# Patient Record
Sex: FEMALE | Race: WHITE | Employment: UNEMPLOYED | URBAN - METROPOLITAN AREA
[De-identification: names, ages, dates, MRNs, and addresses within clinical notes are randomized per-mention and may not be internally consistent; named-entity substitution may affect disease eponyms.]

---

## 2022-04-03 ENCOUNTER — HOSPITAL ENCOUNTER (EMERGENCY)
Age: 57
Discharge: HOME | End: 2022-04-03
Attending: EMERGENCY MEDICINE
Payer: COMMERCIAL

## 2022-04-03 ENCOUNTER — APPOINTMENT (OUTPATIENT)
Dept: GENERAL RADIOLOGY | Age: 57
End: 2022-04-03
Payer: COMMERCIAL

## 2022-04-03 VITALS
TEMPERATURE: 98.7 F | HEART RATE: 69 BPM | RESPIRATION RATE: 17 BRPM | OXYGEN SATURATION: 99 % | SYSTOLIC BLOOD PRESSURE: 178 MMHG | DIASTOLIC BLOOD PRESSURE: 108 MMHG

## 2022-04-03 DIAGNOSIS — M79.605 LEFT LEG PAIN: Primary | ICD-10-CM

## 2022-04-03 PROBLEM — I73.9 PERIPHERAL ARTERIAL DISEASE (HCC): Status: ACTIVE | Noted: 2022-04-03

## 2022-04-03 LAB
ABSOLUTE EOS #: 0.03 K/UL (ref 0–0.44)
ABSOLUTE IMMATURE GRANULOCYTE: 0.03 K/UL (ref 0–0.3)
ABSOLUTE LYMPH #: 0.91 K/UL (ref 1.1–3.7)
ABSOLUTE MONO #: 0.36 K/UL (ref 0.1–1.2)
ANION GAP SERPL CALCULATED.3IONS-SCNC: 11 MMOL/L (ref 9–17)
BASOPHILS # BLD: 1 % (ref 0–2)
BASOPHILS ABSOLUTE: 0.03 K/UL (ref 0–0.2)
BUN BLDV-MCNC: 8 MG/DL (ref 6–20)
CALCIUM SERPL-MCNC: 9.7 MG/DL (ref 8.6–10.4)
CHLORIDE BLD-SCNC: 96 MMOL/L (ref 98–107)
CO2: 26 MMOL/L (ref 20–31)
CREAT SERPL-MCNC: 0.82 MG/DL (ref 0.5–0.9)
EOSINOPHILS RELATIVE PERCENT: 1 % (ref 1–4)
GFR AFRICAN AMERICAN: >60 ML/MIN
GFR NON-AFRICAN AMERICAN: >60 ML/MIN
GFR SERPL CREATININE-BSD FRML MDRD: ABNORMAL ML/MIN/{1.73_M2}
GLUCOSE BLD-MCNC: 209 MG/DL (ref 70–99)
HCG QUALITATIVE: NEGATIVE
HCT VFR BLD CALC: 38.8 % (ref 36.3–47.1)
HEMOGLOBIN: 13.5 G/DL (ref 11.9–15.1)
IMMATURE GRANULOCYTES: 1 %
INR BLD: 1.3
LACTIC ACID, WHOLE BLOOD: 1.1 MMOL/L (ref 0.7–2.1)
LYMPHOCYTES # BLD: 14 % (ref 24–43)
MCH RBC QN AUTO: 32.5 PG (ref 25.2–33.5)
MCHC RBC AUTO-ENTMCNC: 34.8 G/DL (ref 28.4–34.8)
MCV RBC AUTO: 93.3 FL (ref 82.6–102.9)
MONOCYTES # BLD: 6 % (ref 3–12)
MYOGLOBIN: 22 NG/ML (ref 25–58)
NRBC AUTOMATED: 0 PER 100 WBC
PARTIAL THROMBOPLASTIN TIME: 46.7 SEC (ref 20.5–30.5)
PDW BLD-RTO: 13 % (ref 11.8–14.4)
PLATELET # BLD: 201 K/UL (ref 138–453)
PMV BLD AUTO: 10.2 FL (ref 8.1–13.5)
POTASSIUM SERPL-SCNC: 3.7 MMOL/L (ref 3.7–5.3)
PROTHROMBIN TIME: 13.8 SEC (ref 9.1–12.3)
RBC # BLD: 4.16 M/UL (ref 3.95–5.11)
SEG NEUTROPHILS: 77 % (ref 36–65)
SEGMENTED NEUTROPHILS ABSOLUTE COUNT: 5.2 K/UL (ref 1.5–8.1)
SODIUM BLD-SCNC: 133 MMOL/L (ref 135–144)
TOTAL CK: 29 U/L (ref 26–192)
WBC # BLD: 6.6 K/UL (ref 3.5–11.3)

## 2022-04-03 PROCEDURE — 85610 PROTHROMBIN TIME: CPT

## 2022-04-03 PROCEDURE — 82550 ASSAY OF CK (CPK): CPT

## 2022-04-03 PROCEDURE — 6360000002 HC RX W HCPCS

## 2022-04-03 PROCEDURE — 83605 ASSAY OF LACTIC ACID: CPT

## 2022-04-03 PROCEDURE — 83874 ASSAY OF MYOGLOBIN: CPT

## 2022-04-03 PROCEDURE — 93970 EXTREMITY STUDY: CPT

## 2022-04-03 PROCEDURE — 84703 CHORIONIC GONADOTROPIN ASSAY: CPT

## 2022-04-03 PROCEDURE — 85730 THROMBOPLASTIN TIME PARTIAL: CPT

## 2022-04-03 PROCEDURE — 93925 LOWER EXTREMITY STUDY: CPT

## 2022-04-03 PROCEDURE — 80048 BASIC METABOLIC PNL TOTAL CA: CPT

## 2022-04-03 PROCEDURE — 96374 THER/PROPH/DIAG INJ IV PUSH: CPT

## 2022-04-03 PROCEDURE — 73630 X-RAY EXAM OF FOOT: CPT

## 2022-04-03 PROCEDURE — 85025 COMPLETE CBC W/AUTO DIFF WBC: CPT

## 2022-04-03 PROCEDURE — 99285 EMERGENCY DEPT VISIT HI MDM: CPT

## 2022-04-03 PROCEDURE — 6370000000 HC RX 637 (ALT 250 FOR IP)

## 2022-04-03 RX ORDER — ALBUTEROL SULFATE 0.63 MG/3ML
1 SOLUTION RESPIRATORY (INHALATION) EVERY 4 HOURS PRN
COMMUNITY

## 2022-04-03 RX ORDER — IBUPROFEN 400 MG/1
400 TABLET ORAL EVERY 6 HOURS PRN
Qty: 120 TABLET | Refills: 0 | Status: ON HOLD | OUTPATIENT
Start: 2022-04-03 | End: 2022-04-17 | Stop reason: HOSPADM

## 2022-04-03 RX ORDER — AMLODIPINE BESYLATE 10 MG/1
10 TABLET ORAL DAILY
Status: ON HOLD | COMMUNITY
End: 2022-04-17 | Stop reason: HOSPADM

## 2022-04-03 RX ORDER — FENOFIBRATE 145 MG/1
145 TABLET, COATED ORAL DAILY
COMMUNITY

## 2022-04-03 RX ORDER — ACETAMINOPHEN 160 MG
2000 TABLET,DISINTEGRATING ORAL DAILY
COMMUNITY

## 2022-04-03 RX ORDER — KETOROLAC TROMETHAMINE 30 MG/ML
30 INJECTION, SOLUTION INTRAMUSCULAR; INTRAVENOUS ONCE
Status: COMPLETED | OUTPATIENT
Start: 2022-04-03 | End: 2022-04-03

## 2022-04-03 RX ORDER — BUPROPION HYDROCHLORIDE 150 MG/1
150 TABLET ORAL EVERY MORNING
COMMUNITY

## 2022-04-03 RX ORDER — OXYCODONE HYDROCHLORIDE 5 MG/1
5 TABLET ORAL ONCE
Status: COMPLETED | OUTPATIENT
Start: 2022-04-03 | End: 2022-04-03

## 2022-04-03 RX ORDER — ACETAMINOPHEN 325 MG/1
325 TABLET ORAL EVERY 6 HOURS PRN
Qty: 28 TABLET | Refills: 0 | Status: SHIPPED | OUTPATIENT
Start: 2022-04-03 | End: 2022-04-10

## 2022-04-03 RX ADMIN — OXYCODONE 5 MG: 5 TABLET ORAL at 20:22

## 2022-04-03 RX ADMIN — KETOROLAC TROMETHAMINE 30 MG: 30 INJECTION, SOLUTION INTRAMUSCULAR at 17:51

## 2022-04-03 ASSESSMENT — PAIN SCALES - GENERAL
PAINLEVEL_OUTOF10: 7
PAINLEVEL_OUTOF10: 7

## 2022-04-03 ASSESSMENT — ENCOUNTER SYMPTOMS
RHINORRHEA: 0
SHORTNESS OF BREATH: 0
BACK PAIN: 0
PHOTOPHOBIA: 0
NAUSEA: 0
WHEEZING: 0
VOMITING: 0
ABDOMINAL PAIN: 0

## 2022-04-03 NOTE — CONSULTS
[address]      Patient's Name/ Date of Birth/ Gender: Otilio Razo / 1965 (49 y.o.) / female     Referring Physician: Gabo Del Rio MD    Consulting Physician: Dr. Maame Foote    History of present Illness: Pt is a 49 y.o. female who is s/p femoral artery angioplasty with stent placement at [facility] towards the beginning of March. A few days ago she went back to [facility] with leg pain and the stent was found to be occluded. Vascular surgery there administered tPA to clear the stent, and she was discharged on Xarelto yesterday. Today she began experiencing left leg pain on the lateral side of her knee, and the pain has migrated up towards mid-thigh. The pain is constant, stabbing in nature but she denies numbness or tingling. She also has mild pain in her foot and her left big toe has some areas of necrosis that has been there for over a month. Pt has taken her Xarelto since discharge from the hospital.     Pt has type 1 diabetes with A1c of 6. She has HTN as well that is controlled with medications. The angioplasty last month was her first vascular procedure, and she had her appendix out as a child and a  as an adult. ED ordered duplex venous and arterial ultrasounds of bilateral legs. Images are being sent over from [facility].     Past Medical History:  has no past medical history on file. Past Surgical History: History reviewed. No pertinent surgical history. Social History:  reports that she has quit smoking. She does not have any smokeless tobacco history on file. She reports current alcohol use. She reports that she does not use drugs. Family History: family history is not on file. Review of Systems:   General: Denies fever, chills, night sweats, weight loss, malaise, fatigue  HEENT: Denies sore throat, sinus problems, allergic rhinosinusitis  Card: Denies chest pain, palpitations, orthopnea/PND.   Pulm: Denies cough, shortness of breath  GI: denies history of constipation, diarrhea, hematochezia or melena  : Denies polyuria, dysuria, hematuria  Endo: History of type 1 diabetes  Heme: Femoral stent clotting a few days ago  Neuro: Denies h/o CVA, TIA  Skin: Denies rashes, ulcers  Musculoskeletal: Left lateral leg pain, chronic left toe pain with black spots    Allergies: Patient has no allergy information on record. Current Meds:No current facility-administered medications for this encounter. No current outpatient medications on file. Vital Signs:  Vitals:    04/03/22 1925   BP:    Pulse: 69   Resp: 17   Temp:    SpO2: 99%       Physical Exam:  Vital signs and Nurse's note reviewed  Gen:  A&Ox3, NAD  HEENT: PERRLA, EOMI, no scleral icterus, oral mucosa moist  Neck: Supple  CVS: Regular rate and rhythm  Resp: normal effort with symmetric rise and fall of chest wall  Abd: soft, non-tender, non-distended, bowel sounds present. Ext: Palpable Left DP, Dopplerable signals on left PT and popliteal, palpable left femoral pulse. No color or temp difference between bilateral feet. Diabetes sensor on left thigh. CNS: Moves all extremities, no gross focal motor deficits  Skin: Left great toe with two small areas of necrosis and surrounding erythema     Labs:   Lab Results   Component Value Date    WBC 6.6 04/03/2022    HGB 13.5 04/03/2022    HCT 38.8 04/03/2022    MCV 93.3 04/03/2022     04/03/2022     Lab Results   Component Value Date     04/03/2022    K 3.7 04/03/2022    CL 96 04/03/2022    CO2 26 04/03/2022    BUN 8 04/03/2022    CREATININE 0.82 04/03/2022    GLUCOSE 209 04/03/2022    CALCIUM 9.7 04/03/2022     Lab Results   Component Value Date    INR 1.3 04/03/2022       Imaging:  XR FOOT LEFT (MIN 3 VIEWS)   Final Result   No fracture or dislocation.          VL DUP LOWER EXTREMITY VENOUS BILATERAL    (Results Pending)   VL DUP LOWER EXTREMITY ARTERIES BILATERAL    (Results Pending) Impression:  Patient Active Problem List   Diagnosis    Peripheral arterial disease ([address]     Recommendation:  · Pt has palpable DP pulse and dopplerable PT signal in the left leg without evidence of DVT and present flow in the femoral artery stent on duplex ultrasound. Based on this and her physical exam, there is no acute vascular surgery warranted at this time. · Recommend she continue Xarelto daily and follow-up with Dr. Ekta Mays in his clinic this coming week for possible angiogram.  · Recommend she follow-up with podiatry for chronic toe necrosis. · Plan discussed with Dr. Ekta Mays who is in agreement.     Jeremiah Alvarez,  PGY-1  4/3/22 8:02 PM

## 2022-04-03 NOTE — ED NOTES
Pt to ED via self with c/o left leg pain. Pt states recent arterial stent occlusion and procedure done at [facility]. Pt was discharged on Xarelto. Pt has noted chronic wound on left great toe. Toe appears red with black area. Pt states hx of DM type 1. Denies injury/trauma.  Pt is a/o, ambulatory, RR even and non labored      Shruthi Camacho RN  04/03/22 5677

## 2022-04-03 NOTE — ED PROVIDER NOTES
[address] [facility]  ED  Emergency Department Encounter  EmergencyMedicine Resident     Pt Devona Sober Judd Jeans  MRN: [id number]  Mjgfpapo 1965  Date of evaluation: 4/3/22  PCP:  No primary care provider on file. This patient was evaluated in the Emergency Department for symptoms described in the history of present illness. The patient was evaluated in the context of the global COVID-19 pandemic, which necessitated consideration that the patient might be at risk for infection with the SARS-CoV-2 virus that causes COVID-19. Institutional protocols and algorithms that pertain to the evaluation of patients at risk for COVID-19 are in a state of rapid change based on information released by regulatory bodies including the CDC and federal and state organizations. These policies and algorithms were followed during the patient's care in the ED. CHIEF COMPLAINT       Chief Complaint   Patient presents with    Leg Pain       HISTORY OF PRESENT ILLNESS  (Location/Symptom, Timing/Onset, Context/Setting, Quality, Duration, Modifying Factors, Severity.)      Desmond Verma is a 49 y.o. female who presents with complaints of left posterior knee pain that extends to the posterior and lateral left thigh. Patient recently had left femoral artery stent placed 3/10/2022 and had increasing pain and had procedure due to blocked stent 3/31/2022 at [facility] in [place] by Dev Fairchild where a catheter was placed and patient had TPA instilled overnight. Patient was discharged from [facility] yesterday on Xarelto. Denies any chest pain, shortness of breath, fever, chills, abdominal pain. No history of previous blood clots in the past, cancers, IV drug use. Patient notes history of smoking for years but mostly stopped a few years ago and last smoked prior to coming to ED today. Patient notes history of type 1 diabetes.   Patient also notes she had left great toe ingrown toenail removal 2 weeks ago with initial improvement in left great toe redness and pain but notes it is now returned over the last week and a half. On review of records from [place], patient was noted to present to [facility] prior to admission on 3/30/2022 with complaints of left great toe pain, erythema and on exam toe was noted to be bluish and CTA showing blocked stent. At that [facility] procedure where catheter was placed from right groin to left femoral artery stent and thrombolysis was passed through catheter with resolution of blockage. PAST MEDICAL / SURGICAL / SOCIAL / FAMILY HISTORY      has no past medical history on file. Hypertension, hyperlipidemia, type 1 diabetes     has no past surgical history on file. Social History     Socioeconomic History    Marital status:      Spouse name: Not on file    Number of children: Not on file    Years of education: Not on file    Highest education level: Not on file   Occupational History    Not on file   Tobacco Use    Smoking status: Former Smoker    Smokeless tobacco: Not on file   Substance and Sexual Activity    Alcohol use: Yes     Comment: social    Drug use: Never    Sexual activity: Not on file   Other Topics Concern    Not on file   Social History Narrative    Not on file     Social Determinants of Health     Financial Resource Strain:     Difficulty of Paying Living Expenses: Not on file   Food Insecurity:     Worried About Running Out of Food in the Last Year: Not on file    Ella of Food in the Last Year: Not on file   Transportation Needs:     Lack of Transportation (Medical): Not on file    Lack of Transportation (Non-Medical):  Not on file   Physical Activity:     Days of Exercise per Week: Not on file    Minutes of Exercise per Session: Not on file   Stress:     Feeling of Stress : Not on file   Social Connections:     Frequency of Communication with Friends and Family: Not on file    Frequency of Social Gatherings with Friends and Family: Not on file    Attends Sikhism Services: Not on file    Active Member of Clubs or Organizations: Not on file    Attends Club or Organization Meetings: Not on file    Marital Status: Not on file   Intimate Partner Violence:     Fear of Current or Ex-Partner: Not on file    Emotionally Abused: Not on file    Physically Abused: Not on file    Sexually Abused: Not on file   Housing Stability:     Unable to Pay for Housing in the Last Year: Not on file    Number of [place] in the Last Year: Not on file    Unstable Housing in the Last Year: Not on file       History reviewed. No pertinent family history. Allergies:  Patient has no allergy information on record. Home Medications:  Prior to Admission medications    Medication Sig Start Date End Date Taking?  Authorizing Provider   albuterol (ACCUNEB) 0.63 MG/3ML nebulizer solution Take 1 ampule by nebulization every 4 hours as needed for Wheezing   Yes Historical Provider, MD   amLODIPine (NORVASC) 10 MG tablet Take 10 mg by mouth daily   Yes Historical Provider, MD   metoprolol tartrate (LOPRESSOR) 25 MG tablet Take 12.5 mg by mouth 2 times daily   Yes Historical Provider, MD   Cholecalciferol (VITAMIN D3) 50 MCG (2000 UT) CAPS Take 2,000 Units by mouth daily   Yes Historical Provider, MD   Insulin Pump Accessories MISC by Does not apply route   Yes Historical Provider, MD   buPROPion (WELLBUTRIN XL) 150 MG extended release tablet Take 150 mg by mouth every morning   Yes Historical Provider, MD   fenofibrate (TRICOR) 145 MG tablet Take 145 mg by mouth daily   Yes Historical Provider, MD   acetaminophen (TYLENOL) 325 MG tablet Take 1 tablet by mouth every 6 hours as needed for Pain 4/3/22 4/10/22 Yes Conner Dangelo MD   ibuprofen (IBU) 400 MG tablet Take 1 tablet by mouth every 6 hours as needed for Pain 4/3/22  Yes Conner Dangelo MD       REVIEW OF SYSTEMS    (2-9 systems for level 4, 10 or more for level 5)      Review of Systems   Constitutional: Negative for chills and fever. HENT: Negative for congestion and rhinorrhea. Eyes: Negative for photophobia and visual disturbance. Respiratory: Negative for shortness of breath and wheezing. Cardiovascular: Negative for chest pain and palpitations. Gastrointestinal: Negative for abdominal pain, nausea and vomiting. Genitourinary: Negative for dysuria and frequency. Musculoskeletal: Negative for back pain and neck pain. Left lower extremity pain   Skin: Positive for wound. Negative for rash. Neurological: Negative for numbness and headaches. PHYSICAL EXAM   (up to 7 for level 4, 8 or more for level 5)      INITIAL VITALS:   BP (!) 178/108   Pulse 69   Temp 98.7 °F (37.1 °C) (Oral)   Resp 17   SpO2 99%     Physical Exam  Vitals and nursing note reviewed. Constitutional:       General: She is not in acute distress. HENT:      Head: Normocephalic and atraumatic. Right Ear: External ear normal.      Left Ear: External ear normal.      Nose: Nose normal.      Mouth/Throat:      Mouth: Mucous membranes are moist.      Pharynx: Oropharynx is clear. Eyes:      Conjunctiva/sclera: Conjunctivae normal.   Cardiovascular:      Rate and Rhythm: Normal rate and regular rhythm. Pulses:           Dorsalis pedis pulses are 1+ on the right side and 1+ on the left side. Pulmonary:      Effort: No respiratory distress. Breath sounds: No wheezing. Abdominal:      Palpations: Abdomen is soft. Tenderness: There is no abdominal tenderness. There is no guarding or rebound. Musculoskeletal:         General: Normal range of motion. Cervical back: Normal range of motion. Comments: No unilateral swelling or calf tenderness   Skin:     General: Skin is warm and dry. Capillary Refill: Capillary refill takes less than 2 seconds.       Comments: Chronic left great toe wound, erythematous, tender to palpation, no drainage   Neurological:      General: No focal deficit present. Mental Status: She is alert and oriented to person, place, and time.          DIFFERENTIAL  DIAGNOSIS     PLAN (LABS / IMAGING / EKG):  Orders Placed This Encounter   Procedures    VL DUP LOWER EXTREMITY VENOUS BILATERAL    VL DUP LOWER EXTREMITY ARTERIES BILATERAL    XR FOOT LEFT (MIN 3 VIEWS)    CBC with Auto Differential    Basic Metabolic Panel w/ Reflex to MG    Protime-INR    APTT    HCG Qualitative, Serum    Lactic Acid    CK    Myoglobin, Serum    Inpatient consult to Vascular Surgery       MEDICATIONS ORDERED:  Orders Placed This Encounter   Medications    ketorolac (TORADOL) injection 30 mg    oxyCODONE (ROXICODONE) immediate release tablet 5 mg    acetaminophen (TYLENOL) 325 MG tablet     Sig: Take 1 tablet by mouth every 6 hours as needed for Pain     Dispense:  28 tablet     Refill:  0    ibuprofen (IBU) 400 MG tablet     Sig: Take 1 tablet by mouth every 6 hours as needed for Pain     Dispense:  120 tablet     Refill:  0       DDX: DVT, postop pain, femoral artery stent malfunction, cellulitis    DIAGNOSTIC RESULTS / EMERGENCY DEPARTMENT COURSE / MDM   LAB RESULTS:  Results for orders placed or performed during the hospital encounter of 04/03/22   CBC with Auto Differential   Result Value Ref Range    WBC 6.6 3.5 - 11.3 k/uL    RBC 4.16 3.95 - 5.11 m/uL    Hemoglobin 13.5 11.9 - 15.1 g/dL    Hematocrit 38.8 36.3 - 47.1 %    MCV 93.3 82.6 - 102.9 fL    MCH 32.5 25.2 - 33.5 pg    MCHC 34.8 28.4 - 34.8 g/dL    RDW 13.0 11.8 - 14.4 %    Platelets 939 289 - 742 k/uL    MPV 10.2 8.1 - 13.5 fL    NRBC Automated 0.0 0.0 per 100 WBC    Seg Neutrophils 77 (H) 36 - 65 %    Lymphocytes 14 (L) 24 - 43 %    Monocytes 6 3 - 12 %    Eosinophils % 1 1 - 4 %    Basophils 1 0 - 2 %    Immature Granulocytes 1 (H) 0 %    Segs Absolute 5.20 1.50 - 8.10 k/uL    Absolute Lymph # 0.91 (L) 1.10 - 3.70 k/uL    Absolute Mono # 0.36 0.10 - 1.20 k/uL    Absolute Eos # 0.03 0.00 - 0.44 k/uL    Basophils Absolute 0.03 0.00 - 0.20 k/uL    Absolute Immature Granulocyte 0.03 0.00 - 0.30 k/uL   Basic Metabolic Panel w/ Reflex to MG   Result Value Ref Range    Glucose 209 (H) 70 - 99 mg/dL    BUN 8 6 - 20 mg/dL    CREATININE 0.82 0.50 - 0.90 mg/dL    Calcium 9.7 8.6 - 10.4 mg/dL    Sodium 133 (L) 135 - 144 mmol/L    Potassium 3.7 3.7 - 5.3 mmol/L    Chloride 96 (L) 98 - 107 mmol/L    CO2 26 20 - 31 mmol/L    Anion Gap 11 9 - 17 mmol/L    GFR Non-African American >60 >60 mL/min    GFR African American >60 >60 mL/min    GFR Comment         Protime-INR   Result Value Ref Range    Protime 13.8 (H) 9.1 - 12.3 sec    INR 1.3    APTT   Result Value Ref Range    PTT 46.7 (H) 20.5 - 30.5 sec   HCG Qualitative, Serum   Result Value Ref Range    hCG Qual NEGATIVE NEGATIVE   Lactic Acid   Result Value Ref Range    Lactic Acid, Whole Blood 1.1 0.7 - 2.1 mmol/L   CK   Result Value Ref Range    Total CK 29 26 - 192 U/L   Myoglobin, Serum   Result Value Ref Range    Myoglobin 22 (L) 25 - 58 ng/mL       IMPRESSION: Left leg pain    RADIOLOGY:  VL DUP LOWER EXTREMITY ARTERIES BILATERAL   Final Result      VL DUP LOWER EXTREMITY VENOUS BILATERAL   Final Result      XR FOOT LEFT (MIN 3 VIEWS)   Final Result   No fracture or dislocation. EMERGENCY DEPARTMENT COURSE:  57-year-old female, history of type 1 diabetes, peripheral vascular disease, hypertension, high cholesterol, presented to ED with complaints of right posterior knee and lateral thigh tenderness over the past day status post being discharged from [facility] in [place]. Patient noted to have left femoral artery stent placed 3/10/2022 for ischemic peripheral vascular disease and presented to [facility] 3/30/2022 for left first toe pain described as blue and noted to have blocked stent requiring catheter placement and TPA.   Patient was just discharged yesterday on Xarelto and stent was noted to be clear after procedure. Patient also noted yesterday an episode of foot swelling extending to mid calf of left leg associated with erythema that is now improved. No chest pain or shortness of breath. Denied any previous history of DVTs but notes history of smoking for years. Did note she had a ingrown toenail of left great toe removed a couple weeks ago with initial resolution of skin changes but notes that her back after the last week and a half. On exam, afebrile, nontachycardic, lungs clear, bilateral DP pulses palpable and patient able to wiggle toes on both feet. Cap refill less than 2 seconds, chronic left great toe wound, erythematous, tender, mildly swollen without drainage, no swelling of left leg greater than right, no calf tenderness. Plan to get bilateral lower extremity ultrasounds of arteries and veins, consult vascular, Toradol for pain, basic labs. Patient without fever, vomiting and white blood cell count within normal limits and skin changes have been present for weeks, so cellulitis less likely. Ultrasounds of lower extremity veins and arteries nonacute, stent patent. Vascular consulted who recommended additional labs including lactate, CK, myoglobin that were nonacute and discharge with follow-up outpatient, ice packs for pain as well as Tylenol and Motrin. Patient agreeable to plan and discharged home with prescription for Tylenol and Motrin with return precautions including any chest pain, shortness of breath, numbness, weakness, tingling, fever, vomiting.     ED Course as of 04/04/22 0141   Sun Apr 03, 2022   1734 Care everywhere, patient had femoral artery stent placement 3/10/2022 with blockage noted 3/30/2022 and now complaining of pain behind left knee extending proximally into posterior left thigh [AR]   1746 PTT(!): 46.7 [AR]   1820 Prothrombin Time(!): 13.8 [AR]   1820 GLUCOSE, FASTING,GF(!): 209 [AR]   1820 Sodium(!): 133 [AR]   1820 Chloride(!): 96 [AR]   1820 WBC: 6.6 [AR]   1837 Per notes from [facility] patient had stent placed on 3/10/2022 in left femoral artery for occlusive peripheral artery disease and then presented to [facility] ED on 3/30/2022 with complaints of pain in left first digit of left foot and had procedure on 3/31/2022 by vascular where CTA was performed and underwent placement of EKOS catheter and had overnight thrombolysis. Patient then had repeat angiogram that demonstrated patency of previously placed stent. [AR]   1839 Vascular bedside [AR]   1949 Verbal read back from ultrasound tech showing no evidence of DVT in either extremity or issue with flow through the stent. Vascular resident notified. Awaiting recs [AR]   2006 Updated patient on plan to get additional lab work ordered by vascular. Patient notes having pain after Toradol so plan to give 5 of Michaela. Patient provided sandwich as well. [AR]   2026 Lactic Acid, Whole Blood: 1.1  Updated on results. Vascular noted patient okay to be discharged with Tylenol and Motrin and recommended outpatient follow-up. Answered all patient pertinent questions and provided return precautions including any chest pain, shortness of breath, return of swelling and either extremity, nausea, vomiting, fevers, increasing pain. Answered all patient pertinent questions and agreeable to plan of care. [AR]   2119 Total CK: 29 [AR]   2119 Myoglobin(!): 22 [AR]      ED Course User Index  [AR] Greg Monteiro MD       PROCEDURES:  None    CONSULTS:  IP CONSULT TO VASCULAR SURGERY    CRITICAL CARE:  None    FINAL IMPRESSION      1.  Left leg pain          DISPOSITION / PLAN     DISPOSITION Decision To Discharge 04/03/2022 09:00:10 PM      PATIENT REFERRED TO:  Arlyn Hernandez MD  [address] [address] [facility] [address] [place] [address]  [phone]    Call in 1 week        DISCHARGE MEDICATIONS:  Discharge Medication List as of 4/3/2022  9:07 PM      START taking these medications    Details   acetaminophen (TYLENOL) 325 MG tablet Take 1 tablet by mouth every 6 hours as needed for Pain, Disp-28 tablet, R-0Print      ibuprofen (IBU) 400 MG tablet Take 1 tablet by mouth every 6 hours as needed for Pain, Disp-120 tablet, R-0Print             Chyna Austin MD  Emergency Medicine Resident    (Please note that portions of thisnote were completed with a voice recognition program.  Efforts were made to edit the dictations but occasionally words are mis-transcribed.)      Em Bowie MD  Resident  04/04/22 5035

## 2022-04-03 NOTE — ED PROVIDER NOTES
[address] [facility]     Emergency Department     Faculty Attestation    I performed a history and physical examination of the patient and discussed management with the resident. I reviewed the residents note and agree with the documented findings and plan of care. Any areas of disagreement are noted on the chart. I was personally present for the key portions of any procedures. I have documented in the chart those procedures where I was not present during the key portions. I have reviewed the emergency nurses triage note. I agree with the chief complaint, past medical history, past surgical history, allergies, medications, social and family history as documented unless otherwise noted below. For Physician Assistant/ Nurse Practitioner cases/documentation I have personally evaluated this patient and have completed at least one if not all key elements of the E/M (history, physical exam, and MDM). Additional findings are as noted. This patient was evaluated in the Emergency Department for symptoms described in the history of present illness. He/she was evaluated in the context of the global COVID-19 pandemic, which necessitated consideration that the patient might be at risk for infection with the SARS-CoV-2 virus that causes COVID-19. Institutional protocols and algorithms that pertain to the evaluation of patients at risk for COVID-19 are in a state of rapid change based on information released by regulatory bodies including the CDC and federal and state organizations. These policies and algorithms were followed during the patient's care in the ED. Patient here with left leg pain. Recent procedures done at [facility] and [address], had a left femoral artery stent that occluded additional procedure several days ago was just discharged yesterday. Also states she had a blood clot in her leg. She is on Xarelto. Denies any chest pain shortness of breath no fevers.   On exam equal pulses distally poor but equal both feet pink warm. Chronic appearing wound on the left great toe. Will order vascular ultrasounds, labs, consult vascular, reevaluate      Results from [facility] through care everywhere. Venous US 3/30/22  FINDINGS:   Common femoral vein: Patent. Deep femoral vein: Patent   Femoral vein: Patent. Popliteal vein: Patent. Tibioperoneal veins: Patent. Greater saphenous vein: Patent. IMPRESSION: No left lower extremity deep venous thrombosis. Arterial US 3/30/22  IMPRESSION:   1. Distal left femoral artery stent is occluded. Prominent collateral vessels   supplying diminished flow to the distal lower extremity. Critical Care     none    Letty Sadler MD, Neymar Goodrich  Attending Emergency  Physician             Letty Sadler MD  04/03/22 1808    7:46 PM EDT  Verbal face-to-face report from vascular tech, no DVT, good arterial flow left lower extremity including through femoral artery stent.        Letty Sadler MD  04/03/22 1946

## 2022-04-04 NOTE — ED NOTES
The following labs labeled with pt sticker and tubed to lab:     [] Blue     [] Lavender   [] on ice  [] Green/yellow  [x] Green/black [] on ice  [] Yellow  [] Red  [] Pink      [] COVID-19 swab    [] Rapid  [] PCR  [] Flu swab  [] Peds Viral Panel     [] Urine Sample  [] Pelvic Cultures  [] Blood Cultures            Clarence Barlow RN  04/03/22 2028

## 2022-04-11 ENCOUNTER — APPOINTMENT (OUTPATIENT)
Dept: CT IMAGING | Age: 57
DRG: 181 | End: 2022-04-11
Attending: INTERNAL MEDICINE
Payer: COMMERCIAL

## 2022-04-11 ENCOUNTER — APPOINTMENT (OUTPATIENT)
Dept: GENERAL RADIOLOGY | Age: 57
DRG: 181 | End: 2022-04-11
Attending: INTERNAL MEDICINE
Payer: COMMERCIAL

## 2022-04-11 ENCOUNTER — HOSPITAL ENCOUNTER (INPATIENT)
Age: 57
LOS: 7 days | Discharge: HOME | DRG: 181 | End: 2022-04-18
Attending: INTERNAL MEDICINE | Admitting: INTERNAL MEDICINE
Payer: COMMERCIAL

## 2022-04-11 DIAGNOSIS — I73.9 PERIPHERAL ARTERIAL DISEASE (HCC): Primary | ICD-10-CM

## 2022-04-11 DIAGNOSIS — I96 GANGRENE (HCC): ICD-10-CM

## 2022-04-11 PROBLEM — I10 PRIMARY HYPERTENSION: Status: ACTIVE | Noted: 2022-04-11

## 2022-04-11 PROBLEM — E10.9 TYPE 1 DIABETES MELLITUS (HCC): Status: ACTIVE | Noted: 2022-04-11

## 2022-04-11 LAB
ABSOLUTE EOS #: 0.11 K/UL (ref 0–0.44)
ABSOLUTE IMMATURE GRANULOCYTE: <0.03 K/UL (ref 0–0.3)
ABSOLUTE LYMPH #: 2.4 K/UL (ref 1.1–3.7)
ABSOLUTE MONO #: 0.3 K/UL (ref 0.1–1.2)
ANION GAP SERPL CALCULATED.3IONS-SCNC: 13 MMOL/L (ref 9–17)
BASOPHILS # BLD: 1 % (ref 0–2)
BASOPHILS ABSOLUTE: 0.06 K/UL (ref 0–0.2)
BUN BLDV-MCNC: 6 MG/DL (ref 6–20)
CALCIUM SERPL-MCNC: 9.4 MG/DL (ref 8.6–10.4)
CHLORIDE BLD-SCNC: 101 MMOL/L (ref 98–107)
CO2: 24 MMOL/L (ref 20–31)
CREAT SERPL-MCNC: 0.77 MG/DL (ref 0.5–0.9)
EOSINOPHILS RELATIVE PERCENT: 2 % (ref 1–4)
GFR AFRICAN AMERICAN: >60 ML/MIN
GFR NON-AFRICAN AMERICAN: >60 ML/MIN
GFR SERPL CREATININE-BSD FRML MDRD: ABNORMAL ML/MIN/{1.73_M2}
GLUCOSE BLD-MCNC: 133 MG/DL (ref 70–99)
HCT VFR BLD CALC: 38.9 % (ref 36.3–47.1)
HEMOGLOBIN: 13.4 G/DL (ref 11.9–15.1)
IMMATURE GRANULOCYTES: 0 %
INR BLD: 1.2
LYMPHOCYTES # BLD: 36 % (ref 24–43)
MCH RBC QN AUTO: 32.6 PG (ref 25.2–33.5)
MCHC RBC AUTO-ENTMCNC: 34.4 G/DL (ref 28.4–34.8)
MCV RBC AUTO: 94.6 FL (ref 82.6–102.9)
MONOCYTES # BLD: 5 % (ref 3–12)
NRBC AUTOMATED: 0 PER 100 WBC
PARTIAL THROMBOPLASTIN TIME: 99.7 SEC (ref 20.5–30.5)
PDW BLD-RTO: 13.2 % (ref 11.8–14.4)
PLATELET # BLD: 399 K/UL (ref 138–453)
PMV BLD AUTO: 9.7 FL (ref 8.1–13.5)
POTASSIUM SERPL-SCNC: 3.9 MMOL/L (ref 3.7–5.3)
PROTHROMBIN TIME: 12.9 SEC (ref 9.1–12.3)
RBC # BLD: 4.11 M/UL (ref 3.95–5.11)
SEG NEUTROPHILS: 57 % (ref 36–65)
SEGMENTED NEUTROPHILS ABSOLUTE COUNT: 3.83 K/UL (ref 1.5–8.1)
SODIUM BLD-SCNC: 138 MMOL/L (ref 135–144)
WBC # BLD: 6.7 K/UL (ref 3.5–11.3)

## 2022-04-11 PROCEDURE — 36415 COLL VENOUS BLD VENIPUNCTURE: CPT

## 2022-04-11 PROCEDURE — 85730 THROMBOPLASTIN TIME PARTIAL: CPT

## 2022-04-11 PROCEDURE — 6360000002 HC RX W HCPCS: Performed by: INTERNAL MEDICINE

## 2022-04-11 PROCEDURE — 1200000000 HC SEMI PRIVATE

## 2022-04-11 PROCEDURE — 75635 CT ANGIO ABDOMINAL ARTERIES: CPT

## 2022-04-11 PROCEDURE — 85610 PROTHROMBIN TIME: CPT

## 2022-04-11 PROCEDURE — 6360000004 HC RX CONTRAST MEDICATION: Performed by: STUDENT IN AN ORGANIZED HEALTH CARE EDUCATION/TRAINING PROGRAM

## 2022-04-11 PROCEDURE — 2580000003 HC RX 258: Performed by: STUDENT IN AN ORGANIZED HEALTH CARE EDUCATION/TRAINING PROGRAM

## 2022-04-11 PROCEDURE — 6360000002 HC RX W HCPCS: Performed by: STUDENT IN AN ORGANIZED HEALTH CARE EDUCATION/TRAINING PROGRAM

## 2022-04-11 PROCEDURE — 85025 COMPLETE CBC W/AUTO DIFF WBC: CPT

## 2022-04-11 PROCEDURE — 99222 1ST HOSP IP/OBS MODERATE 55: CPT | Performed by: NURSE PRACTITIONER

## 2022-04-11 PROCEDURE — 2580000003 HC RX 258: Performed by: INTERNAL MEDICINE

## 2022-04-11 PROCEDURE — 80048 BASIC METABOLIC PNL TOTAL CA: CPT

## 2022-04-11 PROCEDURE — 6370000000 HC RX 637 (ALT 250 FOR IP): Performed by: INTERNAL MEDICINE

## 2022-04-11 PROCEDURE — 71045 X-RAY EXAM CHEST 1 VIEW: CPT

## 2022-04-11 RX ORDER — ALBUTEROL SULFATE 2.5 MG/3ML
2.5 SOLUTION RESPIRATORY (INHALATION) 4 TIMES DAILY PRN
Status: DISCONTINUED | OUTPATIENT
Start: 2022-04-11 | End: 2022-04-18 | Stop reason: HOSPADM

## 2022-04-11 RX ORDER — AMLODIPINE BESYLATE 10 MG/1
10 TABLET ORAL DAILY
Status: DISCONTINUED | OUTPATIENT
Start: 2022-04-11 | End: 2022-04-12

## 2022-04-11 RX ORDER — HEPARIN SODIUM 1000 [USP'U]/ML
80 INJECTION, SOLUTION INTRAVENOUS; SUBCUTANEOUS PRN
Status: DISCONTINUED | OUTPATIENT
Start: 2022-04-11 | End: 2022-04-14

## 2022-04-11 RX ORDER — SODIUM CHLORIDE 0.9 % (FLUSH) 0.9 %
5-40 SYRINGE (ML) INJECTION PRN
Status: DISCONTINUED | OUTPATIENT
Start: 2022-04-11 | End: 2022-04-18 | Stop reason: HOSPADM

## 2022-04-11 RX ORDER — HEPARIN SODIUM 1000 [USP'U]/ML
80 INJECTION, SOLUTION INTRAVENOUS; SUBCUTANEOUS ONCE
Status: COMPLETED | OUTPATIENT
Start: 2022-04-11 | End: 2022-04-11

## 2022-04-11 RX ORDER — BUPROPION HYDROCHLORIDE 150 MG/1
150 TABLET ORAL EVERY MORNING
Status: DISCONTINUED | OUTPATIENT
Start: 2022-04-12 | End: 2022-04-18 | Stop reason: HOSPADM

## 2022-04-11 RX ORDER — ACETAMINOPHEN 325 MG/1
650 TABLET ORAL EVERY 6 HOURS PRN
Status: DISCONTINUED | OUTPATIENT
Start: 2022-04-11 | End: 2022-04-18 | Stop reason: HOSPADM

## 2022-04-11 RX ORDER — SODIUM CHLORIDE 9 MG/ML
INJECTION, SOLUTION INTRAVENOUS PRN
Status: DISCONTINUED | OUTPATIENT
Start: 2022-04-11 | End: 2022-04-18 | Stop reason: HOSPADM

## 2022-04-11 RX ORDER — ONDANSETRON 4 MG/1
4 TABLET, ORALLY DISINTEGRATING ORAL EVERY 8 HOURS PRN
Status: DISCONTINUED | OUTPATIENT
Start: 2022-04-11 | End: 2022-04-18 | Stop reason: HOSPADM

## 2022-04-11 RX ORDER — MORPHINE SULFATE 4 MG/ML
4 INJECTION, SOLUTION INTRAMUSCULAR; INTRAVENOUS
Status: DISCONTINUED | OUTPATIENT
Start: 2022-04-11 | End: 2022-04-12

## 2022-04-11 RX ORDER — POLYETHYLENE GLYCOL 3350 17 G/17G
17 POWDER, FOR SOLUTION ORAL DAILY PRN
Status: DISCONTINUED | OUTPATIENT
Start: 2022-04-11 | End: 2022-04-18 | Stop reason: HOSPADM

## 2022-04-11 RX ORDER — HEPARIN SODIUM 1000 [USP'U]/ML
40 INJECTION, SOLUTION INTRAVENOUS; SUBCUTANEOUS PRN
Status: DISCONTINUED | OUTPATIENT
Start: 2022-04-11 | End: 2022-04-14

## 2022-04-11 RX ORDER — SODIUM CHLORIDE 0.9 % (FLUSH) 0.9 %
5-40 SYRINGE (ML) INJECTION EVERY 12 HOURS SCHEDULED
Status: DISCONTINUED | OUTPATIENT
Start: 2022-04-11 | End: 2022-04-18 | Stop reason: HOSPADM

## 2022-04-11 RX ORDER — ONDANSETRON 2 MG/ML
4 INJECTION INTRAMUSCULAR; INTRAVENOUS EVERY 6 HOURS PRN
Status: DISCONTINUED | OUTPATIENT
Start: 2022-04-11 | End: 2022-04-18 | Stop reason: HOSPADM

## 2022-04-11 RX ORDER — ACETAMINOPHEN 650 MG/1
650 SUPPOSITORY RECTAL EVERY 6 HOURS PRN
Status: DISCONTINUED | OUTPATIENT
Start: 2022-04-11 | End: 2022-04-18 | Stop reason: HOSPADM

## 2022-04-11 RX ADMIN — ONDANSETRON 4 MG: 2 INJECTION INTRAMUSCULAR; INTRAVENOUS at 23:43

## 2022-04-11 RX ADMIN — HEPARIN SODIUM 3920 UNITS: 1000 INJECTION INTRAVENOUS; SUBCUTANEOUS at 17:25

## 2022-04-11 RX ADMIN — MORPHINE SULFATE 4 MG: 4 INJECTION INTRAVENOUS at 23:43

## 2022-04-11 RX ADMIN — HEPARIN SODIUM 18 UNITS/KG/HR: 5000 INJECTION, SOLUTION INTRAVENOUS; SUBCUTANEOUS at 17:36

## 2022-04-11 RX ADMIN — METOPROLOL TARTRATE 12.5 MG: 25 TABLET ORAL at 20:53

## 2022-04-11 RX ADMIN — MORPHINE SULFATE 4 MG: 4 INJECTION INTRAVENOUS at 17:25

## 2022-04-11 RX ADMIN — IOPAMIDOL 125 ML: 755 INJECTION, SOLUTION INTRAVENOUS at 22:48

## 2022-04-11 RX ADMIN — MORPHINE SULFATE 4 MG: 4 INJECTION INTRAVENOUS at 20:37

## 2022-04-11 RX ADMIN — ONDANSETRON 4 MG: 2 INJECTION INTRAMUSCULAR; INTRAVENOUS at 17:24

## 2022-04-11 RX ADMIN — SODIUM CHLORIDE, PRESERVATIVE FREE 10 ML: 5 INJECTION INTRAVENOUS at 20:40

## 2022-04-11 ASSESSMENT — ENCOUNTER SYMPTOMS
EYE REDNESS: 0
SINUS PAIN: 0
SINUS PRESSURE: 0
COUGH: 0
CONSTIPATION: 0
EYE PAIN: 0
ABDOMINAL PAIN: 0
DIARRHEA: 0
BACK PAIN: 0
CHOKING: 0

## 2022-04-11 ASSESSMENT — PAIN SCALES - GENERAL
PAINLEVEL_OUTOF10: 10
PAINLEVEL_OUTOF10: 9
PAINLEVEL_OUTOF10: 10
PAINLEVEL_OUTOF10: 4

## 2022-04-11 NOTE — H&P
re-admitted. She continued to have pain after discharge and had her sister drive her to [facility] ED where she had a vascular consult and established care with vascular MD. She went to office appointment today where she states they were unable to find pulses to her left foot. She was transferred for vascular intervention. She states that she has been unable to walk on the foot and pain has been unbearable requiring narcotic pain relief. In addition she has DM1 and HTN. She states she is compliant with home medications and has an insulin pump for DM management. Past Medical History:     Past Medical History:   Diagnosis Date    HTN (hypertension)     Type 1 diabetes mellitus ([facility] [address]         Past Surgical History:     Past Surgical History:   Procedure Laterality Date    APPENDECTOMY       SECTION      COCCYX REMOVAL          Medications Prior to Admission:     Prior to Admission medications    Medication Sig Start Date End Date Taking?  Authorizing Provider   albuterol (ACCUNEB) 0.63 MG/3ML nebulizer solution Take 1 ampule by nebulization every 4 hours as needed for Wheezing    Historical Provider, MD   amLODIPine (NORVASC) 10 MG tablet Take 10 mg by mouth daily    Historical Provider, MD   metoprolol tartrate (LOPRESSOR) 25 MG tablet Take 12.5 mg by mouth 2 times daily    Historical Provider, MD   Cholecalciferol (VITAMIN D3) 50 MCG (2000) CAPS Take 2,000 Units by mouth daily    Historical Provider, MD   Insulin Pump Accessories MISC by Does not apply route    Historical Provider, MD   buPROPion (WELLBUTRIN XL) 150 MG extended release tablet Take 150 mg by mouth every morning    Historical Provider, MD   fenofibrate (TRICOR) 145 MG tablet Take 145 mg by mouth daily    Historical Provider, MD   acetaminophen (TYLENOL) 325 MG tablet Take 1 tablet by mouth every 6 hours as needed for Pain 4/3/22 4/10/22  Tyrel Jauregui MD   ibuprofen (IBU) 400 MG tablet Take 1 tablet by mouth every 6 hours as needed for Pain 4/3/22   Alecia Simpson MD        Allergies:     Patient has no allergy information on record. Social History:     Tobacco:    reports that she quit smoking about 3 months ago. She quit after 25.00 years of use. She has never used smokeless tobacco.  Alcohol:      reports current alcohol use. Drug Use:  reports no history of drug use. Family History:     Family History   Problem Relation Age of Onset    Other Mother     Other Father     Diabetes Sister        Review of Systems:     Positive and Negative as described in HPI. Review of Systems   Constitutional: Negative for chills and fatigue. HENT: Negative for sinus pressure and sinus pain. Eyes: Negative for pain and redness. Respiratory: Negative for cough and choking. Cardiovascular: Negative for chest pain, palpitations and leg swelling. Gastrointestinal: Negative for abdominal pain, constipation and diarrhea. Endocrine: Negative for polydipsia, polyphagia and polyuria. Genitourinary: Negative for dysuria, hematuria and urgency. Musculoskeletal: Positive for gait problem. Negative for arthralgias and back pain. Skin: Positive for wound (Left great toe). Allergic/Immunologic: Negative for environmental allergies, food allergies and immunocompromised state. Neurological: Negative for seizures, weakness and headaches. Hematological: Negative for adenopathy. Does not bruise/bleed easily. Psychiatric/Behavioral: Negative for agitation. The patient is not nervous/anxious. Physical Exam:   BP (!) 135/93   Pulse 76   Temp 98.1 °F (36.7 °C) (Oral)   Resp 16   Wt 108 lb 0.4 oz (49 kg)   SpO2 99%   Temp (24hrs), Av.1 °F (36.7 °C), Min:98.1 °F (36.7 °C), Max:98.1 °F (36.7 °C)    No results for input(s): POCGLU in the last 72 hours. No intake or output data in the 24 hours ending 22 1611    Physical Exam  Constitutional:       General: She is not in acute distress. Appearance: Normal appearance.  She is not ill-appearing. HENT:      Nose: Nose normal. No congestion. Mouth/Throat:      Mouth: Mucous membranes are moist.      Pharynx: Oropharynx is clear. Eyes:      Pupils: Pupils are equal, round, and reactive to light. Cardiovascular:      Rate and Rhythm: Normal rate and regular rhythm. Pulses:           Dorsalis pedis pulses are detected w/ Doppler on the left side. Posterior tibial pulses are detected w/ Doppler on the left side. Heart sounds: Normal heart sounds. Pulmonary:      Effort: Pulmonary effort is normal.      Breath sounds: Normal breath sounds. Abdominal:      General: Bowel sounds are normal. There is no distension. Palpations: Abdomen is soft. Tenderness: There is no abdominal tenderness. Musculoskeletal:         General: Normal range of motion. Cervical back: Normal range of motion. Feet:    Feet:      Left foot:      Skin integrity: Erythema present. Skin:     General: Skin is warm and dry. Capillary Refill: Capillary refill takes less than 2 seconds. Neurological:      General: No focal deficit present. Mental Status: She is alert and oriented to person, place, and time. Psychiatric:         Mood and Affect: Mood normal.         Behavior: Behavior normal.         Investigations:      Laboratory Testing:  No results found for this or any previous visit (from the past 24 hour(s)). Imaging/Diagnostics:  No results found. Assessment :      Hospital Problems           Last Modified POA    Peripheral arterial disease ([address] 4/11/2022 Yes    Gangrene ([address] 4/11/2022 Yes    Primary hypertension 4/11/2022 Yes    Type 1 diabetes mellitus ([address] 4/11/2022 Yes          Plan:     Patient status inpatient in the Med/Surge    Inpatient consult to vascular surgery for recommendations and follow through. Manage DM: Patient may continue to use own insulin pump. Carb controlled diet with carb counting for dosing. BS ac/hs.    Manage HTN. Continue home meds. Pain control  Trend kidney function. Replete electrolytes as needed. DVT prophylaxis: Lovenox subcu daily. GI prophylaxis      Consultations:   IP CONSULT TO VASCULAR SURGERY     Patient is admitted as inpatient status because of co-morbidities listed above, severity of signs and symptoms as outlined, requirement for current medical therapies and most importantly because of direct risk to patient if care not provided in a hospital setting. Expected length of stay > 48 hours.     MERCED Thomas NP  4/11/2022  4:11 PM    Copy sent to Dr. Rylie Roa

## 2022-04-11 NOTE — CARE COORDINATION
Case Management Initial Discharge Plan  Coreen Mercer             Met with:patient to discuss discharge plans. Information verified: address, contacts, phone number, , insurance Yes  Insurance Provider: Zoila Almaraz    Emergency Contact/Next of Kin name & number: cortez [phone], sister Joe Culver [phone]  Who are involved in patient's support system? Daughter Lizzy, sisters    PCP: Maura Resendiz  Date of last visit: 2022      Discharge Planning    Living Arrangements:  [address] 2 [address]  3 stairs to climb to get into front door, 16 stairs to climb to reach second floor  Location of bedroom/bathroom in home upstairs    Patient able to perform ADL's:Independent    Current Services (outpatient & in home) none  DME equipment: none  DME provider: n/a    Is patient receiving oral anticoagulation therapy? No        Does patient have any issues/concerns obtaining medications? No      Is there a preferred Pharmacy after hours or on weekends? Yes    If yes, which pharmacy? [facility]    Potential Assistance Needed:  N/A    Patient agreeable to home care: No  Alpha of choice provided:  n/a    Prior SNF/Rehab Placement and Facility: N/A  Agreeable to SNF/Rehab: No  Alpha of choice provided: n/a     Evaluation: n/a    Expected Discharge date:  22    Patient expects to be discharged to:   home    If home: is the family and/or caregiver wiling & able to provide support at home? Yes   Who will be providing this support? Cortez Fink*    Follow Up Appointment: Best Day/ Time:      Transportation provider: sister Gladys Mccauley arrangements needed for discharge: No    Readmission Risk              Risk of Unplanned Readmission:  5             Does patient have a readmission risk score greater than 14?: No  If yes, follow-up appointment must be made within 7 days of discharge.      Goals of Care: no left leg pain      Educated patient on transitional options, provided freedom of choice and are agreeable with plan      Discharge Plan: home with daughter Lizzy          Electronically signed by Trixie Bahena RN on 4/11/22 at 4:52 PM EDT

## 2022-04-11 NOTE — CONSULTS
[address]      Patient's Name/ Date of Birth/ Gender: Celestina Loya / 1965 (49 y.o.) / female     Referring Physician: Floresita Sherman DO    Consulting Physician: Dr. Jean-Pierre Brewer    History of present Illness: Pt is a 49 y.o. female, with medical history of type 1 diabetes mellitus and hypertension who presents as a direct admission from vascular surgery clinic for concern for critical ischemia of the left foot. She has gangrene to the left great toe. Pain has been ongoing for several months, in March she was seen at [facility] and had a left femoral stent placed shortly following discharge from the procedure she had increased pain and was found that her stent had clotted off this was treated with TPA was started on oral anticoagulation at that time. She presented to the vascular clinic today and had difficult to palpate pulses with continued pain to her left great toe. Decision was made to have her admitted as inpatient for further work-up and possible surgical intervention. Past Medical History:  has a past medical history of HTN (hypertension) and Type 1 diabetes mellitus ([address] Past Surgical History:   Past Surgical History:   Procedure Laterality Date    APPENDECTOMY       SECTION      COCCYX REMOVAL         Social History:  reports that she quit smoking about 3 months ago. She quit after 25.00 years of use. She has never used smokeless tobacco. She reports current alcohol use. She reports that she does not use drugs. Family History: family history includes Diabetes in her sister; Other in her father and mother. Allergies: Patient has no allergy information on record.     Current Meds:  Current Facility-Administered Medications:     albuterol (PROVENTIL) nebulizer solution 2.5 mg, 2.5 mg, Nebulization, 4x Daily PRN, Floresita Sherman DO    amLODIPine (NORVASC) tablet 10 mg, 10 mg, Oral, Daily, Floresita Sherman DO    [START ON 2022] rx sent   buPROPion (WELLBUTRIN XL) extended release tablet 150 mg, 150 mg, Oral, QAM, Milvia Us DO    metoprolol tartrate (LOPRESSOR) tablet 12.5 mg, 12.5 mg, Oral, BID, Milvia Us DO    sodium chloride flush 0.9 % injection 5-40 mL, 5-40 mL, IntraVENous, 2 times per day, Milvia Us DO    sodium chloride flush 0.9 % injection 5-40 mL, 5-40 mL, IntraVENous, PRN, Milvia Us DO    0.9 % sodium chloride infusion, , IntraVENous, PRN, Milvia Us DO    enoxaparin (LOVENOX) injection 40 mg, 40 mg, SubCUTAneous, Daily, Milvia Us DO    ondansetron (ZOFRAN-ODT) disintegrating tablet 4 mg, 4 mg, Oral, Q8H PRN **OR** ondansetron (ZOFRAN) injection 4 mg, 4 mg, IntraVENous, Q6H PRN, Milvia Us DO    acetaminophen (TYLENOL) tablet 650 mg, 650 mg, Oral, Q6H PRN **OR** acetaminophen (TYLENOL) suppository 650 mg, 650 mg, Rectal, Q6H PRN, Milvia Us DO    polyethylene glycol (GLYCOLAX) packet 17 g, 17 g, Oral, Daily PRN, Milvia Us DO    REVIEW OF SYSTEMS:      Review of Systems - General ROS: negative for - chills, fatigue, fever or night sweats  Psychological ROS: negative for - anxiety, behavioral disorder or depression  Ophthalmic ROS: negative for - blurry vision, dry eyes or eye pain  ENT ROS: negative for - epistaxis, headaches or nasal congestion  Hematological and Lymphatic ROS: negative for - bleeding problems, blood clots or bruising  Endocrine ROS: negative for - malaise/lethargy, mood swings or palpitations  Respiratory ROS: negative for - cough, hemoptysis or shortness of breath  Cardiovascular ROS: no chest pain or dyspnea on exertion  Gastrointestinal ROS: no abdominal pain, constipation, hematochezia  Genito-Urinary ROS: no dysuria, trouble voiding, or hematuria  Musculoskeletal ROS: Left toe pain and calf cramping  Neurological ROS: TIA or stroke like symptom  Dermatological ROS: negative for dry skin and eczema    PHYSICAL EXAM:    VITALS:  BP (!) 135/93   Pulse 76   Temp 98.1 °F (36.7 °C) (Oral)   Resp 16   Wt 108 lb 0.4 oz (49 kg)   SpO2 99%   INTAKE/OUTPUT:   No intake or output data in the 24 hours ending 04/11/22 1641    CONSTITUTIONAL:  Alert and oriented, no acute distress  HEAD: normocephalic, atraumatic  EYES: Pupils equal and reactive to light, Extraocular muscles intact, sclera non icteric  ENT: Mucus membranes moist, No otorrhea, no rhinorrhea  NECK:  supple, symmetrical, trachea midline   LUNGS:  Good air movement bilaterally, unlabored respirations  CARDIOVASCULAR: Regular rate and rhythm  ABDOMEN: soft, non tender, non distended, no rebound or guarding  : Deferred  Rectal:  Deferredd  MUSCULOSKELETAL:  Equal strength bilaterally, normal muscle tone, severe pain with palpation of left toe  SKIN: Dark discoloration to the tip of her left toe  Ext: Palpable signals on right DP/PT and left DP, Dopplerable signals on left PT  NEUROLOGIC:  Cranial nerves 2-12 grossly intact, no focal deficits  PSYCH: affect appropriate    Labs:   Lab Results   Component Value Date    WBC 6.6 04/03/2022    HGB 13.5 04/03/2022    HCT 38.8 04/03/2022    MCV 93.3 04/03/2022     04/03/2022     Lab Results   Component Value Date     04/03/2022    K 3.7 04/03/2022    CL 96 04/03/2022    CO2 26 04/03/2022    BUN 8 04/03/2022    CREATININE 0.82 04/03/2022    GLUCOSE 209 04/03/2022    CALCIUM 9.7 04/03/2022     Lab Results   Component Value Date    INR 1.3 04/03/2022       Imaging:  XR CHEST (SINGLE VIEW FRONTAL)    Result Date: 4/11/2022  No evidence of acute cardiopulmonary disease. Impression:  Patient Active Problem List   Diagnosis    Peripheral arterial disease ([address]    Gangrene ([address]    Primary hypertension    Type 1 diabetes mellitus ([address]       1. Patient is a 53-year-old female with peripheral artery disease and gangrene of left great toe    Recommendation:    1. Patient tentatively on the schedule for possible bypass tomorrow afternoon  2.  Spoke with anesthesia since patient is a type I diabetic she must be n.p.o. for 8 hours prior to her procedure, prefer her to eat until 8 hours prior  3. Will start heparin gtt  4. Will follow up labs  5. If kidney function is normal will order CTA aorta with bilateral runoff tonight  6.  Continue neurovascular checks    Discussed with Dr. Claudio Farrell,   4/11/2022

## 2022-04-12 ENCOUNTER — ANESTHESIA (OUTPATIENT)
Dept: OPERATING ROOM | Age: 57
DRG: 181 | End: 2022-04-12
Payer: COMMERCIAL

## 2022-04-12 ENCOUNTER — ANESTHESIA EVENT (OUTPATIENT)
Dept: OPERATING ROOM | Age: 57
DRG: 181 | End: 2022-04-12
Payer: COMMERCIAL

## 2022-04-12 ENCOUNTER — APPOINTMENT (OUTPATIENT)
Dept: CARDIAC CATH/INVASIVE PROCEDURES | Age: 57
DRG: 181 | End: 2022-04-12
Attending: INTERNAL MEDICINE
Payer: COMMERCIAL

## 2022-04-12 LAB
ABSOLUTE EOS #: 0.22 K/UL (ref 0–0.44)
ABSOLUTE IMMATURE GRANULOCYTE: 0.06 K/UL (ref 0–0.3)
ABSOLUTE LYMPH #: 3.25 K/UL (ref 1.1–3.7)
ABSOLUTE MONO #: 0.75 K/UL (ref 0.1–1.2)
ACT TEG: 160 SEC (ref 86–118)
ALBUMIN SERPL-MCNC: 3.3 G/DL (ref 3.5–5.2)
ANGLE, RAPID TEG: 76.3 DEG (ref 64–80)
ANION GAP SERPL CALCULATED.3IONS-SCNC: 9 MMOL/L (ref 9–17)
BASOPHILS # BLD: 1 % (ref 0–2)
BASOPHILS ABSOLUTE: 0.06 K/UL (ref 0–0.2)
BUN BLDV-MCNC: 10 MG/DL (ref 6–20)
CALCIUM SERPL-MCNC: 8.8 MG/DL (ref 8.6–10.4)
CHLORIDE BLD-SCNC: 102 MMOL/L (ref 98–107)
CO2: 21 MMOL/L (ref 20–31)
CREAT SERPL-MCNC: 0.9 MG/DL (ref 0.5–0.9)
EOSINOPHILS RELATIVE PERCENT: 2 % (ref 1–4)
EPL-TEG: 0 % (ref 0–15)
ESTIMATED AVERAGE GLUCOSE: 166 MG/DL
GFR AFRICAN AMERICAN: >60 ML/MIN
GFR NON-AFRICAN AMERICAN: >60 ML/MIN
GFR SERPL CREATININE-BSD FRML MDRD: ABNORMAL ML/MIN/{1.73_M2}
GLUCOSE BLD-MCNC: 147 MG/DL (ref 70–99)
GLUCOSE BLD-MCNC: 153 MG/DL (ref 65–105)
GLUCOSE BLD-MCNC: 162 MG/DL (ref 65–105)
GLUCOSE BLD-MCNC: 167 MG/DL (ref 65–105)
GLUCOSE BLD-MCNC: 180 MG/DL (ref 65–105)
GLUCOSE BLD-MCNC: 77 MG/DL (ref 65–105)
HBA1C MFR BLD: 7.4 % (ref 4–6)
HCT VFR BLD CALC: 37.5 % (ref 36.3–47.1)
HCT VFR BLD CALC: 37.7 % (ref 36.3–47.1)
HEMOGLOBIN: 12.1 G/DL (ref 11.9–15.1)
HEMOGLOBIN: 13 G/DL (ref 11.9–15.1)
HEPARIN THERAPY: ABNORMAL
IMMATURE GRANULOCYTES: 1 %
KINETICS RAPID TEG: 0.9 MIN (ref 1–2)
LY30 (LYSIS) TEG: 0 % (ref 0–8)
LYMPHOCYTES # BLD: 28 % (ref 24–43)
MA(MAX CLOT) RAPID TEG: 71.6 MM (ref 52–71)
MAGNESIUM: 1.9 MG/DL (ref 1.6–2.6)
MCH RBC QN AUTO: 32.2 PG (ref 25.2–33.5)
MCH RBC QN AUTO: 32.6 PG (ref 25.2–33.5)
MCHC RBC AUTO-ENTMCNC: 32.3 G/DL (ref 28.4–34.8)
MCHC RBC AUTO-ENTMCNC: 34.5 G/DL (ref 28.4–34.8)
MCV RBC AUTO: 94.5 FL (ref 82.6–102.9)
MCV RBC AUTO: 99.7 FL (ref 82.6–102.9)
MONOCYTES # BLD: 6 % (ref 3–12)
NRBC AUTOMATED: 0 PER 100 WBC
NRBC AUTOMATED: 0 PER 100 WBC
PARTIAL THROMBOPLASTIN TIME: 49.8 SEC (ref 20.5–30.5)
PARTIAL THROMBOPLASTIN TIME: 53.4 SEC (ref 20.5–30.5)
PARTIAL THROMBOPLASTIN TIME: >120 SEC (ref 20.5–30.5)
PARTIAL THROMBOPLASTIN TIME: >120 SEC (ref 20.5–30.5)
PDW BLD-RTO: 13.6 % (ref 11.8–14.4)
PDW BLD-RTO: 13.8 % (ref 11.8–14.4)
PHOSPHORUS: 4.6 MG/DL (ref 2.6–4.5)
PLATELET # BLD: 318 K/UL (ref 138–453)
PLATELET # BLD: 355 K/UL (ref 138–453)
PMV BLD AUTO: 9.6 FL (ref 8.1–13.5)
PMV BLD AUTO: 9.8 FL (ref 8.1–13.5)
POTASSIUM SERPL-SCNC: 4.1 MMOL/L (ref 3.7–5.3)
RBC # BLD: 3.76 M/UL (ref 3.95–5.11)
RBC # BLD: 3.99 M/UL (ref 3.95–5.11)
REACTION TIME RAPID TEG: 1.2 MIN (ref 0–1)
SEG NEUTROPHILS: 63 % (ref 36–65)
SEGMENTED NEUTROPHILS ABSOLUTE COUNT: 7.4 K/UL (ref 1.5–8.1)
SODIUM BLD-SCNC: 132 MMOL/L (ref 135–144)
TEG COMMENT: ABNORMAL
WBC # BLD: 11.7 K/UL (ref 3.5–11.3)
WBC # BLD: 6.2 K/UL (ref 3.5–11.3)

## 2022-04-12 PROCEDURE — 37184 PRIM ART M-THRMBC 1ST VSL: CPT

## 2022-04-12 PROCEDURE — C1725 CATH, TRANSLUMIN NON-LASER: HCPCS

## 2022-04-12 PROCEDURE — 83735 ASSAY OF MAGNESIUM: CPT

## 2022-04-12 PROCEDURE — 6360000004 HC RX CONTRAST MEDICATION

## 2022-04-12 PROCEDURE — 6360000002 HC RX W HCPCS

## 2022-04-12 PROCEDURE — 83036 HEMOGLOBIN GLYCOSYLATED A1C: CPT

## 2022-04-12 PROCEDURE — 37226 HC FEM POP TERR PLASTY AND STENT: CPT

## 2022-04-12 PROCEDURE — 36415 COLL VENOUS BLD VENIPUNCTURE: CPT

## 2022-04-12 PROCEDURE — 85730 THROMBOPLASTIN TIME PARTIAL: CPT

## 2022-04-12 PROCEDURE — 2709999900 HC NON-CHARGEABLE SUPPLY

## 2022-04-12 PROCEDURE — 85390 FIBRINOLYSINS SCREEN I&R: CPT

## 2022-04-12 PROCEDURE — 10701622 HC MISC CATH THROMBECTOMY

## 2022-04-12 PROCEDURE — 6360000002 HC RX W HCPCS: Performed by: STUDENT IN AN ORGANIZED HEALTH CARE EDUCATION/TRAINING PROGRAM

## 2022-04-12 PROCEDURE — C1757 CATH, THROMBECTOMY/EMBOLECT: HCPCS

## 2022-04-12 PROCEDURE — 10701622 HC MISC CATH TRANSLUM ANGIO

## 2022-04-12 PROCEDURE — 10701622 HC MISC STENT COATED/CVRD W DEL SYS

## 2022-04-12 PROCEDURE — C1874 STENT, COATED/COV W/DEL SYS: HCPCS

## 2022-04-12 PROCEDURE — 6370000000 HC RX 637 (ALT 250 FOR IP): Performed by: ANESTHESIOLOGY

## 2022-04-12 PROCEDURE — 3E05317 INTRODUCTION OF OTHER THROMBOLYTIC INTO PERIPHERAL ARTERY, PERCUTANEOUS APPROACH: ICD-10-PCS | Performed by: SURGERY

## 2022-04-12 PROCEDURE — 2500000003 HC RX 250 WO HCPCS

## 2022-04-12 PROCEDURE — 04CL3ZZ EXTIRPATION OF MATTER FROM LEFT FEMORAL ARTERY, PERCUTANEOUS APPROACH: ICD-10-PCS | Performed by: SURGERY

## 2022-04-12 PROCEDURE — X27L385 DILATION OF PROXIMAL LEFT POPLITEAL ARTERY WITH SUSTAINED RELEASE DRUG-ELUTING INTRALUMINAL DEVICE, PERCUTANEOUS APPROACH, NEW TECHNOLOGY GROUP 5: ICD-10-PCS | Performed by: SURGERY

## 2022-04-12 PROCEDURE — 80069 RENAL FUNCTION PANEL: CPT

## 2022-04-12 PROCEDURE — 85025 COMPLETE CBC W/AUTO DIFF WBC: CPT

## 2022-04-12 PROCEDURE — 85027 COMPLETE CBC AUTOMATED: CPT

## 2022-04-12 PROCEDURE — 85210 CLOT FACTOR II PROTHROM SPEC: CPT

## 2022-04-12 PROCEDURE — 7100000010 HC PHASE II RECOVERY - FIRST 15 MIN

## 2022-04-12 PROCEDURE — C1894 INTRO/SHEATH, NON-LASER: HCPCS

## 2022-04-12 PROCEDURE — 6370000000 HC RX 637 (ALT 250 FOR IP): Performed by: INTERNAL MEDICINE

## 2022-04-12 PROCEDURE — 82947 ASSAY GLUCOSE BLOOD QUANT: CPT

## 2022-04-12 PROCEDURE — 6370000000 HC RX 637 (ALT 250 FOR IP): Performed by: STUDENT IN AN ORGANIZED HEALTH CARE EDUCATION/TRAINING PROGRAM

## 2022-04-12 PROCEDURE — C1769 GUIDE WIRE: HCPCS

## 2022-04-12 PROCEDURE — 10701622 HC MISC INTRODUCER/SHEATH

## 2022-04-12 PROCEDURE — 7100000011 HC PHASE II RECOVERY - ADDTL 15 MIN

## 2022-04-12 PROCEDURE — 75710 ARTERY X-RAYS ARM/LEG: CPT

## 2022-04-12 PROCEDURE — 37185 PRIM ART M-THRMBC SBSQ VSL: CPT

## 2022-04-12 PROCEDURE — 99232 SBSQ HOSP IP/OBS MODERATE 35: CPT | Performed by: INTERNAL MEDICINE

## 2022-04-12 PROCEDURE — 2580000003 HC RX 258: Performed by: STUDENT IN AN ORGANIZED HEALTH CARE EDUCATION/TRAINING PROGRAM

## 2022-04-12 PROCEDURE — 6360000002 HC RX W HCPCS: Performed by: INTERNAL MEDICINE

## 2022-04-12 PROCEDURE — 10701622 HC CATHETER GUIDING

## 2022-04-12 PROCEDURE — 10701622 HC MISC GUIDEWIRE

## 2022-04-12 PROCEDURE — 04CN3ZZ EXTIRPATION OF MATTER FROM LEFT POPLITEAL ARTERY, PERCUTANEOUS APPROACH: ICD-10-PCS | Performed by: SURGERY

## 2022-04-12 PROCEDURE — C1887 CATHETER, GUIDING: HCPCS

## 2022-04-12 PROCEDURE — 2060000000 HC ICU INTERMEDIATE R&B

## 2022-04-12 PROCEDURE — 2700000000 HC OXYGEN THERAPY PER DAY

## 2022-04-12 PROCEDURE — C1760 CLOSURE DEV, VASC: HCPCS

## 2022-04-12 PROCEDURE — B41GYZZ FLUOROSCOPY OF LEFT LOWER EXTREMITY ARTERIES USING OTHER CONTRAST: ICD-10-PCS | Performed by: SURGERY

## 2022-04-12 PROCEDURE — X27J385 DILATION OF LEFT FEMORAL ARTERY WITH SUSTAINED RELEASE DRUG-ELUTING INTRALUMINAL DEVICE, PERCUTANEOUS APPROACH, NEW TECHNOLOGY GROUP 5: ICD-10-PCS | Performed by: SURGERY

## 2022-04-12 PROCEDURE — 93005 ELECTROCARDIOGRAM TRACING: CPT | Performed by: STUDENT IN AN ORGANIZED HEALTH CARE EDUCATION/TRAINING PROGRAM

## 2022-04-12 PROCEDURE — 10701622 HC NON-CHARGEABLE SUPPLY

## 2022-04-12 RX ORDER — ACETAMINOPHEN 325 MG/1
650 TABLET ORAL EVERY 4 HOURS PRN
Status: DISCONTINUED | OUTPATIENT
Start: 2022-04-12 | End: 2022-04-13

## 2022-04-12 RX ORDER — GABAPENTIN 300 MG/1
300 CAPSULE ORAL EVERY 8 HOURS SCHEDULED
Status: DISCONTINUED | OUTPATIENT
Start: 2022-04-12 | End: 2022-04-18

## 2022-04-12 RX ORDER — NICOTINE POLACRILEX 4 MG
15 LOZENGE BUCCAL PRN
Status: DISCONTINUED | OUTPATIENT
Start: 2022-04-12 | End: 2022-04-18 | Stop reason: HOSPADM

## 2022-04-12 RX ORDER — NALOXONE HYDROCHLORIDE 0.4 MG/ML
0.4 INJECTION, SOLUTION INTRAMUSCULAR; INTRAVENOUS; SUBCUTANEOUS PRN
Status: DISCONTINUED | OUTPATIENT
Start: 2022-04-12 | End: 2022-04-15

## 2022-04-12 RX ORDER — SODIUM CHLORIDE 9 MG/ML
25 INJECTION, SOLUTION INTRAVENOUS PRN
Status: DISCONTINUED | OUTPATIENT
Start: 2022-04-12 | End: 2022-04-18 | Stop reason: HOSPADM

## 2022-04-12 RX ORDER — ASPIRIN 81 MG/1
81 TABLET, CHEWABLE ORAL DAILY
Status: DISCONTINUED | OUTPATIENT
Start: 2022-04-12 | End: 2022-04-18 | Stop reason: HOSPADM

## 2022-04-12 RX ORDER — CILOSTAZOL 100 MG/1
50 TABLET ORAL 2 TIMES DAILY
Status: DISCONTINUED | OUTPATIENT
Start: 2022-04-12 | End: 2022-04-12

## 2022-04-12 RX ORDER — DEXTROSE MONOHYDRATE 25 G/50ML
12.5 INJECTION, SOLUTION INTRAVENOUS PRN
Status: DISCONTINUED | OUTPATIENT
Start: 2022-04-12 | End: 2022-04-18 | Stop reason: HOSPADM

## 2022-04-12 RX ORDER — GABAPENTIN 300 MG/1
300 CAPSULE ORAL EVERY 8 HOURS SCHEDULED
Status: DISCONTINUED | OUTPATIENT
Start: 2022-04-12 | End: 2022-04-12

## 2022-04-12 RX ORDER — OXYCODONE HYDROCHLORIDE 5 MG/1
5 TABLET ORAL EVERY 4 HOURS PRN
Status: DISCONTINUED | OUTPATIENT
Start: 2022-04-12 | End: 2022-04-18 | Stop reason: HOSPADM

## 2022-04-12 RX ORDER — SODIUM CHLORIDE 0.9 % (FLUSH) 0.9 %
5-40 SYRINGE (ML) INJECTION EVERY 12 HOURS SCHEDULED
Status: DISCONTINUED | OUTPATIENT
Start: 2022-04-12 | End: 2022-04-18 | Stop reason: HOSPADM

## 2022-04-12 RX ORDER — SODIUM CHLORIDE, SODIUM LACTATE, POTASSIUM CHLORIDE, AND CALCIUM CHLORIDE .6; .31; .03; .02 G/100ML; G/100ML; G/100ML; G/100ML
1000 INJECTION, SOLUTION INTRAVENOUS ONCE
Status: COMPLETED | OUTPATIENT
Start: 2022-04-12 | End: 2022-04-12

## 2022-04-12 RX ORDER — SODIUM CHLORIDE 0.9 % (FLUSH) 0.9 %
5-40 SYRINGE (ML) INJECTION PRN
Status: DISCONTINUED | OUTPATIENT
Start: 2022-04-12 | End: 2022-04-18 | Stop reason: HOSPADM

## 2022-04-12 RX ORDER — SCOLOPAMINE TRANSDERMAL SYSTEM 1 MG/1
1 PATCH, EXTENDED RELEASE TRANSDERMAL ONCE
Status: COMPLETED | OUTPATIENT
Start: 2022-04-12 | End: 2022-04-15

## 2022-04-12 RX ORDER — DEXTROSE MONOHYDRATE 50 MG/ML
100 INJECTION, SOLUTION INTRAVENOUS PRN
Status: DISCONTINUED | OUTPATIENT
Start: 2022-04-12 | End: 2022-04-18 | Stop reason: HOSPADM

## 2022-04-12 RX ORDER — NIFEDIPINE 30 MG/1
30 TABLET, EXTENDED RELEASE ORAL DAILY
Status: DISCONTINUED | OUTPATIENT
Start: 2022-04-12 | End: 2022-04-18 | Stop reason: HOSPADM

## 2022-04-12 RX ORDER — CILOSTAZOL 100 MG/1
100 TABLET ORAL 2 TIMES DAILY
Status: DISCONTINUED | OUTPATIENT
Start: 2022-04-12 | End: 2022-04-18 | Stop reason: HOSPADM

## 2022-04-12 RX ADMIN — NITROGLYCERIN 0.5 INCH: 20 OINTMENT TOPICAL at 19:07

## 2022-04-12 RX ADMIN — HEPARIN SODIUM 20 UNITS/KG/HR: 5000 INJECTION, SOLUTION INTRAVENOUS; SUBCUTANEOUS at 00:29

## 2022-04-12 RX ADMIN — OXYCODONE 5 MG: 5 TABLET ORAL at 20:26

## 2022-04-12 RX ADMIN — HEPARIN SODIUM 1960 UNITS: 1000 INJECTION INTRAVENOUS; SUBCUTANEOUS at 00:29

## 2022-04-12 RX ADMIN — METOPROLOL TARTRATE 12.5 MG: 25 TABLET ORAL at 21:06

## 2022-04-12 RX ADMIN — SODIUM CHLORIDE, PRESERVATIVE FREE 10 ML: 5 INJECTION INTRAVENOUS at 20:57

## 2022-04-12 RX ADMIN — Medication 100 MCG: at 22:41

## 2022-04-12 RX ADMIN — ASPIRIN 81 MG: 81 TABLET, CHEWABLE ORAL at 20:51

## 2022-04-12 RX ADMIN — HYDROMORPHONE HYDROCHLORIDE 0.5 MG: 1 INJECTION, SOLUTION INTRAMUSCULAR; INTRAVENOUS; SUBCUTANEOUS at 19:09

## 2022-04-12 RX ADMIN — HYDROMORPHONE HYDROCHLORIDE 2 MG: 1 INJECTION, SOLUTION INTRAMUSCULAR; INTRAVENOUS; SUBCUTANEOUS at 17:16

## 2022-04-12 RX ADMIN — MORPHINE SULFATE 4 MG: 4 INJECTION INTRAVENOUS at 16:22

## 2022-04-12 RX ADMIN — SODIUM CHLORIDE, POTASSIUM CHLORIDE, SODIUM LACTATE AND CALCIUM CHLORIDE 1000 ML: 600; 310; 30; 20 INJECTION, SOLUTION INTRAVENOUS at 20:50

## 2022-04-12 RX ADMIN — MORPHINE SULFATE 4 MG: 4 INJECTION INTRAVENOUS at 08:49

## 2022-04-12 RX ADMIN — MORPHINE SULFATE 4 MG: 4 INJECTION INTRAVENOUS at 05:40

## 2022-04-12 RX ADMIN — HEPARIN SODIUM 1960 UNITS: 1000 INJECTION INTRAVENOUS; SUBCUTANEOUS at 09:31

## 2022-04-12 RX ADMIN — SODIUM CHLORIDE, PRESERVATIVE FREE 10 ML: 5 INJECTION INTRAVENOUS at 20:59

## 2022-04-12 RX ADMIN — ACETAMINOPHEN 650 MG: 325 TABLET ORAL at 17:04

## 2022-04-12 RX ADMIN — NIFEDIPINE 30 MG: 30 TABLET, FILM COATED, EXTENDED RELEASE ORAL at 19:14

## 2022-04-12 RX ADMIN — HYDROMORPHONE HYDROCHLORIDE 0.5 MG: 1 INJECTION, SOLUTION INTRAMUSCULAR; INTRAVENOUS; SUBCUTANEOUS at 20:57

## 2022-04-12 RX ADMIN — MORPHINE SULFATE 4 MG: 4 INJECTION INTRAVENOUS at 02:41

## 2022-04-12 RX ADMIN — BUPROPION HYDROCHLORIDE 150 MG: 150 TABLET, EXTENDED RELEASE ORAL at 08:45

## 2022-04-12 RX ADMIN — MORPHINE SULFATE 4 MG: 4 INJECTION INTRAVENOUS at 12:09

## 2022-04-12 RX ADMIN — GABAPENTIN 300 MG: 300 CAPSULE ORAL at 20:51

## 2022-04-12 RX ADMIN — CILOSTAZOL 100 MG: 100 TABLET ORAL at 19:06

## 2022-04-12 ASSESSMENT — PAIN SCALES - GENERAL
PAINLEVEL_OUTOF10: 10
PAINLEVEL_OUTOF10: 6
PAINLEVEL_OUTOF10: 8
PAINLEVEL_OUTOF10: 9
PAINLEVEL_OUTOF10: 10
PAINLEVEL_OUTOF10: 4
PAINLEVEL_OUTOF10: 10
PAINLEVEL_OUTOF10: 10
PAINLEVEL_OUTOF10: 3
PAINLEVEL_OUTOF10: 10
PAINLEVEL_OUTOF10: 4
PAINLEVEL_OUTOF10: 8
PAINLEVEL_OUTOF10: 10
PAINLEVEL_OUTOF10: 9
PAINLEVEL_OUTOF10: 10

## 2022-04-12 ASSESSMENT — PAIN DESCRIPTION - FREQUENCY: FREQUENCY: CONTINUOUS

## 2022-04-12 ASSESSMENT — PAIN DESCRIPTION - PAIN TYPE: TYPE: CHRONIC PAIN

## 2022-04-12 ASSESSMENT — PAIN DESCRIPTION - LOCATION: LOCATION: FOOT

## 2022-04-12 ASSESSMENT — PAIN DESCRIPTION - ORIENTATION: ORIENTATION: LEFT

## 2022-04-12 ASSESSMENT — PAIN DESCRIPTION - DESCRIPTORS: DESCRIPTORS: ACHING;CONSTANT

## 2022-04-12 NOTE — OP NOTE
Operative Note      Patient: Morales Anand  YOB: 1965  MRN: [id number]    Date of Procedure: 4/12/2022    Pre-Op Diagnosis: ASO WITH ULCERATION, GANGRENE    Post-Op Diagnosis: Same         Surgeon(s):  Tana Foster MD    Assistant:   * No surgical staff found *    Anesthesia: Versed 4mg, Fentanyl 150mcg    Estimated Blood Loss (mL): Minimal    Complications: None    Specimens:   * No specimens in log *    Implants:  * No implants in log *      Drains: * No LDAs found *    Findings:   Left SFA stent was thrombosed with distal reconstitution in the above-knee popliteal artery with three-vessel tibial runoff    Detailed Description of Procedure: This is a 62-year-old female who had a left leg stenting approximately a month ago at an outside hospital.  She continued to have significant pain and developed gangrene in the left great toe. She presented a few days ago in the office with no dopplerable signal in the foot. She was admitted to the hospital and the CT scan showed occlusion of the left SFA stent. She is brought to the Cath Lab placed supine the groins were cleaned and prepped and sterile drapes were applied. Ultrasound was used to identify the right common femoral artery. Using a micropuncture needle and catheter access was gained and a 5 Luxembourger sheath was placed. The bifurcation was successfully crossed and a 7 Luxembour[place] 45 cm sheath was placed. Using a support catheter and a Glidewire the SFA stent was easily crossed and reentry was gained into the true lumen again which showed three-vessel tibial runoff. AngioJet mechanical thrombectomy was performed of the left SFA and popliteal artery. A total of 6 mg of alteplase was pulse irrigated into the thrombus and allowed to dwell for 10 minutes. Suction thrombectomy was then performed.   Balloon angioplasty of the entire SFA showed some residual stenosis in the above-knee popliteal artery distal to the original stent as well as proximal.  A 6 x 60 and a 6 x 120 mm stent were used to extend into the popliteal artery and SFA. Patient continues to have significant discomfort in the left lower extremity and there was vasospasm of the tibial vessels. Multiple infusions of nitroglycerin were injected intra-arterially to relieve the spasm. Angioplasty was performed of the proximal SFA as well which appeared spasmed. A minx closure was used to close the arteriotomy.   The procedure was tolerated well without complication    Electronically signed by Austin Pineda MD on 4/12/2022 at 4:03 PM

## 2022-04-12 NOTE — PLAN OF CARE
Problem: Falls - Risk of:  Goal: Will remain free from falls  Description: Will remain free from falls  4/12/2022 1145 by Ernesto Jimenez RN  Outcome: Ongoing  4/12/2022 1144 by Ernesto Jimenez RN  Outcome: Ongoing  4/12/2022 0710 by Norman Lei RN  Outcome: Ongoing  Goal: Absence of physical injury  Description: Absence of physical injury  4/12/2022 1145 by Ernesto Jimenez RN  Outcome: Ongoing  4/12/2022 1144 by Ernesto Jimenez RN  Outcome: Ongoing  4/12/2022 0710 by Norman Lei RN  Outcome: Ongoing     Problem: Infection:  Goal: Will remain free from infection  Description: Will remain free from infection  Outcome: Ongoing     Problem: Safety:  Goal: Free from accidental physical injury  Description: Free from accidental physical injury  Outcome: Ongoing  Goal: Free from intentional harm  Description: Free from intentional harm  Outcome: Ongoing     Problem: Daily Care:  Goal: Daily care needs are met  Description: Daily care needs are met  Outcome: Ongoing     Problem: Pain:  Goal: Patient's pain/discomfort is manageable  Description: Patient's pain/discomfort is manageable  Outcome: Ongoing     Problem: Skin Integrity:  Goal: Skin integrity will stabilize  Description: Skin integrity will stabilize  Outcome: Ongoing     Problem: Discharge Planning:  Goal: Patients continuum of care needs are met  Description: Patients continuum of care needs are met  Outcome: Ongoing

## 2022-04-12 NOTE — PROGRESS NOTES
Patient's own insulin pump was showing blood sugar at 65. Patient's blood sugar was then checked with hospital glucometer; and it showed 167. Patient had checked her blood sugar with another glucometer owned by the patient ( blood sugar 85). We rechecked the blood sugar and was 77. Rufus Ashby [place] juice was given to patient. Notified NP Dane Ellis. Hypoglycemia protocol orders given.

## 2022-04-12 NOTE — POST SEDATION
Sedation Post Procedure Note    Patient Name: Yeison Cole   YOB: 1965  Room/Bed: [address] [place] [address]  Medical Record Number: [id number]  Date: 4/12/2022   Time: 4:01 PM         Physicians/Assistants: Eugene Simeon MD, MD    Procedure Performed:    1)  US guided access right CFA  2)  LLE angiogram, pharmacomechanical thrombectomy, angioplasty, stenting    Post-Sedation Vital Signs:  Vitals:    04/12/22 1415   BP: 121/67   Pulse: 63   Resp: 16   Temp:    SpO2: 98%      Vital signs were reviewed and were stable after the procedure (see flow sheet for vitals)            Post-Sedation Exam: Lungs: clear and Cardiovascular: normal           Complications: none    Electronically signed by Eugene Simeon MD on 4/12/2022 at 4:01 PM

## 2022-04-12 NOTE — PLAN OF CARE
Nutrition Problem #1: Inadequate oral intake  Intervention: Food and/or Nutrient Delivery: Continue NPO (Monitor for start of oral diet and provide ONS with meals as able.)  Nutritional Goals: Meet at least 50% of estimated nutrition needs.

## 2022-04-12 NOTE — PRE SEDATION
Sedation Pre-Procedure Note    Patient Name: Alice Lu   YOB: 1965  Room/Bed: 0512/0512-01  Medical Record Number: [id number]  Date: 2022   Time: 2:32 PM       Indication:  Left leg ischemic rest pain    Consent: I have discussed with the patient and/or the patient representative the indication, alternatives, and the possible risks and/or complications of the planned procedure and the anesthesia methods. The patient and/or patient representative appear to understand and agree to proceed. Vital Signs:   Vitals:    22 1415   BP: 121/67   Pulse: 63   Resp: 16   Temp:    SpO2: 98%       Past Medical History:   has a past medical history of Former smoker, Gangrene of toe ([facility] HTN (hypertension), PVD (peripheral vascular disease) ([facility] [facility] and Type 1 diabetes mellitus ([facility] Past Surgical History:   has a past surgical history that includes Appendectomy;  section; Coccyx removal; vascular surgery (Left, 2022); vascular surgery (2022); Hysterectomy (); Cardiac catheterization; and vascular surgery (2022). Medications:   Scheduled Meds:    insulin lispro  0-6 Units SubCUTAneous TID WC    insulin lispro  0-3 Units SubCUTAneous Nightly    amLODIPine  10 mg Oral Daily    buPROPion  150 mg Oral QAM    metoprolol tartrate  12.5 mg Oral BID    sodium chloride flush  5-40 mL IntraVENous 2 times per day     Continuous Infusions:    dextrose      sodium chloride      heparin (PORCINE) Infusion 22 Units/kg/hr (22 0854)     PRN Meds: glucose, dextrose, glucagon (rDNA), dextrose, albuterol, sodium chloride flush, sodium chloride, ondansetron **OR** ondansetron, acetaminophen **OR** acetaminophen, polyethylene glycol, heparin (porcine), heparin (porcine), morphine  Home Meds:   Prior to Admission medications    Medication Sig Start Date End Date Taking?  Authorizing Provider   albuterol (ACCUNEB) 0.63 MG/3ML nebulizer solution Take 1 ampule by nebulization every 4 hours as needed for Wheezing    Historical Provider, MD   amLODIPine (NORVASC) 10 MG tablet Take 10 mg by mouth daily    Historical Provider, MD   metoprolol tartrate (LOPRESSOR) 25 MG tablet Take 12.5 mg by mouth 2 times daily    Historical Provider, MD   Cholecalciferol (VITAMIN D3) 50 MCG (2000 UT) CAPS Take 2,000 Units by mouth daily    Historical Provider, MD   Insulin Pump Accessories MISC by Does not apply route    Historical Provider, MD   buPROPion (WELLBUTRIN XL) 150 MG extended release tablet Take 150 mg by mouth every morning    Historical Provider, MD   fenofibrate (TRICOR) 145 MG tablet Take 145 mg by mouth daily    Historical Provider, MD   acetaminophen (TYLENOL) 325 MG tablet Take 1 tablet by mouth every 6 hours as needed for Pain 4/3/22 4/10/22  Cyndi Cedillo MD   ibuprofen (IBU) 400 MG tablet Take 1 tablet by mouth every 6 hours as needed for Pain 4/3/22   Cyndi Cedillo MD     Coumadin Use Last 7 Days:  no  Antiplatelet drug therapy use last 7 days: no  Other anticoagulant use last 7 days: yes - Xarelto, heparin drip  Additional Medication Information:        Pre-Sedation Documentation and Exam:   I have personally completed a history, physical exam & review of systems for this patient (see notes).     Mallampati Airway Assessment:  Mallampati Class II - (soft palate, fauces & uvula are visible)    Prior History of Anesthesia Complications:   none    ASA Classification:  Class 2 - A normal healthy patient with mild systemic disease    Sedation/ Anesthesia Plan:   intravenous sedation    Medications Planned:   midazolam (Versed) intravenously and fentanyl intravenously    Patient is an appropriate candidate for plan of sedation: yes    Electronically signed by Zoe Lang MD on 4/12/2022 at 2:32 PM

## 2022-04-12 NOTE — PROGRESS NOTES
[facility]  Office: [address], DO, Tommy Watkins, DO, Alexsandra Syed, DO, Tonie Karla Blood, DO, Gerry Gutierrez MD, Sintia Sheridan MD, Jacky Pichardo MD, Carmen Gray MD, Mychal Brennan MD, Michelle Fitzpatrick MD, Brenton Das MD, Roger Cuadra, DO, Leodan Zendejas, DO, Geovany Ruelas MD,  Doreen Kumar, DO, Zo Dominguez MD, Gian Durant MD, David Zimmerman MD, Liam Perez, DO, Raeann Zuniga MD, Kate Vasquez MD, Rain Osuna, [facility], [facility] Velasquez, CNP, Noah Barthel, CNP, Clifton Mason, CNP, Loreta Ron, CNP, Caron Pittman, CNP, Malika Gonzalez PA-C, [facility], [facility], Bouchra Mccauley, CNP, Genesis Victor, CNP, Tiffanie Pruitt, CNP, Jersey Dong, CNS, Katalina Santana, [facility], Nick Saucedo, CNP, Mimi Montemayor, CNP, Arin Adams, [facility]         [address]    Progress Note    4/12/2022    12:28 PM    Name:   Karl Allen  MRN:     [id number]     Acct:      [de-identified]   Room:   [facility]/0512-01   Day:  1  Admit Date:  4/11/2022  2:59 PM    PCP:   Khloe Costello  Code Status:  Full Code    Subjective:     C/C: Pain left great toe    Interval History Status: . Continues to have pain left great toe and has a small area of gangrene  Currently n.p.o. for femoropopliteal bypass surgery today  Blood sugars 180, currently on insulin sliding scale  Has insulin pump in place, states has DM 1  Brief History:   Karl Allen is a 49 y.o. Non- / non  female who presents with No chief complaint on file. and is admitted to the hospital for the management of <principal problem not specified>.     Osiel Horn is a 49year old female transferred from vascular surgeon office for direct admission for pain and gangrene to the left great toe. She states that she has been seen over the past several months on several occasions for pain and vascular disease to the left leg.  She was admitted initially at [facility] and had a left femoral stent placed in March. Following discharge she was found to have clotted the stent and was re-admitted. She continued to have pain after discharge and had her sister drive her to [facility] ED where she had a vascular consult and established care with vascular MD. She went to office appointment today where she states they were unable to find pulses to her left foot. She was transferred for vascular intervention. She states that she has been unable to walk on the foot and pain has been unbearable requiring narcotic pain relief. In addition she has DM1 and HTN. She states she is compliant with home medications and has an insulin pump for DM management. Review of Systems:     Constitutional:  negative for chills, fevers, sweats  Respiratory:  negative for cough, dyspnea on exertion, shortness of breath, wheezing  Cardiovascular:  negative for chest pain, chest pressure/discomfort, lower extremity edema, palpitations  Gastrointestinal:  negative for abdominal pain, constipation, diarrhea, nausea, vomiting  Neurological:  negative for dizziness, headache  + Pain and small area of gangrene left great toe  Medications: Allergies:     Allergies   Allergen Reactions    Latex Rash    Demerol Hcl [Meperidine] Nausea And Vomiting       Current Meds:   Scheduled Meds:    insulin lispro  0-6 Units SubCUTAneous TID WC    insulin lispro  0-3 Units SubCUTAneous Nightly    amLODIPine  10 mg Oral Daily    buPROPion  150 mg Oral QAM    metoprolol tartrate  12.5 mg Oral BID    sodium chloride flush  5-40 mL IntraVENous 2 times per day     Continuous Infusions:    dextrose      sodium chloride      heparin (PORCINE) Infusion 22 Units/kg/hr (04/12/22 0854)     PRN Meds: glucose, dextrose, glucagon (rDNA), dextrose, albuterol, sodium chloride flush, sodium chloride, ondansetron **OR** ondansetron, acetaminophen **OR** acetaminophen, polyethylene glycol, heparin (porcine), heparin (porcine), morphine    Data:     Past Medical History:   has a past medical history of HTN (hypertension) and Type 1 diabetes mellitus ([address] Social History:   reports that she quit smoking about 3 months ago. She quit after 25.00 years of use. She has never used smokeless tobacco. She reports current alcohol use. She reports that she does not use drugs. Family History:   Family History   Problem Relation Age of Onset    Other Mother     Other Father     Diabetes Sister        Vitals:  BP 90/62   Pulse 60   Temp 98.1 °F (36.7 °C) (Oral)   Resp 16   Wt 108 lb 0.4 oz (49 kg)   SpO2 97%   Temp (24hrs), Av °F (36.7 °C), Min:97.7 °F (36.5 °C), Max:98.1 °F (36.7 °C)    Recent Labs     22  0736 22  1153   POCGLU 167* 77 180* 153*       I/O (24Hr):   No intake or output data in the 24 hours ending 22 1228    Labs:  Hematology:  Recent Labs     22  1804 22  18122  0816   WBC  --  6.7 6.2   RBC  --  4.11 3.76*   HGB  --  13.4 12.1   HCT  --  38.9 37.5   MCV  --  94.6 99.7   MCH  --  32.6 32.2   MCHC  --  34.4 32.3   RDW  --  13.2 13.8   PLT  --  399 355   MPV  --  9.7 9.8   INR 1.2  --   --      Chemistry:  Recent Labs     22  18122  0816    132*   K 3.9 4.1    102   CO2 24 21   GLUCOSE 133* 147*   BUN 6 10   CREATININE 0.77 0.90   MG  --  1.9   ANIONGAP 13 9   LABGLOM >60 >60   GFRAA >60 >60   CALCIUM 9.4 8.8   PHOS  --  4.6*     Recent Labs     22  0125 22  0128 22  0736 22  0816 22  1153   LABALBU  --   --   --  3.3*  --    POCGLU 167* 77 180*  --  153*     ABG:No results found for: POCPH, PHART, PH, POCPCO2, ASO2YTM, PCO2, POCPO2, PO2ART, PO2, POCHCO3, OJW9PKC, HCO3, NBEA, PBEA, BEART, BE, THGBART, THB, UWC3EJK, XYWU9XBN, U6WGHVMQ, O2SAT, FIO2  No results found for: SPECIAL  No results found for: CULTURE    Radiology:  XR CHEST (SINGLE VIEW FRONTAL)    Result Date: 2022  No evidence of acute cardiopulmonary disease. CTA ABDOMINAL AORTA W BILAT RUNOFF W CONTRAST    Result Date: 4/11/2022  1. Occlusion of the distal left SFA at the level of the stent. The popliteal artery is reconstituted and patent 3 vessel runoff is demonstrated. 2.  Patent right lower extremity runoff. Scattered atheromatous plaque as above. 3.  No aneurysm, dissection or acute aortic abnormality. 4.  Diverticulosis. Physical Examination:        General appearance:  alert, cooperative and mild distress due to pain left great toe  Mental Status:  oriented to person, place and time and normal affect  Lungs:  clear to auscultation bilaterally, normal effort  Heart:  regular rate and rhythm, no murmur  Abdomen:  soft, nontender, nondistended, normal bowel sounds, no masses, hepatomegaly, splenomegaly  Extremities:  + Small area of gangrene and redness around left great toe   skin:  no gross lesions, rashes, induration    Assessment:        Hospital Problems           Last Modified POA    Peripheral arterial disease ([address] 4/11/2022 Yes    Gangrene ([address] 4/11/2022 Yes    Primary hypertension 4/11/2022 Yes    Type 1 diabetes mellitus ([address] 4/11/2022 Yes          Plan:        1. Vascular planning femoropopliteal bypass today  2. Manage DM: Patient may continue to use own insulin pump. Carb controlled diet with carb counting for dosing. BS ac/hs. 3. Manage HTN. Continue home meds. 4. Pain control  5. Trend kidney function. Replete electrolytes as needed. 6. DVT prophylaxis: Currently already on heparin drip  7.  GI prophylaxis      Mechelle Matthews MD  4/12/2022  12:28 PM

## 2022-04-12 NOTE — PROGRESS NOTES
Patient admitted, consent signed and questions answered. Patient ready for procedure. Call light to reach with side rails up 2 of 2. Bilat groin hairs clipped. Family at bedside with patient. History and physical complete.   Insulin pump on and suspended by patient

## 2022-04-12 NOTE — ANESTHESIA PRE PROCEDURE
Department of Anesthesiology  Preprocedure Note       Name:  Emmy Parmar   Age:  49 y.o.  :  1965                                          MRN:  [id number]         Date:  2022      Surgeon: Samia Mott):  Tez Hogan MD    Procedure: Procedure(s): FEMORAL POPLITEAL BYPASS    Medications prior to admission:   Prior to Admission medications    Medication Sig Start Date End Date Taking?  Authorizing Provider   albuterol (ACCUNEB) 0.63 MG/3ML nebulizer solution Take 1 ampule by nebulization every 4 hours as needed for Wheezing    Historical Provider, MD   amLODIPine (NORVASC) 10 MG tablet Take 10 mg by mouth daily    Historical Provider, MD   metoprolol tartrate (LOPRESSOR) 25 MG tablet Take 12.5 mg by mouth 2 times daily    Historical Provider, MD   Cholecalciferol (VITAMIN D3) 50 MCG (2000) CAPS Take 2,000 Units by mouth daily    Historical Provider, MD   Insulin Pump Accessories MISC by Does not apply route    Historical Provider, MD   buPROPion (WELLBUTRIN XL) 150 MG extended release tablet Take 150 mg by mouth every morning    Historical Provider, MD   fenofibrate (TRICOR) 145 MG tablet Take 145 mg by mouth daily    Historical Provider, MD   acetaminophen (TYLENOL) 325 MG tablet Take 1 tablet by mouth every 6 hours as needed for Pain 4/3/22 4/10/22  Edson Shepherd MD   ibuprofen (IBU) 400 MG tablet Take 1 tablet by mouth every 6 hours as needed for Pain 4/3/22   Edson Shepherd MD       Current medications:    Current Facility-Administered Medications   Medication Dose Route Frequency Provider Last Rate Last Admin    glucose (GLUTOSE) 40 % oral gel 15 g  15 g Oral PRN MERCED Shirley CNP        dextrose 50 % IV solution  12.5 g IntraVENous PRN MERCED Shirley CNP        glucagon (rDNA) injection 1 mg  1 mg IntraMUSCular PRN MERCED Shirley CNP        dextrose 5 % solution  100 mL/hr IntraVENous PRN MERCED Shirley CNP        albuterol (PROVENTIL) nebulizer solution 2.5 mg  2.5 mg Nebulization 4x Daily PRN Mary Scarce, DO        amLODIPine (NORVASC) tablet 10 mg  10 mg Oral Daily Mary Scarce, DO        buPROPion (WELLBUTRIN XL) extended release tablet 150 mg  150 mg Oral QAM Mary Scarce, DO        metoprolol tartrate (LOPRESSOR) tablet 12.5 mg  12.5 mg Oral BID Mary Scarce, DO   12.5 mg at 04/11/22 2053    sodium chloride flush 0.9 % injection 5-40 mL  5-40 mL IntraVENous 2 times per day Mary Scarce, DO   10 mL at 04/11/22 2040    sodium chloride flush 0.9 % injection 5-40 mL  5-40 mL IntraVENous PRN Mary Scarce, DO        0.9 % sodium chloride infusion   IntraVENous PRN Mary Scarce, DO        ondansetron (ZOFRAN-ODT) disintegrating tablet 4 mg  4 mg Oral Q8H PRN Mary Scarce, DO        Or    [facility]dansetron [facility]) injection 4 mg  4 mg IntraVENous Q6H PRN Mary Scarce, DO   4 mg at 04/11/22 2343    acetaminophen (TYLENOL) tablet 650 mg  650 mg Oral Q6H PRN Mary Scarce, DO        Or    acetaminophen (TYLENOL) suppository 650 mg  650 mg Rectal Q6H PRN Mary Scarce, DO        polyethylene glycol (GLYCOLAX) packet 17 g  17 g Oral Daily PRN Mary Scarce, DO        heparin (porcine) injection 3,920 Units  80 Units/kg IntraVENous PRN Jacquie Renetta, DO        heparin (porcine) injection 1,960 Units  40 Units/kg IntraVENous PRN Jacquie Renetta, DO   1,960 Units at 04/12/22 0029    heparin 25,000 units in 0.9% sodium chloride 250 mL infusion  5-30 Units/kg/hr IntraVENous Continuous Jacquie Renetta, DO 9.8 mL/hr at 04/12/22 0029 20 Units/kg/hr at 04/12/22 0029    morphine injection 4 mg  4 mg IntraVENous Q3H PRN Mary Scarce, DO   4 mg at 04/12/22 0540       Allergies:     Allergies   Allergen Reactions    Latex Rash    Demerol Hcl [Meperidine] Nausea And Vomiting       Problem List:    Patient Active Problem List   Diagnosis Code    Peripheral arterial disease (HCC) I73.9    Gangrene ([facility] I96    Primary hypertension I10    Type 1 diabetes mellitus ([facility] E10.9       Past Medical History:        Diagnosis Date    HTN (hypertension)     Type 1 diabetes mellitus ([facility]        Past Surgical History:        Procedure Laterality Date    APPENDECTOMY       SECTION      COCCYX REMOVAL     Hysterectomy      Social History:    Social History     Tobacco Use    Smoking status: Former Smoker     Years: 25.00     Quit date: 2021     Years since quittin.2    Smokeless tobacco: Never Used   Substance Use Topics    Alcohol use: Yes     Comment: \"Couple times a week\"                                Counseling given: Not Answered      Vital Signs (Current):   Vitals:    22 1500 223 22 2304   BP: (!) 135/93 114/71 123/71   Pulse: 76 68 66   Resp: 16 18 18   Temp: 98.1 °F (36.7 °C) 97.9 °F (36.6 °C) 97.7 °F (36.5 °C)   TempSrc: Oral Oral Oral   SpO2: 99% 97% 96%   Weight: 108 lb 0.4 oz (49 kg)                                                BP Readings from Last 3 Encounters:   22 123/71   22 (!) 178/108       NPO Status:                                                                                 BMI:   Wt Readings from Last 3 Encounters:   22 108 lb 0.4 oz (49 kg)     There is no height or weight on file to calculate BMI.    CBC:   Lab Results   Component Value Date    WBC 6.7 2022    RBC 4.11 2022    HGB 13.4 2022    HCT 38.9 2022    MCV 94.6 2022    RDW 13.2 2022     2022       CMP:   Lab Results   Component Value Date     2022    K 3.9 2022     2022    CO2 24 2022    BUN 6 2022    CREATININE 0.77 2022    GFRAA >60 2022    LABGLOM >60 2022    GLUCOSE 133 2022    CALCIUM 9.4 2022       POC Tests:   Recent Labs     22  0736   POCGLU 180*       Coags:   Lab Results   Component Value Date    PROTIME 12.9 2022    INR 1.2 2022    APTT 49.8 2022       HCG (If Applicable):  No results found for: PREGTESTUR, PREGSERUM, HCG, HCGQUANT     ABGs: No results found for: PHART, PO2ART, GHP6YER, QZA4CYG, BEART, S9YLKNMT     Type & Screen (If Applicable):  No results found for: LABABO, LABRH    Drug/Infectious Status (If Applicable):  No results found for: HIV, HEPCAB    COVID-19 Screening (If Applicable): No results found for: COVID19    PT    Had Hysterectomy      Anesthesia Evaluation    Airway:         Dental:          Pulmonary:                              Cardiovascular:    (+) hypertension:,                   Neuro/Psych:               GI/Hepatic/Renal:             Endo/Other:    (+) DiabetesType I DM, using insulin, . Abdominal:             Vascular: Other Findings:             Anesthesia Plan      general     ASA 4     (Reviewed all available relevant information, laboratory results and diagnostic tests. Discussed risks , benefits and alternatives of anesthetic and sedation options. Possible need  for blood transfusions discussed. Pt given opportunity to ask questions. All questions answered.  )  Induction: intravenous. arterial line  MIPS: Postoperative opioids intended and Prophylactic antiemetics administered. Anesthetic plan and risks discussed with patient. Use of blood products discussed with patient whom. Plan discussed with CRNA.     Attending anesthesiologist reviewed and agrees with Gabriela De Jesus MD   4/12/2022

## 2022-04-12 NOTE — PLAN OF CARE
Problem: Safety:  Goal: Free from accidental physical injury  Description: Free from accidental physical injury  4/12/2022 1153 by Yaa Us RN  Outcome: Ongoing  4/12/2022 1145 by Yaa Us RN  Outcome: Ongoing  Goal: Free from intentional harm  Description: Free from intentional harm  4/12/2022 1153 by Yaa Us RN  Outcome: Ongoing  4/12/2022 1145 by Yaa Us RN  Outcome: Ongoing     Problem: Daily Care:  Goal: Daily care needs are met  Description: Daily care needs are met  4/12/2022 1153 by Yaa Us RN  Outcome: Ongoing  4/12/2022 1145 by Yaa Us RN  Outcome: Ongoing     Problem: Pain:  Goal: Patient's pain/discomfort is manageable  Description: Patient's pain/discomfort is manageable  4/12/2022 1153 by Yaa Us RN  Outcome: Ongoing  4/12/2022 1145 by Yaa Us RN  Outcome: Ongoing     Problem: Skin Integrity:  Goal: Skin integrity will stabilize  Description: Skin integrity will stabilize  4/12/2022 1153 by Yaa Us RN  Outcome: Ongoing  4/12/2022 1145 by Yaa Us RN  Outcome: Ongoing     Problem: Discharge Planning:  Goal: Patients continuum of care needs are met  Description: Patients continuum of care needs are met  4/12/2022 1153 by Yaa Us RN  Outcome: Ongoing  4/12/2022 1145 by Yaa Us RN  Outcome: Ongoing

## 2022-04-12 NOTE — PROGRESS NOTES
Vascular Surgery   Progress Note    4/12/2022 7:32 AM  Subjective:   Admit Date: 4/11/2022  PCP: Noé Khanna  Interval History:   Seen and examined at bedside this AM, no acute events overnight. Continues to endorse left foot and toe pain. Remains on hep gtt. NPO for procedure. Diet: Diet NPO Exceptions are: Sips of Water with Meds    Medications:   Scheduled Meds:   amLODIPine  10 mg Oral Daily    buPROPion  150 mg Oral QAM    metoprolol tartrate  12.5 mg Oral BID    sodium chloride flush  5-40 mL IntraVENous 2 times per day     Continuous Infusions:   dextrose      sodium chloride      heparin (PORCINE) Infusion 20 Units/kg/hr (04/12/22 0029)         Labs:   CBC:   Recent Labs     04/11/22  1810   WBC 6.7   HGB 13.4        BMP:    Recent Labs     04/11/22  1810      K 3.9      CO2 24   BUN 6   CREATININE 0.77   GLUCOSE 133*     Hepatic: No results for input(s): AST, ALT, ALB, BILITOT, ALKPHOS in the last 72 hours. Troponin: Invalid input(s): TROPONIN  BNP: No results for input(s): BNP in the last 72 hours. Lipids: No results for input(s): CHOL, HDL in the last 72 hours.     Invalid input(s): LDLCALCU  INR:   Recent Labs     04/11/22  1804   INR 1.2       Objective:   Vitals: /71   Pulse 66   Temp 97.7 °F (36.5 °C) (Oral)   Resp 18   Wt 108 lb 0.4 oz (49 kg)   SpO2 96%   CONSTITUTIONAL:  Alert and oriented, no acute distress  HEAD: normocephalic, atraumatic  EYES: Pupils equal and reactive to light, Extraocular muscles intact, sclera non icteric  ENT: Mucus membranes moist, No otorrhea, no rhinorrhea  NECK:  supple, symmetrical, trachea midline   LUNGS:  Good air movement bilaterally, unlabored respirations  CARDIOVASCULAR: Regular rate and rhythm  ABDOMEN: soft, non tender, non distended, no rebound or guarding  : Deferred  Rectal:  Deferredd  MUSCULOSKELETAL:  Equal strength bilaterally, normal muscle tone, severe pain with palpation of left toe  SKIN: Dark discoloration to the tip of her left toe  Ext: Palpable signals on right DP/PT and left DP, Dopplerable signals on left PT  NEUROLOGIC:  Cranial nerves 2-12 grossly intact, no focal deficits  PSYCH: affect appropriate    Assessment:     1. Patient is a 72-year-old female with peripheral artery disease and gangrene of left great toe  1. Patient Active Problem List:     Peripheral arterial disease ([facility]     Gangrene ([facility]     Primary hypertension     Type 1 diabetes mellitus ([facility]      Plan:   1. Cont NPO  2. CTA reviewed  3. Cont hep gtt  4.  Plan for angio this afternoon    Electronically signed by Jannet Dominguez DO on 4/12/2022 at 7:32 AM

## 2022-04-12 NOTE — PROGRESS NOTES
Comprehensive Nutrition Assessment    Type and Reason for Visit:  Initial,Positive Nutrition Screen (Weight Loss)    Nutrition Recommendations/Plan: Continue NPO. Monitor for start of oral diet and provide ONS with meals as able. Monitor labs, weights, and plan of care. Nutrition Assessment:  Chart reviewed due to positive nutrition screen for weight loss and poor intakes/appetite. Pt admitted with pain/gangrene to Left great toe. PMH includes: type 1 DM, HTN, PVD. Pt NPO and off unit x multiple attempted visits. Weight loss reported but no weight history available per chart review. Will monitor for start of oral diet and follow-up to complete full assessment. Labs reviewed: Na 132 mmol/L, Phos 4.6 mg/dL, HgbA1c 7.4%. Malnutrition Assessment:  Malnutrition Status:  Insufficient data    Context:  Chronic Illness     Findings of the 6 clinical characteristics of malnutrition:  Energy Intake:  Mild decrease in energy intake - Predicted h/o poor PO intakes. Weight Loss:  Unable to assess - Reported weight loss but minimal/no weight history per chart review. Body Fat Loss:  Unable to assess   Muscle Mass Loss:  Unable to assess  Fluid Accumulation:  No significant fluid accumulation    Strength:  Not Performed    Estimated Daily Nutrient Needs:  Energy (kcal):  28-32 kcal/kg = 4670-9687 kcals/day; Weight Used for Energy Requirements:  Current     Protein (g):  1.5 gm/kg = 75 gm pro/day; Weight Used for Protein Requirements:  Admission        Fluid (ml/day):  30 mL/kg = 1500 mL/day or per MD; Method Used for Fluid Requirements:  ml/Kg      Nutrition Related Findings:  Labs/Meds reviewed. Hypoactive bowel sounds. C/o constipation.       Wounds:   (Gangrene to left great toe)       Current Nutrition Therapies:    Diet NPO Exceptions are: Sips of Water with Meds    Anthropometric Measures:  · Height: 5' 4\" (162.6 cm)  · Current Body Weight: 108 lb 0.4 oz (49 kg)   · Admission Body Weight: 108 lb

## 2022-04-12 NOTE — PROGRESS NOTES
Evaluated post procedure she is having increased pain in her left foot. Left foot is currently more pale than right foot and currently does not have any doppler signals on left PT/DP. Suspect vasospasm at this time. Recommend continuing heparin drip, karo hugger to lower extremities, nitropaste to left great toe, neurovascular checks every 2 hours. Will also order a TEG and CBC.     Constance Richmond, DO PGY 3  General Surgery Resident  04/12/22 5:47 PM

## 2022-04-12 NOTE — PROGRESS NOTES
Ptt was drawn right after increase in dose to heparin and following Heparin bolus.  Next PTT due at 1400

## 2022-04-12 NOTE — PLAN OF CARE
Problem: Falls - Risk of:  Goal: Will remain free from falls  Description: Will remain free from falls  4/12/2022 1144 by Shailesh Dotson RN  Outcome: Ongoing  4/12/2022 0710 by Sam Koenig RN  Outcome: Ongoing  Goal: Absence of physical injury  Description: Absence of physical injury  4/12/2022 1144 by Shailesh Dotson RN  Outcome: Ongoing  4/12/2022 0710 by Sam Koenig RN  Outcome: Ongoing

## 2022-04-13 ENCOUNTER — APPOINTMENT (OUTPATIENT)
Dept: GENERAL RADIOLOGY | Age: 57
DRG: 181 | End: 2022-04-13
Attending: INTERNAL MEDICINE
Payer: COMMERCIAL

## 2022-04-13 LAB
ALBUMIN SERPL-MCNC: 3.9 G/DL (ref 3.5–5.2)
ANION GAP SERPL CALCULATED.3IONS-SCNC: 12 MMOL/L (ref 9–17)
BUN BLDV-MCNC: 8 MG/DL (ref 6–20)
CALCIUM SERPL-MCNC: 9.2 MG/DL (ref 8.6–10.4)
CHLORIDE BLD-SCNC: 103 MMOL/L (ref 98–107)
CO2: 24 MMOL/L (ref 20–31)
CREAT SERPL-MCNC: 0.92 MG/DL (ref 0.5–0.9)
EKG ATRIAL RATE: 95 BPM
EKG P AXIS: 76 DEGREES
EKG P-R INTERVAL: 146 MS
EKG Q-T INTERVAL: 360 MS
EKG QRS DURATION: 74 MS
EKG QTC CALCULATION (BAZETT): 452 MS
EKG R AXIS: 71 DEGREES
EKG T AXIS: 67 DEGREES
EKG VENTRICULAR RATE: 95 BPM
GFR AFRICAN AMERICAN: >60 ML/MIN
GFR NON-AFRICAN AMERICAN: >60 ML/MIN
GFR SERPL CREATININE-BSD FRML MDRD: ABNORMAL ML/MIN/{1.73_M2}
GLUCOSE BLD-MCNC: 150 MG/DL (ref 65–105)
GLUCOSE BLD-MCNC: 75 MG/DL (ref 70–99)
GLUCOSE BLD-MCNC: 92 MG/DL (ref 65–105)
HCT VFR BLD CALC: 35.7 % (ref 36.3–47.1)
HEMOGLOBIN: 11.6 G/DL (ref 11.9–15.1)
MAGNESIUM: 1.7 MG/DL (ref 1.6–2.6)
MCH RBC QN AUTO: 31.4 PG (ref 25.2–33.5)
MCHC RBC AUTO-ENTMCNC: 32.5 G/DL (ref 28.4–34.8)
MCV RBC AUTO: 96.7 FL (ref 82.6–102.9)
NRBC AUTOMATED: 0 PER 100 WBC
PARTIAL THROMBOPLASTIN TIME: 48.8 SEC (ref 20.5–30.5)
PARTIAL THROMBOPLASTIN TIME: 58.4 SEC (ref 20.5–30.5)
PDW BLD-RTO: 13.7 % (ref 11.8–14.4)
PHOSPHORUS: 4.3 MG/DL (ref 2.6–4.5)
PLATELET # BLD: 318 K/UL (ref 138–453)
PMV BLD AUTO: 9.6 FL (ref 8.1–13.5)
POTASSIUM SERPL-SCNC: 4.5 MMOL/L (ref 3.7–5.3)
RBC # BLD: 3.69 M/UL (ref 3.95–5.11)
SODIUM BLD-SCNC: 139 MMOL/L (ref 135–144)
WBC # BLD: 7.5 K/UL (ref 3.5–11.3)

## 2022-04-13 PROCEDURE — 85730 THROMBOPLASTIN TIME PARTIAL: CPT

## 2022-04-13 PROCEDURE — 93010 ELECTROCARDIOGRAM REPORT: CPT | Performed by: INTERNAL MEDICINE

## 2022-04-13 PROCEDURE — 36415 COLL VENOUS BLD VENIPUNCTURE: CPT

## 2022-04-13 PROCEDURE — 6370000000 HC RX 637 (ALT 250 FOR IP): Performed by: STUDENT IN AN ORGANIZED HEALTH CARE EDUCATION/TRAINING PROGRAM

## 2022-04-13 PROCEDURE — 2580000003 HC RX 258: Performed by: STUDENT IN AN ORGANIZED HEALTH CARE EDUCATION/TRAINING PROGRAM

## 2022-04-13 PROCEDURE — 7100000010 HC PHASE II RECOVERY - FIRST 15 MIN

## 2022-04-13 PROCEDURE — 73630 X-RAY EXAM OF FOOT: CPT

## 2022-04-13 PROCEDURE — 99232 SBSQ HOSP IP/OBS MODERATE 35: CPT | Performed by: INTERNAL MEDICINE

## 2022-04-13 PROCEDURE — 83735 ASSAY OF MAGNESIUM: CPT

## 2022-04-13 PROCEDURE — 85027 COMPLETE CBC AUTOMATED: CPT

## 2022-04-13 PROCEDURE — 82947 ASSAY GLUCOSE BLOOD QUANT: CPT

## 2022-04-13 PROCEDURE — 2060000000 HC ICU INTERMEDIATE R&B

## 2022-04-13 PROCEDURE — 6360000002 HC RX W HCPCS: Performed by: STUDENT IN AN ORGANIZED HEALTH CARE EDUCATION/TRAINING PROGRAM

## 2022-04-13 PROCEDURE — 80069 RENAL FUNCTION PANEL: CPT

## 2022-04-13 PROCEDURE — 7100000011 HC PHASE II RECOVERY - ADDTL 15 MIN

## 2022-04-13 RX ADMIN — METOPROLOL TARTRATE 12.5 MG: 25 TABLET ORAL at 09:55

## 2022-04-13 RX ADMIN — GABAPENTIN 300 MG: 300 CAPSULE ORAL at 06:16

## 2022-04-13 RX ADMIN — GABAPENTIN 300 MG: 300 CAPSULE ORAL at 20:46

## 2022-04-13 RX ADMIN — METOPROLOL TARTRATE 12.5 MG: 25 TABLET ORAL at 20:46

## 2022-04-13 RX ADMIN — HEPARIN SODIUM 18 UNITS/KG/HR: 5000 INJECTION, SOLUTION INTRAVENOUS; SUBCUTANEOUS at 00:52

## 2022-04-13 RX ADMIN — NITROGLYCERIN 0.5 INCH: 20 OINTMENT TOPICAL at 00:47

## 2022-04-13 RX ADMIN — HEPARIN SODIUM 1960 UNITS: 1000 INJECTION INTRAVENOUS; SUBCUTANEOUS at 07:44

## 2022-04-13 RX ADMIN — ASPIRIN 81 MG: 81 TABLET, CHEWABLE ORAL at 09:55

## 2022-04-13 RX ADMIN — OXYCODONE 5 MG: 5 TABLET ORAL at 13:13

## 2022-04-13 RX ADMIN — OXYCODONE 5 MG: 5 TABLET ORAL at 08:53

## 2022-04-13 RX ADMIN — HEPARIN SODIUM 1960 UNITS: 1000 INJECTION INTRAVENOUS; SUBCUTANEOUS at 17:50

## 2022-04-13 RX ADMIN — NITROGLYCERIN 0.5 INCH: 20 OINTMENT TOPICAL at 06:16

## 2022-04-13 RX ADMIN — OXYCODONE 5 MG: 5 TABLET ORAL at 18:33

## 2022-04-13 RX ADMIN — CILOSTAZOL 100 MG: 100 TABLET ORAL at 20:46

## 2022-04-13 RX ADMIN — SODIUM CHLORIDE, PRESERVATIVE FREE 10 ML: 5 INJECTION INTRAVENOUS at 08:54

## 2022-04-13 RX ADMIN — GABAPENTIN 300 MG: 300 CAPSULE ORAL at 13:15

## 2022-04-13 RX ADMIN — BUPROPION HYDROCHLORIDE 150 MG: 150 TABLET, EXTENDED RELEASE ORAL at 09:55

## 2022-04-13 RX ADMIN — CILOSTAZOL 100 MG: 100 TABLET ORAL at 09:55

## 2022-04-13 RX ADMIN — NIFEDIPINE 30 MG: 30 TABLET, FILM COATED, EXTENDED RELEASE ORAL at 09:55

## 2022-04-13 ASSESSMENT — PAIN SCALES - GENERAL
PAINLEVEL_OUTOF10: 10
PAINLEVEL_OUTOF10: 9
PAINLEVEL_OUTOF10: 10

## 2022-04-13 ASSESSMENT — ENCOUNTER SYMPTOMS
VOMITING: 0
NAUSEA: 0
CONSTIPATION: 0
ABDOMINAL PAIN: 0
COLOR CHANGE: 1
SHORTNESS OF BREATH: 0
DIARRHEA: 0

## 2022-04-13 ASSESSMENT — PAIN DESCRIPTION - PAIN TYPE
TYPE: CHRONIC PAIN
TYPE: CHRONIC PAIN

## 2022-04-13 ASSESSMENT — PAIN DESCRIPTION - LOCATION: LOCATION: FOOT

## 2022-04-13 ASSESSMENT — PAIN DESCRIPTION - ORIENTATION: ORIENTATION: LEFT

## 2022-04-13 NOTE — PROGRESS NOTES
[facility]  Office: [address], DO, Karina Luc, DO, Dev Watters, DO, Tammi Huynh Blood, DO, dEuar Rawls MD, Faye Leahy MD, Harriet No MD, Marisa Juares MD, Beryle Marseille, MD, Ciro Pallas, MD, Yuval Long MD, Chin Flight, DO, Airam Carrion, DO, Colin Umana MD,  Zohaib Chase, DO, Mecca Hooker MD, Jazmin Tai MD, Cielo Harris MD, August Garcia DO, Danielle Pablo MD, Catherine Freedman MD, Pierrejessica Carrillo, [facility], [facility], CNP, Herbert Velarde, CNP, Mary Preciado, CNP, Dexter Jama, CNP, Giovanni Evans, CNP, DARY RizzoC, Mortimer Guardian, [facility], Cole Mejia, CNP, Hernando Capone, CNP, Michael Cannon, CNP, Solitario Samano, CNS, Lavonne Frankel, [facility], Rola Rhodes, CNP, Roe Hernandez, CNP, Mindy Potts, [facility]         [place] [address]    Progress Note    4/13/2022    1:42 PM    Name:   Yeison Cole  MRN:     [id number]     Acct:      [de-identified]   Room:   [address] [place] [address] Day:  2  Admit Date:  4/11/2022  2:59 PM    PCP:   Lenka Dunn  Code Status:  Full Code    Subjective:     C/C: Pain left great toe    Interval History Status:   Patient had  LLE angiogram, pharmacomechanical thrombectomy, angioplasty and stenting yesterday 4/12/2022  Was complaining of pain in left foot got IV pain medicines and Nitropaste on left great toe  Still has pain but is improving  Blood sugars , currently on insulin sliding scale  Has insulin pump in place, states has DM 1  Brief History:   Yeison Cole is a 49 y.o. Non- / non  female who presents with No chief complaint on file. and is admitted to the hospital for the management of <principal problem not specified>.     Laura Canela is a 49year old female transferred from vascular surgeon office for direct admission for pain and gangrene to the left great toe.  She states that she has been seen over the past several months on several occasions for pain and vascular disease to the left leg. She was admitted initially at [facility] and had a left femoral stent placed in March. Following discharge she was found to have clotted the stent and was re-admitted. She continued to have pain after discharge and had her sister drive her to [facility] ED where she had a vascular consult and established care with vascular MD. She went to office appointment today where she states they were unable to find pulses to her left foot. She was transferred for vascular intervention. She states that she has been unable to walk on the foot and pain has been unbearable requiring narcotic pain relief. In addition she has DM1 and HTN. She states she is compliant with home medications and has an insulin pump for DM management. Review of Systems:     Constitutional:  negative for chills, fevers, sweats  Respiratory:  negative for cough, dyspnea on exertion, shortness of breath, wheezing  Cardiovascular:  negative for chest pain, chest pressure/discomfort, lower extremity edema, palpitations  Gastrointestinal:  negative for abdominal pain, constipation, diarrhea, nausea, vomiting  Neurological:  negative for dizziness, headache  + Pain and small area of gangrene left great toe  Medications: Allergies:     Allergies   Allergen Reactions    Latex Rash    Demerol Hcl [Meperidine] Nausea And Vomiting       Current Meds:   Scheduled Meds:    scopolamine  1 patch TransDERmal Once    insulin lispro  0-6 Units SubCUTAneous TID WC    insulin lispro  0-3 Units SubCUTAneous Nightly    sodium chloride flush  5-40 mL IntraVENous 2 times per day    NIFEdipine  30 mg Oral Daily    nitroglycerin  0.5 inch Topical 4 times per day    cilostazol  100 mg Oral BID    aspirin  81 mg Oral Daily    gabapentin  300 mg Oral 3 times per day    buPROPion  150 mg Oral QAM    metoprolol tartrate  12.5 mg Oral BID    sodium chloride flush  5-40 mL IntraVENous 2 times per day Continuous Infusions:    dextrose      sodium chloride      HYDROmorphone      sodium chloride      heparin (PORCINE) Infusion 20 Units/kg/hr (22 0745)     PRN Meds: glucose, dextrose, glucagon (rDNA), dextrose, sodium chloride flush, sodium chloride, oxyCODONE, naloxone, albuterol, sodium chloride flush, sodium chloride, ondansetron **OR** ondansetron, acetaminophen **OR** acetaminophen, polyethylene glycol, heparin (porcine), heparin (porcine)    Data:     Past Medical History:   has a past medical history of Former smoker, Gangrene of toe ([facility] [address] [facility] HTN (hypertension), [facility] and Type 1 diabetes mellitus ([facility] Social History:   reports that she quit smoking about 3 months ago. She quit after 25.00 years of use. She has never used smokeless tobacco. She reports current alcohol use. She reports that she does not use drugs. Family History:   Family History   Problem Relation Age of Onset    Other Mother     Other Father     Diabetes Sister        Vitals:  /67   Pulse 77   Temp 97.9 °F (36.6 °C) (Oral)   Resp 19   Ht 5' 4\" (1.626 m)   Wt 108 lb 0.4 oz (49 kg)   SpO2 98%   BMI 18.54 kg/m²   Temp (24hrs), Av.8 °F (36.6 °C), Min:97.7 °F (36.5 °C), Max:97.9 °F (36.6 °C)    Recent Labs     22  1153 22  2100 22  0817 22  1230   POCGLU 153* 162* 92 150*       I/O (24Hr):     Intake/Output Summary (Last 24 hours) at 2022 1342  Last data filed at 2022 0600  Gross per 24 hour   Intake 6.5 ml   Output --   Net 6.5 ml       Labs:  Hematology:  Recent Labs     22  1804 22  1810 22  0816 22  1827 22  0622   WBC  --    < > 6.2 11.7* 7.5   RBC  --    < > 3.76* 3.99 3.69*   HGB  --    < > 12.1 13.0 11.6*   HCT  --    < > 37.5 37.7 35.7*   MCV  --    < > 99.7 94.5 96.7   MCH  --    < > 32.2 32.6 31.4   MCHC  --    < > 32.3 34.5 32.5   RDW  --    < > 13.8 13.6 13.7   PLT  --    < > 355 318 318   MPV --    < > 9.8 9.6 9.6   INR 1.2  --   --   --   --     < > = values in this interval not displayed. Chemistry:  Recent Labs     04/11/22  1810 04/12/22  0816 04/13/22  0622    132* 139   K 3.9 4.1 4.5    102 103   CO2 24 21 24   GLUCOSE 133* 147* 75   BUN 6 10 8   CREATININE 0.77 0.90 0.92*   MG  --  1.9 1.7   ANIONGAP 13 9 12   LABGLOM >60 >60 >60   GFRAA >60 >60 >60   CALCIUM 9.4 8.8 9.2   PHOS  --  4.6* 4.3     Recent Labs     04/12/22  0128 04/12/22  0736 04/12/22  0816 04/12/22  1153 04/12/22  2100 04/13/22  0622 04/13/22  0817 04/13/22  1230   LABALBU  --   --  3.3*  --   --  3.9  --   --    LABA1C  --   --  7.4*  --   --   --   --   --    POCGLU 77 180*  --  153* 162*  --  92 150*     ABG:No results found for: POCPH, PHART, PH, POCPCO2, IZH2UKP, PCO2, POCPO2, PO2ART, PO2, POCHCO3, KKJ7LWQ, HCO3, NBEA, PBEA, BEART, BE, THGBART, THB, TXH9CFY, VHKQ6QQT, J8PGKQHK, O2SAT, FIO2  No results found for: SPECIAL  No results found for: CULTURE    Radiology:  XR CHEST (SINGLE VIEW FRONTAL)    Result Date: 4/11/2022  No evidence of acute cardiopulmonary disease. CTA ABDOMINAL AORTA W BILAT RUNOFF W CONTRAST    Result Date: 4/11/2022  1. Occlusion of the distal left SFA at the level of the stent. The popliteal artery is reconstituted and patent 3 vessel runoff is demonstrated. 2.  Patent right lower extremity runoff. Scattered atheromatous plaque as above. 3.  No aneurysm, dissection or acute aortic abnormality. 4.  Diverticulosis.        Physical Examination:        General appearance:  alert, cooperative and mild distress due to pain left great toe  Mental Status:  oriented to person, place and time and normal affect  Lungs:  clear to auscultation bilaterally, normal effort  Heart:  regular rate and rhythm, no murmur  Abdomen:  soft, nontender, nondistended, normal bowel sounds, no masses, hepatomegaly, splenomegaly  Extremities:  + Small area of gangrene and redness around left great toe   skin: no gross lesions, rashes, induration    Assessment:        Hospital Problems           Last Modified POA    Peripheral arterial disease ([facility] [address] 4/11/2022 Yes    Gangrene ([facility] [address] [facility] 4/11/2022 Yes    Primary hypertension 4/11/2022 Yes    Type 1 diabetes mellitus ([facility] [address] 4/11/2022 Yes      S/p LLE angiogram, pharmacomechanical thrombectomy, angioplasty and stenting. POD # 1    Plan:        1. Vascular recommending to continue heparin drip for today  2. Dilaudid PCA has been started by vascular for better pain control  3. Manage DM: Patient may continue to use own insulin pump. Carb controlled diet with carb counting for dosing. BS ac/hs. 4. Manage HTN. Continue home meds. 5. DVT prophylaxis: Currently already on heparin drip  6.  GI prophylaxis      Miguel Junior MD  4/13/2022  1:42 PM

## 2022-04-13 NOTE — PLAN OF CARE
Problem: Falls - Risk of:  Goal: Will remain free from falls  Description: Will remain free from falls  4/13/2022 0118 by Ashkan Ocasio RN  Outcome: Ongoing  4/12/2022 1153 by Birdena Hashimoto, RN  Outcome: Ongoing  4/12/2022 1145 by Birdena Hashimoto, RN  Outcome: Ongoing  4/12/2022 1144 by Birdena Hashimoto, RN  Outcome: Ongoing  Goal: Absence of physical injury  Description: Absence of physical injury  4/13/2022 0118 by Ashkan Ocasio RN  Outcome: Ongoing  4/12/2022 1153 by Birdena Hashimoto, RN  Outcome: Ongoing  4/12/2022 1145 by Birdena Hashimoto, RN  Outcome: Ongoing  4/12/2022 1144 by Birdena Hashimoto, RN  Outcome: Ongoing     Problem: Infection:  Goal: Will remain free from infection  Description: Will remain free from infection  4/13/2022 0118 by Ashkan Ocasio RN  Outcome: Ongoing  4/12/2022 1153 by Birdena Hashimoto, RN  Outcome: Ongoing  4/12/2022 1145 by Birdena Hashimoto, RN  Outcome: Ongoing     Problem: Safety:  Goal: Free from accidental physical injury  Description: Free from accidental physical injury  4/13/2022 0118 by Ashkan Ocasio RN  Outcome: Ongoing  4/12/2022 1153 by Birdena Hashimoto, RN  Outcome: Ongoing  4/12/2022 1145 by Birdena Hashimoto, RN  Outcome: Ongoing  Goal: Free from intentional harm  Description: Free from intentional harm  4/13/2022 0118 by Ashkan Ocasio RN  Outcome: Ongoing  4/12/2022 1153 by Birdena Hashimoto, RN  Outcome: Ongoing  4/12/2022 1145 by Birdena Hashimoto, RN  Outcome: Ongoing     Problem: Daily Care:  Goal: Daily care needs are met  Description: Daily care needs are met  4/13/2022 0118 by Ashkan Ocasio RN  Outcome: Ongoing  4/12/2022 1153 by Birdena Hashimoto, RN  Outcome: Ongoing  4/12/2022 1145 by Birdena Hashimoto, RN  Outcome: Ongoing     Problem: Pain:  Goal: Patient's pain/discomfort is manageable  Description: Patient's pain/discomfort is manageable  4/13/2022 0118 by Ashkan Ocasio RN  Outcome: Ongoing  4/12/2022 1153 by Birdena Hashimoto, RN  Outcome: Ongoing  4/12/2022 1145 by Dalphine Paget, RN  Outcome: Ongoing  Goal: Pain level will decrease  Description: Pain level will decrease  Outcome: Ongoing  Goal: Control of acute pain  Description: Control of acute pain  Outcome: Ongoing  Goal: Control of chronic pain  Description: Control of chronic pain  Outcome: Ongoing     Problem: Skin Integrity:  Goal: Skin integrity will stabilize  Description: Skin integrity will stabilize  4/13/2022 0118 by Anne Marie Enriquez RN  Outcome: Ongoing  4/12/2022 1153 by Dalphine Paget, RN  Outcome: Ongoing  4/12/2022 1145 by Dalphine Paget, RN  Outcome: Ongoing     Problem: Discharge Planning:  Goal: Patients continuum of care needs are met  Description: Patients continuum of care needs are met  4/13/2022 0118 by Anne Marie Enriquez RN  Outcome: Ongoing  4/12/2022 1153 by Dalphine Paget, RN  Outcome: Ongoing  4/12/2022 1145 by Dalphine Paget, RN  Outcome: Ongoing     Problem: Nutrition  Goal: Optimal nutrition therapy  4/13/2022 0118 by Anne Marie Enriquez RN  Outcome: Ongoing  4/12/2022 1547 by Marcela Paget, RD, LD  Outcome: Ongoing  Note: Nutrition Problem #1: Inadequate oral intake  Intervention: Food and/or Nutrient Delivery: Continue NPO (Monitor for start of oral diet and provide ONS with meals as able.)  Nutritional Goals: Meet at least 50% of estimated nutrition needs.

## 2022-04-13 NOTE — PROGRESS NOTES
Vascular Surgery Progress Note            PATIENT NAME: Alice Lu     TODAY'S DATE: 4/13/2022, 8:15 AM    SUBJECTIVE:    Pt seen and examined. Ms. Matthew Perry is POD #1 s/p LLE angiogram, pharmacomechanical thrombectomy, angioplasty and stenting. She is complaining of left foot pain overnight. It is controlled with pain meds. Denies any CP, SOB, abdominal pain, n/v, fevers or chills overnight. She has ambulated after the procedure and is tolerating a diet and liquids very well. Good UOP, using a bed pan. Has not had any bowel movements in 3 days. Patient was placed on 2L NC with SpO2 of 98% while she is sleeping. VSS. Afebrile. Morning labs reviewed. WBC 7.5, Hgb 11.6, BUN 8, Cr 0.92, Mg 1.7, PTT 48.8. RN also reports no acute events overnight. OBJECTIVE:   VITALS:  /67   Pulse 77   Temp 97.9 °F (36.6 °C) (Oral)   Resp 19   Ht 5' 4\" (1.626 m)   Wt 108 lb 0.4 oz (49 kg)   SpO2 98%   BMI 18.54 kg/m²      INTAKE/OUTPUT:      Intake/Output Summary (Last 24 hours) at 4/13/2022 0815  Last data filed at 4/13/2022 0600  Gross per 24 hour   Intake 6.5 ml   Output --   Net 6.5 ml       PHYSICAL EXAM  GEN: Alert, awake, oriented, NAD  HEENT: normocephalic, no scleral icterus, moist mucous membranes  CV: regular rate and rhythm   LUNG: no respiratory distress, no audible wheezing  ABDOMEN: soft, non-distended, non-tender   EXTREMITIES: Right DP/PT palpable and doppler signals present. Left PT doppler signals present. SKIN: Left great toe dark discoloration.      Data:  CBC with Differential:    Lab Results   Component Value Date    WBC 7.5 04/13/2022    RBC 3.69 04/13/2022    HGB 11.6 04/13/2022    HCT 35.7 04/13/2022     04/13/2022    MCV 96.7 04/13/2022    MCH 31.4 04/13/2022    MCHC 32.5 04/13/2022    RDW 13.7 04/13/2022    LYMPHOPCT 28 04/12/2022    MONOPCT 6 04/12/2022    BASOPCT 1 04/12/2022    MONOSABS 0.75 04/12/2022    LYMPHSABS 3.25 04/12/2022    EOSABS 0.22 04/12/2022    BASOSABS 0.06 04/12/2022     BMP:    Lab Results   Component Value Date     04/13/2022    K 4.5 04/13/2022     04/13/2022    CO2 24 04/13/2022    BUN 8 04/13/2022    LABALBU 3.9 04/13/2022    CREATININE 0.92 04/13/2022    CALCIUM 9.2 04/13/2022    GFRAA >60 04/13/2022    LABGLOM >60 04/13/2022    GLUCOSE 75 04/13/2022       Radiology Review:   No new radiology to review this morning. ASSESSMENT   48 yo female POD #1 s/p LLE angiogram, pharmacomechanical thrombectomy, angioplasty and stenting. Plan  1. Continue heparin ggt. 2. Continue karo hugger. 3. Continue nitro-bid 2% ointment to Left great toe  4. Started Pletal and Procardia  5. Neurovascular checks every 2 hours. 6. Pain control. 7. Encourage IS and OOB. 8. Diet: adult regular diet. 9. We will continue to follow Ms. Zoila Seal. Thank you. Joaquin Alvarez, MS3  Vascular Surgery Service  [facility]   4/13/2022 at 8:26 AM    Addendum:    Patient seen and examined with Medical Student and Surgical Team.  Agree with assessment and plan with changes made accordingly.     Keith Castillo, DO  PGY-3 General Surgery  04/13/22  8:54 AM

## 2022-04-14 LAB
ALBUMIN SERPL-MCNC: 3.6 G/DL (ref 3.5–5.2)
ANION GAP SERPL CALCULATED.3IONS-SCNC: 7 MMOL/L (ref 9–17)
BUN BLDV-MCNC: 8 MG/DL (ref 6–20)
CALCIUM SERPL-MCNC: 9 MG/DL (ref 8.6–10.4)
CHLORIDE BLD-SCNC: 100 MMOL/L (ref 98–107)
CO2: 27 MMOL/L (ref 20–31)
CREAT SERPL-MCNC: 0.85 MG/DL (ref 0.5–0.9)
GFR AFRICAN AMERICAN: >60 ML/MIN
GFR NON-AFRICAN AMERICAN: >60 ML/MIN
GFR SERPL CREATININE-BSD FRML MDRD: ABNORMAL ML/MIN/{1.73_M2}
GLUCOSE BLD-MCNC: 220 MG/DL (ref 70–99)
HCT VFR BLD CALC: 30.3 % (ref 36.3–47.1)
HEMOGLOBIN: 10.3 G/DL (ref 11.9–15.1)
MAGNESIUM: 1.6 MG/DL (ref 1.6–2.6)
MCH RBC QN AUTO: 32.6 PG (ref 25.2–33.5)
MCHC RBC AUTO-ENTMCNC: 34 G/DL (ref 28.4–34.8)
MCV RBC AUTO: 95.9 FL (ref 82.6–102.9)
NRBC AUTOMATED: 0 PER 100 WBC
PARTIAL THROMBOPLASTIN TIME: 50.9 SEC (ref 20.5–30.5)
PARTIAL THROMBOPLASTIN TIME: 70.8 SEC (ref 20.5–30.5)
PARTIAL THROMBOPLASTIN TIME: 98.4 SEC (ref 20.5–30.5)
PDW BLD-RTO: 13 % (ref 11.8–14.4)
PHOSPHORUS: 3.6 MG/DL (ref 2.6–4.5)
PLATELET # BLD: 228 K/UL (ref 138–453)
PMV BLD AUTO: 10.1 FL (ref 8.1–13.5)
POTASSIUM SERPL-SCNC: 4.2 MMOL/L (ref 3.7–5.3)
RBC # BLD: 3.16 M/UL (ref 3.95–5.11)
SODIUM BLD-SCNC: 134 MMOL/L (ref 135–144)
URIC ACID: 3.4 MG/DL (ref 2.4–5.7)
WBC # BLD: 5.9 K/UL (ref 3.5–11.3)

## 2022-04-14 PROCEDURE — 6360000002 HC RX W HCPCS: Performed by: STUDENT IN AN ORGANIZED HEALTH CARE EDUCATION/TRAINING PROGRAM

## 2022-04-14 PROCEDURE — 85730 THROMBOPLASTIN TIME PARTIAL: CPT

## 2022-04-14 PROCEDURE — 2580000003 HC RX 258: Performed by: STUDENT IN AN ORGANIZED HEALTH CARE EDUCATION/TRAINING PROGRAM

## 2022-04-14 PROCEDURE — 6370000000 HC RX 637 (ALT 250 FOR IP): Performed by: STUDENT IN AN ORGANIZED HEALTH CARE EDUCATION/TRAINING PROGRAM

## 2022-04-14 PROCEDURE — 99232 SBSQ HOSP IP/OBS MODERATE 35: CPT | Performed by: INTERNAL MEDICINE

## 2022-04-14 PROCEDURE — 2700000000 HC OXYGEN THERAPY PER DAY

## 2022-04-14 PROCEDURE — 85027 COMPLETE CBC AUTOMATED: CPT

## 2022-04-14 PROCEDURE — 83735 ASSAY OF MAGNESIUM: CPT

## 2022-04-14 PROCEDURE — 94761 N-INVAS EAR/PLS OXIMETRY MLT: CPT

## 2022-04-14 PROCEDURE — 80069 RENAL FUNCTION PANEL: CPT

## 2022-04-14 PROCEDURE — 84550 ASSAY OF BLOOD/URIC ACID: CPT

## 2022-04-14 PROCEDURE — 2060000000 HC ICU INTERMEDIATE R&B

## 2022-04-14 PROCEDURE — 6370000000 HC RX 637 (ALT 250 FOR IP): Performed by: INTERNAL MEDICINE

## 2022-04-14 PROCEDURE — 36415 COLL VENOUS BLD VENIPUNCTURE: CPT

## 2022-04-14 RX ADMIN — NITROGLYCERIN 0.5 INCH: 20 OINTMENT TOPICAL at 00:31

## 2022-04-14 RX ADMIN — BUPROPION HYDROCHLORIDE 150 MG: 150 TABLET, EXTENDED RELEASE ORAL at 09:09

## 2022-04-14 RX ADMIN — NITROGLYCERIN 0.5 INCH: 20 OINTMENT TOPICAL at 19:39

## 2022-04-14 RX ADMIN — GABAPENTIN 300 MG: 300 CAPSULE ORAL at 13:16

## 2022-04-14 RX ADMIN — APIXABAN 10 MG: 5 TABLET, FILM COATED ORAL at 16:33

## 2022-04-14 RX ADMIN — OXYCODONE 5 MG: 5 TABLET ORAL at 09:21

## 2022-04-14 RX ADMIN — GABAPENTIN 300 MG: 300 CAPSULE ORAL at 21:53

## 2022-04-14 RX ADMIN — Medication: at 17:21

## 2022-04-14 RX ADMIN — NITROGLYCERIN 0.5 INCH: 20 OINTMENT TOPICAL at 13:15

## 2022-04-14 RX ADMIN — OXYCODONE 5 MG: 5 TABLET ORAL at 04:01

## 2022-04-14 RX ADMIN — NIFEDIPINE 30 MG: 30 TABLET, FILM COATED, EXTENDED RELEASE ORAL at 09:09

## 2022-04-14 RX ADMIN — OXYCODONE 5 MG: 5 TABLET ORAL at 16:33

## 2022-04-14 RX ADMIN — GABAPENTIN 300 MG: 300 CAPSULE ORAL at 05:53

## 2022-04-14 RX ADMIN — CILOSTAZOL 100 MG: 100 TABLET ORAL at 21:53

## 2022-04-14 RX ADMIN — NITROGLYCERIN 0.5 INCH: 20 OINTMENT TOPICAL at 05:55

## 2022-04-14 RX ADMIN — CILOSTAZOL 100 MG: 100 TABLET ORAL at 09:09

## 2022-04-14 RX ADMIN — METOPROLOL TARTRATE 12.5 MG: 25 TABLET ORAL at 21:53

## 2022-04-14 RX ADMIN — OXYCODONE 5 MG: 5 TABLET ORAL at 21:53

## 2022-04-14 RX ADMIN — ASPIRIN 81 MG: 81 TABLET, CHEWABLE ORAL at 09:09

## 2022-04-14 RX ADMIN — METOPROLOL TARTRATE 12.5 MG: 25 TABLET ORAL at 09:09

## 2022-04-14 RX ADMIN — HEPARIN SODIUM 20 UNITS/KG/HR: 5000 INJECTION, SOLUTION INTRAVENOUS; SUBCUTANEOUS at 05:57

## 2022-04-14 ASSESSMENT — PAIN DESCRIPTION - PAIN TYPE: TYPE: CHRONIC PAIN

## 2022-04-14 ASSESSMENT — PAIN SCALES - GENERAL
PAINLEVEL_OUTOF10: 10
PAINLEVEL_OUTOF10: 8
PAINLEVEL_OUTOF10: 8

## 2022-04-14 NOTE — PROGRESS NOTES
Progress Note  Podiatric Medicine and Surgery     Subjective     CC:  Left hallux gangrene    Patient seen and examined at bedside. No acute events overnight. Afebrile, vital signs stable   Pain and discoloration to left foot much improved since previous exam however patient does continue to complain of persistent pain to the medial aspect the left foot. HPI :  Ginny Stuart is a 49 y.o. female seen at [facility] for worsening left hallux pain and gangrenous changes. Patient states that a couple months ago she underwent a partial nail avulsion to her left hallux by her podiatrist.  Over the past few months following procedure she noted that her left hallux continue to get purple and blue in color. Patient states that for some time she has had an issue with her circulation to her toes. She was seen by her vascular surgeon who sent her promptly to the ER for critical limb ischemia of the left foot since pulses were nonpalpable or audible on Doppler. Patient underwent a left femoral stent in March. Patient underwent a left angiogram which showed three-vessel runoff. Patient states that her right hallux is extremely painful at all times. She does note that she has a history of Raynaud's in her hands and has also noticed symptoms in her feet where her vessels going to spasm and turn white, red, and blue/purple after some time. Patient states that it affects her left foot more recently than her right. Denies any recent injuries or trauma. Patient is a type I diabetic with a recent A1c 7.4% as of 4/12/2022. ROS: Denies N/V/F/C/SOB/CP. Otherwise negative except at stated in the HPI.      Medications:  Scheduled Meds:   insulin lispro  0-18 Units SubCUTAneous TID WC    insulin lispro  0-9 Units SubCUTAneous Nightly    scopolamine  1 patch TransDERmal Once    sodium chloride flush  5-40 mL IntraVENous 2 times per day    NIFEdipine  30 mg Oral Daily    nitroglycerin  0.5 inch Topical 4 times per day    cilostazol  100 mg Oral BID    aspirin  81 mg Oral Daily    gabapentin  300 mg Oral 3 times per day    buPROPion  150 mg Oral QAM    metoprolol tartrate  12.5 mg Oral BID    sodium chloride flush  5-40 mL IntraVENous 2 times per day       Continuous Infusions:   dextrose      sodium chloride      HYDROmorphone      sodium chloride      heparin (PORCINE) Infusion 20 Units/kg/hr (22 0557)       PRN Meds:glucose, dextrose, glucagon (rDNA), dextrose, sodium chloride flush, sodium chloride, oxyCODONE, naloxone, albuterol, sodium chloride flush, sodium chloride, ondansetron **OR** ondansetron, acetaminophen **OR** acetaminophen, polyethylene glycol, heparin (porcine), heparin (porcine)    Objective     Vitals:  Patient Vitals for the past 8 hrs:   BP Temp Temp src Pulse Resp SpO2 Weight   22 0600 -- -- -- -- -- -- 113 lb (51.3 kg)   22 0400 120/76 -- -- 90 -- 99 % --   22 0345 120/76 98.2 °F (36.8 °C) Oral 80 20 98 % --     Average, Min, and Max for last 24 hours Vitals:  TEMPERATURE:  Temp  Av.8 °F (37.1 °C)  Min: 98.2 °F (36.8 °C)  Max: 99.4 °F (37.4 °C)    RESPIRATIONS RANGE: Resp  Avg: 15.7  Min: 11  Max: 20    PULSE RANGE: Pulse  Av.4  Min: 76  Max: 94    BLOOD PRESSURE RANGE:  Systolic (09VHK), ZCL:520 , Min:103 , BDR:963   ; Diastolic (94EOO), URN:06, Min:60, Max:90      PULSE OXIMETRY RANGE: SpO2  Av.4 %  Min: 94 %  Max: 100 %    I/O last 3 completed shifts: In: 450.1 [P.O.:300;  I.V.:150.1]  Out: -     CBC:  Recent Labs     22  1827 22  0622 22  0552   WBC 11.7* 7.5 5.9   HGB 13.0 11.6* 10.3*   HCT 37.7 35.7* 30.3*    318 228        BMP:  Recent Labs     22  0816 22  0622 22  0552   * 139 134*   K 4.1 4.5 4.2    103 100   CO2 21 24 27   BUN 10 8 8   CREATININE 0.90 0.92* 0.85   GLUCOSE 147* 75 220*   CALCIUM 8.8 9.2 9.0        Coags:  Recent Labs     22  1804 22  1952 22  1638 22  0014 04/14/22  0649   APTT  --    < > 58.4* 98.4* 70.8*   INR 1.2  --   --   --   --     < > = values in this interval not displayed. No results found for: SEDRATE  No results for input(s): CRP in the last 72 hours. Physical Exam:  Vascular: DP pulse on the left is weakly palpable and audible, DP on left and PT pulses bilaterally are palpable. CFT <3 seconds to all digits bilaterally except left hallux where CFT is less than 5 seconds. Hair growth is absent to the level of the digits. Nonpitting edema left foot edema. Neuro: Saph/sural/SP/DP/plantar sensation diminished to light touch. Musculoskeletal: Muscle strength is 4/5 to all lower extremity muscle groups, more guarded on the left than the right. Gross deformity is absent. Significant pain to palpation to the left hallux. Dermatologic: Dry stable eschar noted overlying the distal tuft of the left hallux. Left hallux is noted to have some pallor but overall improved since previous exam.  Periwound cyanosis is improving. No drainage noted with no associated mal odor. Erythema present from distal digits to dorsal midfoot with mild associated increase in warmth. Does not probe to bone, sinus track, or undermine. No fluctuance, crepitus, or induration. Clinical Images:                  Imaging:   XR FOOT LEFT (MIN 3 VIEWS)   Final Result   Unremarkable left foot. CTA ABDOMINAL AORTA W BILAT RUNOFF W CONTRAST   Final Result   1. Occlusion of the distal left SFA at the level of the stent. The   popliteal artery is reconstituted and patent 3 vessel runoff is demonstrated. 2.  Patent right lower extremity runoff. Scattered atheromatous plaque as   above. 3.  No aneurysm, dissection or acute aortic abnormality. 4.  Diverticulosis. XR CHEST (SINGLE VIEW FRONTAL)   Final Result   No evidence of acute cardiopulmonary disease.              Assessment   Marianne Jackson is a 49 y.o. female with   Ischemic hallux with gangrenous changes, left hallux  Suspected shower emboli, left foot  PVD  Type 1 diabetes with neuropathy  History of Raynaud's      Active Problems:    Peripheral arterial disease (HCC)    Gangrene (HCC)    Primary hypertension    Type 1 diabetes mellitus ([facility] [address]  Resolved Problems:    * No resolved hospital problems. *       Plan     Patient examined and evaluated at bedside   Treatment options discussed in detail with the patient  Radiographs of left foot  reviewed: Low suspicion for osteomyelitis or gas  Medical management per primary  Vascular following:   Heparin GTT with plan to transition to Eliquis  Maintain better however  Continue Nitropaste  Continue plan to home Procardia  Overall limb ischemia of the left foot seems to be improving. Patient's pain is persistent however it is improving. No plan for surgical intervention at this time. We will continue to follow to ensure resolution of ischemic event. Further care can be established outpatient. Dressing applied to Left foot: Betadine wet-to-dry, DSD  Weightbearing as tolerated to Left lower extremity  Discussed with Dr. July Ventura DPM   Podiatric Medicine & Surgery   4/14/2022 at 11:21 AM    I performed a history and physical examination of the patient and discussed management with the resident. I reviewed the residents note and agree with the documented findings and plan of care. Any areas of disagreement are noted on the chart. I was personally present for the key portions of any procedures. I have documented in the chart those procedures where I was not present during the key portions. I have reviewed the Podiatry Resident progress note. I agree with the chief complaint, past medical history, past surgical history, allergies, medications, social and family history as documented unless otherwise noted below.  Documentation of the HPI, Physical Exam and Medical Decision Making performed by medical students or scribes is based on my personal performance of the HPI, PE and MDM. I have personally evaluated this patient and have completed at least one if not all key elements of the E/M (history, physical exam, and MDM). Additional findings are as noted.      Jaret Fay DPM on 4/15/2022 at 0:41 AM  Board Certified, American Board of Podiatric Surgery  Fellow, [employer] [facility] and [facility]

## 2022-04-14 NOTE — PROGRESS NOTES
Comprehensive Nutrition Assessment    Type and Reason for Visit:  Reassess    Nutrition Recommendations/Plan:    - Send Glucerna protein shakes with meals.    - PO intake as tolerated. Nutrition Assessment:  Pt reports decreased PO intake PTA d/t pain, but states this is much improved in the hospital d/t adequate pain management. Eating the majority of her meals. Requesting Glucerna shakes TID. States she drinks ~2 containers daily at home. Malnutrition Assessment:  Malnutrition Status:  No malnutrition    Context:  Acute Illness     Findings of the 6 clinical characteristics of malnutrition:  Energy Intake:  Mild decrease in energy intake (Comment)  Weight Loss:  Unable to assess     Body Fat Loss:  No significant body fat loss     Muscle Mass Loss:  No significant muscle mass loss    Fluid Accumulation:  No significant fluid accumulation     Strength:  Not Performed    Estimated Daily Nutrient Needs:  Energy (kcal):  28-32 kcal/kg = 4865-3982 kcals/day; Weight Used for Energy Requirements:  Current  Protein (g):  1.5 gm/kg = 75 gm pro/day; Weight Used for Protein Requirements:  Admission        Fluid (ml/day):  30 mL/kg = 1500 mL/day or per MD; Method Used for Fluid Requirements:  ml/Kg      Nutrition Related Findings:  meds/labs reviewed      Wounds:   (Gangrene to left great toe)       Current Nutrition Therapies:    ADULT DIET; Regular; 4 carb choices (60 gm/meal)      Anthropometric Measures:  · Height: 5' 4\" (162.6 cm)  · Current Body Weight: 113 lb 1.5 oz (51.3 kg)   · Admission Body Weight: 108 lb 0.4 oz (49 kg)    · Ideal Body Weight: 120 lbs; % Ideal Body Weight 94.2 %   · BMI: 19.4  · BMI Categories: Normal Weight (BMI 18.5-24. 9)       Nutrition Diagnosis:   · Inadequate oral intake (resolving) related to pain as evidenced by poor intake prior to admission      Nutrition Interventions:   Food and/or Nutrient Delivery:  Continue Current Diet,Start Oral Nutrition Supplement  Nutrition

## 2022-04-14 NOTE — PROGRESS NOTES
Vascular Surgery Progress Note            PATIENT NAME: Karina Hay     TODAY'S DATE: 4/14/2022, 8:17 AM    SUBJECTIVE:    Pt seen and examined. Ms. Jace Calzada is POD #2 s/p LLE angiogram, pharmacomechanical thrombectomy, angioplasty and stenting. She is complaining of left toe/foot pain overnight. It is controlled with pain meds and with making lower extremity more dependent. Denies any CP, SOB, abdominal pain, n/v, fevers or chills overnight. Tolerating some PO.    VSS. Afebrile. Morning labs reviewed. WBC 7.5, Hgb 11.6, BUN 8, Cr 0.92, Mg 1.7, PTT 48.8. RN also reports no acute events overnight. OBJECTIVE:   VITALS:  /76   Pulse 90   Temp 98.2 °F (36.8 °C) (Oral)   Resp 20   Ht 5' 4\" (1.626 m)   Wt 113 lb (51.3 kg)   SpO2 99%   BMI 19.40 kg/m²      INTAKE/OUTPUT:      Intake/Output Summary (Last 24 hours) at 4/14/2022 0817  Last data filed at 4/14/2022 0400  Gross per 24 hour   Intake 443.55 ml   Output --   Net 443.55 ml       PHYSICAL EXAM  GEN: Alert, awake, oriented, NAD  HEENT: normocephalic, no scleral icterus, moist mucous membranes  CV: regular rate and rhythm   LUNG: no respiratory distress, no audible wheezing  ABDOMEN: soft, non-distended, non-tender   EXTREMITIES: Right DP/PT palpable and doppler signals present. Left PT doppler signals present. SKIN: Left great toe dark discoloration.      Data:  CBC with Differential:    Lab Results   Component Value Date    WBC 5.9 04/14/2022    RBC 3.16 04/14/2022    HGB 10.3 04/14/2022    HCT 30.3 04/14/2022     04/14/2022    MCV 95.9 04/14/2022    MCH 32.6 04/14/2022    MCHC 34.0 04/14/2022    RDW 13.0 04/14/2022    LYMPHOPCT 28 04/12/2022    MONOPCT 6 04/12/2022    BASOPCT 1 04/12/2022    MONOSABS 0.75 04/12/2022    LYMPHSABS 3.25 04/12/2022    EOSABS 0.22 04/12/2022    BASOSABS 0.06 04/12/2022     BMP:    Lab Results   Component Value Date     04/14/2022    K 4.2 04/14/2022     04/14/2022    CO2 27 04/14/2022    BUN 8 04/14/2022    LABALBU 3.6 04/14/2022    CREATININE 0.85 04/14/2022    CALCIUM 9.0 04/14/2022    GFRAA >60 04/14/2022    LABGLOM >60 04/14/2022    GLUCOSE 220 04/14/2022       Radiology Review:   No new radiology to review this morning. ASSESSMENT   48 yo female POD #2 s/p LLE angiogram, pharmacomechanical thrombectomy, angioplasty and stenting. Plan  1. Continue heparin ggt, OK to transfer back to PO anticoagulation, would recommend transition to Eliquis with the first week double dosed like the starter dose pack as if she had a DVT/PE  2. Continue karo hugger PRN  3. Continue nitro-bid 2% ointment to left great toe  4. Continue Pletal and Procardia  5. Neurovascular checks every 2 hours. 6. Recommend weaning PCA to get patient off IV pain meds  7. Encourage IS and OOB. 8. Follow up podiatry plans  9. We will continue to follow Ms. Vane Fox. Thank you.        Satya Salgado DO  PGY-3 General Surgery  04/14/22  8:17 AM

## 2022-04-14 NOTE — CARE COORDINATION
Transitional planning-talked with patient, plan is to go home with her daughter Matty Ko. Not wanting any home care at this time. Has ride.

## 2022-04-14 NOTE — PROGRESS NOTES
[facility]  Office: [address], DO, Gaurangmicah Shadow, DO, Aranza Murdock, DO, Rosanna Enrique Blood, DO, Christian Barnett MD, Froylan Mendez MD, Susu Barton MD, Regla Mcbride MD, Bowen Trivedi MD, Nataly Granados MD, Jenae Cochran MD, Isabel Jauregui, DO, Sanjay Treviño, DO, Klaus Espino MD,  Teodoro Knutson, DO, Anaid Ellis MD, Maria Elena Holland MD, Sudha Chau MD, Aaron Terrazas DO, Ann-Marie Rg MD, Crystal Corado MD, Diandra Hutton, [facility], [facility] Rita, [facility], Yareli Pastmony, CNP, Eveline San, CNP, Tomer Reese, CNP, Kristine Rajan, CNP, Riley Hough, PA-C, Roc Hall, [facility], Kalyan Cobb, CNP, Enrique Smith, CNP, Sree Flores, CNP, Juliette Weston, CNS, Rosana Ambrose, [facility], Climmiandrea Points, CNP, Jamari Boehringer, CNP, Ryland Dawson, CNP         [address]    Progress Note    4/14/2022    1:32 PM    Name:   Lemond Cranker  MRN:     [id number]     Acct:      [de-identified]   Room:   [address] [place] [address] Day:  3  Admit Date:  4/11/2022  2:59 PM    PCP:   Angy Morales  Code Status:  Full Code    Subjective:     C/C: Pain left great toe    Interval History Status:   Patient had  LLE angiogram, pharmacomechanical thrombectomy, angioplasty and stenting  4/12/2022  Still complaining of pain in left foot despite of Dilaudid PCA, oral nifedipine and Nitropaste on left great toe  Blood sugars 220, currently on low intensity insulin sliding scale  Probably has misplaced her insulin pump, states has DM 1  Brief History:   Lemond Cranker is a 49 y.o. Non- / non  female who presents with No chief complaint on file. and is admitted to the hospital for the management of <principal problem not specified>.     Milan Pruitt is a 49year old female transferred from vascular surgeon office for direct admission for pain and gangrene to the left great toe.  She states that she has been seen over the past several months on several occasions for pain and vascular disease to the left leg. She was admitted initially at [facility] and had a left femoral stent placed in March. Following discharge she was found to have clotted the stent and was re-admitted. She continued to have pain after discharge and had her sister drive her to [facility] ED where she had a vascular consult and established care with vascular MD. She went to office appointment today where she states they were unable to find pulses to her left foot. She was transferred for vascular intervention. She states that she has been unable to walk on the foot and pain has been unbearable requiring narcotic pain relief. In addition she has DM1 and HTN. She states she is compliant with home medications and has an insulin pump for DM management. Review of Systems:     Constitutional:  negative for chills, fevers, sweats  Respiratory:  negative for cough, dyspnea on exertion, shortness of breath, wheezing  Cardiovascular:  negative for chest pain, chest pressure/discomfort, lower extremity edema, palpitations  Gastrointestinal:  negative for abdominal pain, constipation, diarrhea, nausea, vomiting  Neurological:  negative for dizziness, headache  + Pain and small area of gangrene left great toe  Medications: Allergies:     Allergies   Allergen Reactions    Latex Rash    Demerol Hcl [Meperidine] Nausea And Vomiting       Current Meds:   Scheduled Meds:    insulin lispro  0-18 Units SubCUTAneous TID WC    insulin lispro  0-9 Units SubCUTAneous Nightly    scopolamine  1 patch TransDERmal Once    sodium chloride flush  5-40 mL IntraVENous 2 times per day    NIFEdipine  30 mg Oral Daily    nitroglycerin  0.5 inch Topical 4 times per day    cilostazol  100 mg Oral BID    aspirin  81 mg Oral Daily    gabapentin  300 mg Oral 3 times per day    buPROPion  150 mg Oral QAM    metoprolol tartrate  12.5 mg Oral BID    sodium chloride flush  5-40 mL IntraVENous 2 times per day Continuous Infusions:    dextrose      sodium chloride      HYDROmorphone      sodium chloride      heparin (PORCINE) Infusion 20 Units/kg/hr (22 0557)     PRN Meds: glucose, dextrose, glucagon (rDNA), dextrose, sodium chloride flush, sodium chloride, oxyCODONE, naloxone, albuterol, sodium chloride flush, sodium chloride, ondansetron **OR** ondansetron, acetaminophen **OR** acetaminophen, polyethylene glycol, heparin (porcine), heparin (porcine)    Data:     Past Medical History:   has a past medical history of Former smoker, Gangrene of toe ([facility] [address] HTN (hypertension), [facility]per[facility] and Type 1 diabetes mellitus ([facility] Social History:   reports that she quit smoking about 3 months ago. She quit after 25.00 years of use. She has never used smokeless tobacco. She reports current alcohol use. She reports that she does not use drugs. Family History:   Family History   Problem Relation Age of Onset    Other Mother     Other Father     Diabetes Sister        Vitals:  BP (!) 90/57   Pulse 75   Temp 98.2 °F (36.8 °C) (Oral)   Resp 16   Ht 5' 4\" (1.626 m)   Wt 113 lb (51.3 kg)   SpO2 100%   BMI 19.40 kg/m²   Temp (24hrs), Av.5 °F (36.9 °C), Min:98.2 °F (36.8 °C), Max:99.1 °F (37.3 °C)    Recent Labs     22  1153 22  2100 22  0817 22  1230   POCGLU 153* 162* 92 150*       I/O (24Hr):     Intake/Output Summary (Last 24 hours) at 2022 1332  Last data filed at 2022 0900  Gross per 24 hour   Intake 440.05 ml   Output --   Net 440.05 ml       Labs:  Hematology:  Recent Labs     22  1804 22  1810 22  1827 22  0622 22  0552   WBC  --    < > 11.7* 7.5 5.9   RBC  --    < > 3.99 3.69* 3.16*   HGB  --    < > 13.0 11.6* 10.3*   HCT  --    < > 37.7 35.7* 30.3*   MCV  --    < > 94.5 96.7 95.9   MCH  --    < > 32.6 31.4 32.6   MCHC  --    < > 34.5 32.5 34.0   RDW  --    < > 13.6 13.7 13.0   PLT  --    < > 318 318 228 MPV  --    < > 9.6 9.6 10.1   INR 1.2  --   --   --   --     < > = values in this interval not displayed. Chemistry:  Recent Labs     04/12/22  0816 04/13/22  0622 04/14/22  0552   * 139 134*   K 4.1 4.5 4.2    103 100   CO2 21 24 27   GLUCOSE 147* 75 220*   BUN 10 8 8   CREATININE 0.90 0.92* 0.85   MG 1.9 1.7 1.6   ANIONGAP 9 12 7*   LABGLOM >60 >60 >60   GFRAA >60 >60 >60   CALCIUM 8.8 9.2 9.0   PHOS 4.6* 4.3 3.6     Recent Labs     04/12/22  0128 04/12/22  0736 04/12/22  0816 04/12/22  1153 04/12/22  2100 04/13/22  0622 04/13/22  0817 04/13/22  1230 04/14/22  0552 04/14/22  1143   LABALBU  --   --  3.3*  --   --  3.9  --   --  3.6  --    LABA1C  --   --  7.4*  --   --   --   --   --   --   --    URICACID  --   --   --   --   --   --   --   --   --  3.4   POCGLU 77 180*  --  153* 162*  --  92 150*  --   --      ABG:No results found for: POCPH, PHART, PH, POCPCO2, XUG8QNZ, PCO2, POCPO2, PO2ART, PO2, POCHCO3, WQM1HUZ, HCO3, NBEA, PBEA, BEART, BE, THGBART, THB, JSW2ZHS, LYFX1GOH, A8SYIHUT, O2SAT, FIO2  No results found for: SPECIAL  No results found for: CULTURE    Radiology:  XR CHEST (SINGLE VIEW FRONTAL)    Result Date: 4/11/2022  No evidence of acute cardiopulmonary disease. CTA ABDOMINAL AORTA W BILAT RUNOFF W CONTRAST    Result Date: 4/11/2022  1. Occlusion of the distal left SFA at the level of the stent. The popliteal artery is reconstituted and patent 3 vessel runoff is demonstrated. 2.  Patent right lower extremity runoff. Scattered atheromatous plaque as above. 3.  No aneurysm, dissection or acute aortic abnormality. 4.  Diverticulosis.        Physical Examination:        General appearance:  alert, cooperative and mild distress due to pain left great toe  Mental Status:  oriented to person, place and time and normal affect  Lungs:  clear to auscultation bilaterally, normal effort  Heart:  regular rate and rhythm, no murmur  Abdomen:  soft, nontender, nondistended, normal bowel sounds, no masses, hepatomegaly, splenomegaly  Extremities:  + Small area of gangrene and redness around left great toe   skin:  no gross lesions, rashes, induration    Assessment:        Hospital Problems           Last Modified POA    Peripheral arterial disease ([facility] [address] 4/11/2022 Yes    Gangrene ([facility] [address] 4/11/2022 Yes    Primary hypertension 4/11/2022 Yes    Type 1 diabetes mellitus ([facility] [address] 4/11/2022 Yes      S/p LLE angiogram, pharmacomechanical thrombectomy, angioplasty and stenting. POD # 2    Plan:        1. Vascular recommending transitioning  heparin drip to Eliquis with starter pack  2. Dilaudid PCA managed by vascular for better pain control  3. Manage DM: Patient may continue to use own insulin pump. Carb controlled diet with carb counting for dosing. BS ac/hs. , change insulin sliding scale to high intensity  4. Manage HTN. Continue home meds. 5. DVT prophylaxis: Already on anticoagulation  6. GI prophylaxis  7.  Discharge planning when cleared by vascular and podiatry      Phoenix Watkins MD  4/14/2022  1:32 PM

## 2022-04-14 NOTE — ACP (ADVANCE CARE PLANNING)
Advance Care Planning     Advance Care Planning Inpatient Note  Spiritual Care Department    Today's Date: 4/14/2022  Unit: [facility] 5C NEURO    Received request from [employer]. Upon review of chart and communication with care team, patient's decision making abilities are not in question. . Patient was/were present in the room during visit. Goals of ACP Conversation:  Discuss advance care planning documents    Health Care Decision Makers:       Primary Decision Maker: Pete Perea - Child - [phone]    Summary:  Completed New Documents  Updated Healthcare Decision [facility]    Advance Care Planning Documents (Patient Wishes):  Healthcare Power of /Advance Directive Appointment of Health Care Agent     Assessment:   followed up with spiritual consult requesting assistance in filling out new POA         Interventions:  Provided education on documents for clarity and greater understanding  Assisted in the completion of documents according to patient's wishes at this time    Care Preferences Communicated:   No    Outcomes/Plan:  New advance directive completed. Original given to patient. Copy given to medical to scan in.      Electronically signed by Flaco Crookin Resident on 4/14/2022 at 5:45 PM

## 2022-04-14 NOTE — CONSULTS
(peripheral vascular disease) ([facility] [address] and Type 1 diabetes mellitus ([facility] [address] Past Surgical History   has a past surgical history that includes Appendectomy;  section; Coccyx removal; vascular surgery (Left, 2022); vascular surgery (2022); Hysterectomy (); Cardiac catheterization; and vascular surgery (2022). Medications  Prior to Admission medications    Medication Sig Start Date End Date Taking?  Authorizing Provider   albuterol (ACCUNEB) 0.63 MG/3ML nebulizer solution Take 1 ampule by nebulization every 4 hours as needed for Wheezing    Historical Provider, MD   amLODIPine (NORVASC) 10 MG tablet Take 10 mg by mouth daily    Historical Provider, MD   metoprolol tartrate (LOPRESSOR) 25 MG tablet Take 12.5 mg by mouth 2 times daily    Historical Provider, MD   Cholecalciferol (VITAMIN D3) 50 MCG (2000) CAPS Take 2,000 Units by mouth daily    Historical Provider, MD   Insulin Pump Accessories MISC by Does not apply route    Historical Provider, MD   buPROPion (WELLBUTRIN XL) 150 MG extended release tablet Take 150 mg by mouth every morning    Historical Provider, MD   fenofibrate (TRICOR) 145 MG tablet Take 145 mg by mouth daily    Historical Provider, MD   acetaminophen (TYLENOL) 325 MG tablet Take 1 tablet by mouth every 6 hours as needed for Pain 4/3/22 4/10/22  Shelly Lares MD   ibuprofen (IBU) 400 MG tablet Take 1 tablet by mouth every 6 hours as needed for Pain 4/3/22   Shelly Lares MD    Scheduled Meds:   scopolamine  1 patch TransDERmal Once    insulin lispro  0-6 Units SubCUTAneous TID WC    insulin lispro  0-3 Units SubCUTAneous Nightly    sodium chloride flush  5-40 mL IntraVENous 2 times per day    NIFEdipine  30 mg Oral Daily    nitroglycerin  0.5 inch Topical 4 times per day    cilostazol  100 mg Oral BID    aspirin  81 mg Oral Daily    gabapentin  300 mg Oral 3 times per day    buPROPion  150 mg Oral QAM    metoprolol tartrate  12.5 mg Oral BID    sodium chloride flush  5-40 mL IntraVENous 2 times per day     Continuous Infusions:   dextrose      sodium chloride      HYDROmorphone      sodium chloride      heparin (PORCINE) Infusion 22 Units/kg/hr (22 1900)     PRN Meds:.glucose, dextrose, glucagon (rDNA), dextrose, sodium chloride flush, sodium chloride, oxyCODONE, naloxone, albuterol, sodium chloride flush, sodium chloride, ondansetron **OR** ondansetron, acetaminophen **OR** acetaminophen, polyethylene glycol, heparin (porcine), heparin (porcine)    Allergies  is allergic to latex and demerol hcl [meperidine]. Family History  family history includes Diabetes in her sister; Other in her father and mother. Social History   reports that she quit smoking about 3 months ago. She quit after 25.00 years of use. She has never used smokeless tobacco.   reports current alcohol use. reports no history of drug use. Objective     Vitals:  Patient Vitals for the past 8 hrs:   BP Temp Temp src Pulse Resp SpO2   22 2138 -- -- -- -- -- 97 %   22 117/73 98.4 °F (36.9 °C) Oral 89 11 98 %   22 1950 127/88 98.8 °F (37.1 °C) Oral 94 14 97 %     Average, Min, and Max for last 24 hours Vitals:  TEMPERATURE:  Temp  Av.5 °F (36.9 °C)  Min: 97.9 °F (36.6 °C)  Max: 99.4 °F (37.4 °C)    RESPIRATIONS RANGE: Resp  Av.7  Min: 9  Max: 21    PULSE RANGE: Pulse  Av.4  Min: 59  Max: 100    BLOOD PRESSURE RANGE:  Systolic (27SZA), SD , Min:99 , CMH:071   ; Diastolic (41BQI), OWA:54, Min:61, Max:90      PULSE OXIMETRY RANGE: SpO2  Av.1 %  Min: 92 %  Max: 100 %  I&O:  I/O last 3 completed shifts:   In: 143.1 [I.V.:143.1]  Out: -     CBC:  Recent Labs     22  0816 22  1827 22  0622   WBC 6.2 11.7* 7.5   HGB 12.1 13.0 11.6*   HCT 37.5 37.7 35.7*    318 318        BMP:  Recent Labs     22  1810 22  0816 22  0622    132* 139   K 3.9 4.1 4.5    102 103   CO2 24 21 24   BUN 6 10 8 CREATININE 0.77 0.90 0.92*   GLUCOSE 133* 147* 75   CALCIUM 9.4 8.8 9.2        Coags:  Recent Labs     04/11/22  1804 04/11/22 1952 04/12/22  2218 04/13/22  0622 04/13/22  1638   APTT  --    < > >120.0* 48.8* 58.4*   INR 1.2  --   --   --   --     < > = values in this interval not displayed. Lab Results   Component Value Date    LABA1C 7.4 (H) 04/12/2022     No results found for: SEDRATE  No results found for: CRP      Lower Extremity Physical Exam:  Vascular: DP pulse on the left is weakly palpable and audible, DP on left and PT pulses bilaterally are palpable. CFT <3 seconds to all digits bilaterally except left hallux where CFT is less than 5 seconds. Hair growth is absent to the level of the digits. Nonpitting edema left foot edema. Neuro: Saph/sural/SP/DP/plantar sensation diminished to light touch. Musculoskeletal: Muscle strength is 4/5 to all lower extremity muscle groups, more guarded on the left than the right. Gross deformity is absent. Significant pain to palpation to the left hallux. Dermatologic: Dry stable eschar noted overlying the distal tuft of the left hallux. Left hallux is noted to be blue/purple discolored. Periwound skin is cyanotic. No drainage noted with no associated mal odor. Erythema present from distal digits to dorsal midfoot with mild associated increase in warmth. Does not probe to bone, sinus track, or undermine. No fluctuance, crepitus, or induration. Clinical Images:                  Imaging:   CTA ABDOMINAL AORTA W BILAT RUNOFF W CONTRAST   Final Result   1. Occlusion of the distal left SFA at the level of the stent. The   popliteal artery is reconstituted and patent 3 vessel runoff is demonstrated. 2.  Patent right lower extremity runoff. Scattered atheromatous plaque as   above. 3.  No aneurysm, dissection or acute aortic abnormality. 4.  Diverticulosis.          XR CHEST (SINGLE VIEW FRONTAL)   Final Result   No evidence of acute cardiopulmonary disease. Assessment     Whit Swartz is a 49 y.o. female with   Ischemic hallux with gangrenous changes, left hallux  Suspected shower emboli, left foot  PVD  Type 1 diabetes with neuropathy  History of Raynaud's    Active Problems:    Peripheral arterial disease (HCC)    Gangrene (HCC)    Primary hypertension    Type 1 diabetes mellitus ([facility] [address]  Resolved Problems:    * No resolved hospital problems. *        Plan     Patient examined and evaluated at bedside   Treatment options discussed in detail with the patient  X-ray from 4/3 reviewed: Low suspicion for osteomyelitis or gas  Repeat x-rays ordered  Medical management per primary  Vascular following: Patient started on Pletal and Nitro-Bid ointment  Discussed with patient at this time we will continue with local wound care until further demarcation has occurred. At this time there will be no debridement until further gangrenous changes are established. Patient confirmed understanding and is amenable. Dressing applied to Left foot: Betadine wet-to-dry, DSD  Weightbearing as tolerated to Left lower extremity  Discussed with Dr. Paul Baird, DPM   Podiatric Medicine & Surgery   4/13/2022 at 10:01 PM    I performed a history and physical examination of the patient and discussed management with the resident. I reviewed the residents note and agree with the documented findings and plan of care. Any areas of disagreement are noted on the chart. I was personally present for the key portions of any procedures. I have documented in the chart those procedures where I was not present during the key portions. I have reviewed the Podiatry Resident progress note. I agree with the chief complaint, past medical history, past surgical history, allergies, medications, social and family history as documented unless otherwise noted below.  Documentation of the HPI, Physical Exam and Medical Decision Making performed by medical students or scribes is based on my personal performance of the HPI, PE and MDM. I have personally evaluated this patient and have completed at least one if not all key elements of the E/M (history, physical exam, and MDM). Additional findings are as noted.      Isabel Foster DPM on 4/14/2022 at 4:61 AM  Board Certified, American Board of Podiatric Surgery  Fellow, [employer] [facility]

## 2022-04-15 LAB
ALBUMIN SERPL-MCNC: 3.5 G/DL (ref 3.5–5.2)
ANION GAP SERPL CALCULATED.3IONS-SCNC: 9 MMOL/L (ref 9–17)
BUN BLDV-MCNC: 8 MG/DL (ref 6–20)
CALCIUM SERPL-MCNC: 8.6 MG/DL (ref 8.6–10.4)
CHLORIDE BLD-SCNC: 97 MMOL/L (ref 98–107)
CO2: 26 MMOL/L (ref 20–31)
CREAT SERPL-MCNC: 0.97 MG/DL (ref 0.5–0.9)
GFR AFRICAN AMERICAN: >60 ML/MIN
GFR NON-AFRICAN AMERICAN: 60 ML/MIN
GFR SERPL CREATININE-BSD FRML MDRD: ABNORMAL ML/MIN/{1.73_M2}
GLUCOSE BLD-MCNC: 222 MG/DL (ref 65–105)
GLUCOSE BLD-MCNC: 303 MG/DL (ref 65–105)
GLUCOSE BLD-MCNC: 351 MG/DL (ref 70–99)
HCT VFR BLD CALC: 28.8 % (ref 36.3–47.1)
HEMOGLOBIN: 9.6 G/DL (ref 11.9–15.1)
MAGNESIUM: 1.8 MG/DL (ref 1.6–2.6)
MCH RBC QN AUTO: 31.7 PG (ref 25.2–33.5)
MCHC RBC AUTO-ENTMCNC: 33.3 G/DL (ref 28.4–34.8)
MCV RBC AUTO: 95 FL (ref 82.6–102.9)
NRBC AUTOMATED: 0 PER 100 WBC
PDW BLD-RTO: 12.9 % (ref 11.8–14.4)
PHOSPHORUS: 3.5 MG/DL (ref 2.6–4.5)
PLATELET # BLD: 215 K/UL (ref 138–453)
PMV BLD AUTO: 10 FL (ref 8.1–13.5)
POTASSIUM SERPL-SCNC: 3.8 MMOL/L (ref 3.7–5.3)
RBC # BLD: 3.03 M/UL (ref 3.95–5.11)
SODIUM BLD-SCNC: 132 MMOL/L (ref 135–144)
WBC # BLD: 5.7 K/UL (ref 3.5–11.3)

## 2022-04-15 PROCEDURE — 6370000000 HC RX 637 (ALT 250 FOR IP): Performed by: NURSE PRACTITIONER

## 2022-04-15 PROCEDURE — 99232 SBSQ HOSP IP/OBS MODERATE 35: CPT | Performed by: INTERNAL MEDICINE

## 2022-04-15 PROCEDURE — 82947 ASSAY GLUCOSE BLOOD QUANT: CPT

## 2022-04-15 PROCEDURE — 2580000003 HC RX 258: Performed by: STUDENT IN AN ORGANIZED HEALTH CARE EDUCATION/TRAINING PROGRAM

## 2022-04-15 PROCEDURE — 6360000002 HC RX W HCPCS: Performed by: STUDENT IN AN ORGANIZED HEALTH CARE EDUCATION/TRAINING PROGRAM

## 2022-04-15 PROCEDURE — 6370000000 HC RX 637 (ALT 250 FOR IP): Performed by: STUDENT IN AN ORGANIZED HEALTH CARE EDUCATION/TRAINING PROGRAM

## 2022-04-15 PROCEDURE — 83735 ASSAY OF MAGNESIUM: CPT

## 2022-04-15 PROCEDURE — 80069 RENAL FUNCTION PANEL: CPT

## 2022-04-15 PROCEDURE — 36415 COLL VENOUS BLD VENIPUNCTURE: CPT

## 2022-04-15 PROCEDURE — 85027 COMPLETE CBC AUTOMATED: CPT

## 2022-04-15 PROCEDURE — 2060000000 HC ICU INTERMEDIATE R&B

## 2022-04-15 PROCEDURE — 6370000000 HC RX 637 (ALT 250 FOR IP): Performed by: INTERNAL MEDICINE

## 2022-04-15 RX ORDER — INSULIN GLARGINE 100 [IU]/ML
15 INJECTION, SOLUTION SUBCUTANEOUS ONCE
Status: COMPLETED | OUTPATIENT
Start: 2022-04-15 | End: 2022-04-15

## 2022-04-15 RX ORDER — ALPRAZOLAM 0.25 MG/1
0.25 TABLET ORAL ONCE
Status: COMPLETED | OUTPATIENT
Start: 2022-04-15 | End: 2022-04-15

## 2022-04-15 RX ADMIN — APIXABAN 10 MG: 5 TABLET, FILM COATED ORAL at 18:47

## 2022-04-15 RX ADMIN — ACETAMINOPHEN 650 MG: 325 TABLET ORAL at 22:04

## 2022-04-15 RX ADMIN — NITROGLYCERIN 0.5 INCH: 20 OINTMENT TOPICAL at 06:17

## 2022-04-15 RX ADMIN — CILOSTAZOL 100 MG: 100 TABLET ORAL at 20:51

## 2022-04-15 RX ADMIN — GABAPENTIN 300 MG: 300 CAPSULE ORAL at 14:21

## 2022-04-15 RX ADMIN — METOPROLOL TARTRATE 12.5 MG: 25 TABLET ORAL at 08:35

## 2022-04-15 RX ADMIN — METOPROLOL TARTRATE 12.5 MG: 25 TABLET ORAL at 20:51

## 2022-04-15 RX ADMIN — OXYCODONE 5 MG: 5 TABLET ORAL at 22:04

## 2022-04-15 RX ADMIN — HYDROMORPHONE HYDROCHLORIDE 0.5 MG: 1 INJECTION, SOLUTION INTRAMUSCULAR; INTRAVENOUS; SUBCUTANEOUS at 15:30

## 2022-04-15 RX ADMIN — NIFEDIPINE 30 MG: 30 TABLET, FILM COATED, EXTENDED RELEASE ORAL at 08:35

## 2022-04-15 RX ADMIN — OXYCODONE 5 MG: 5 TABLET ORAL at 18:19

## 2022-04-15 RX ADMIN — SODIUM CHLORIDE, PRESERVATIVE FREE 10 ML: 5 INJECTION INTRAVENOUS at 20:23

## 2022-04-15 RX ADMIN — SODIUM CHLORIDE, PRESERVATIVE FREE 10 ML: 5 INJECTION INTRAVENOUS at 08:35

## 2022-04-15 RX ADMIN — INSULIN GLARGINE 15 UNITS: 100 INJECTION, SOLUTION SUBCUTANEOUS at 22:34

## 2022-04-15 RX ADMIN — INSULIN LISPRO 2 UNITS: 100 INJECTION, SOLUTION INTRAVENOUS; SUBCUTANEOUS at 22:36

## 2022-04-15 RX ADMIN — BUPROPION HYDROCHLORIDE 150 MG: 150 TABLET, EXTENDED RELEASE ORAL at 08:35

## 2022-04-15 RX ADMIN — NITROGLYCERIN 0.5 INCH: 20 OINTMENT TOPICAL at 22:42

## 2022-04-15 RX ADMIN — HYDROMORPHONE HYDROCHLORIDE 0.5 MG: 1 INJECTION, SOLUTION INTRAMUSCULAR; INTRAVENOUS; SUBCUTANEOUS at 11:51

## 2022-04-15 RX ADMIN — GABAPENTIN 300 MG: 300 CAPSULE ORAL at 06:16

## 2022-04-15 RX ADMIN — APIXABAN 10 MG: 5 TABLET, FILM COATED ORAL at 06:17

## 2022-04-15 RX ADMIN — NITROGLYCERIN 0.5 INCH: 20 OINTMENT TOPICAL at 00:37

## 2022-04-15 RX ADMIN — OXYCODONE 5 MG: 5 TABLET ORAL at 03:22

## 2022-04-15 RX ADMIN — NITROGLYCERIN 0.5 INCH: 20 OINTMENT TOPICAL at 18:00

## 2022-04-15 RX ADMIN — OXYCODONE 5 MG: 5 TABLET ORAL at 08:42

## 2022-04-15 RX ADMIN — ASPIRIN 81 MG: 81 TABLET, CHEWABLE ORAL at 08:35

## 2022-04-15 RX ADMIN — CILOSTAZOL 100 MG: 100 TABLET ORAL at 08:35

## 2022-04-15 RX ADMIN — OXYCODONE 5 MG: 5 TABLET ORAL at 14:20

## 2022-04-15 RX ADMIN — SODIUM CHLORIDE, PRESERVATIVE FREE 10 ML: 5 INJECTION INTRAVENOUS at 06:17

## 2022-04-15 RX ADMIN — GABAPENTIN 300 MG: 300 CAPSULE ORAL at 20:51

## 2022-04-15 RX ADMIN — HYDROMORPHONE HYDROCHLORIDE 0.5 MG: 1 INJECTION, SOLUTION INTRAMUSCULAR; INTRAVENOUS; SUBCUTANEOUS at 20:22

## 2022-04-15 RX ADMIN — ALPRAZOLAM 0.25 MG: 0.25 TABLET ORAL at 20:49

## 2022-04-15 ASSESSMENT — PAIN DESCRIPTION - ORIENTATION
ORIENTATION: LEFT

## 2022-04-15 ASSESSMENT — PAIN DESCRIPTION - FREQUENCY
FREQUENCY: CONTINUOUS

## 2022-04-15 ASSESSMENT — PAIN DESCRIPTION - LOCATION
LOCATION: FOOT

## 2022-04-15 ASSESSMENT — PAIN SCALES - GENERAL
PAINLEVEL_OUTOF10: 9
PAINLEVEL_OUTOF10: 10
PAINLEVEL_OUTOF10: 7
PAINLEVEL_OUTOF10: 10
PAINLEVEL_OUTOF10: 8
PAINLEVEL_OUTOF10: 4
PAINLEVEL_OUTOF10: 10
PAINLEVEL_OUTOF10: 6
PAINLEVEL_OUTOF10: 10
PAINLEVEL_OUTOF10: 8

## 2022-04-15 ASSESSMENT — PAIN DESCRIPTION - DESCRIPTORS
DESCRIPTORS: ACHING
DESCRIPTORS: ACHING
DESCRIPTORS: ACHING;CONSTANT
DESCRIPTORS: ACHING
DESCRIPTORS: ACHING;CONSTANT

## 2022-04-15 ASSESSMENT — PAIN DESCRIPTION - PAIN TYPE
TYPE: CHRONIC PAIN

## 2022-04-15 NOTE — PROGRESS NOTES
Vascular Surgery Progress Note            PATIENT NAME: Mirta Ordaz     TODAY'S DATE: 4/15/2022, 8:17 AM    SUBJECTIVE:    Pt seen and examined. Ms Jose Catherine is POD #3 s/p LLE angiogram, pharmacomechanical thrombectomy, angioplasty and stenting. She continues to complain of Left foot pain. Notes that she was able to use less of her PCA pump overnight, mostly due to getting better sleep. She denies any CP, SOB, nausea, vomiting, diarrhea, fevers or chills overnight. Has been ambulating in her room. UOP wnl. Continues on 2L NC while sleeping with SpO2 of 98%. She is tolerating diet and liquids well. Continues to use her own insulin pump. Quit smoking 3 months ago and states will continue cessation. 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 She lives with her daughter at home and plans to return there upon discharge. VSS. Afebrile. Morning labs reviewed. WBC 5.7, Hgb 9.6, BUN 8, Cr 0.97, Mg 1.8   RN also reports no acute events overnight. OBJECTIVE:   VITALS:  /71   Pulse 80   Temp 98.4 °F (36.9 °C) (Oral)   Resp 18   Ht 5' 4\" (1.626 m)   Wt 113 lb (51.3 kg)   SpO2 98%   BMI 19.40 kg/m²      INTAKE/OUTPUT:      Intake/Output Summary (Last 24 hours) at 4/15/2022 0817  Last data filed at 4/15/2022 0418  Gross per 24 hour   Intake 13.2 ml   Output --   Net 13.2 ml       PHYSICAL EXAM  GEN: Alert, awake, oriented, NAD  HEENT: normocephalic, no scleral icterus, moist mucous membranes  CV: regular, rate and rhythm   LUNG: no respiratory distress, no audible wheezing  ABDOMEN: soft, non-distended, non-tender  EXTREMITIES: No edema or clubbing. Left great toe dark discoloration. Right and Left PT/ DP doppler signals present.      Data:  CBC with Differential:    Lab Results   Component Value Date    WBC 5.7 04/15/2022    RBC 3.03 04/15/2022    HGB 9.6 04/15/2022    HCT 28.8 04/15/2022     04/15/2022    MCV 95.0 04/15/2022    MCH 31.7 04/15/2022    MCHC 33.3 04/15/2022    RDW 12.9 04/15/2022    LYMPHOPCT 28 04/12/2022    MONOPCT 6 04/12/2022    BASOPCT 1 04/12/2022    MONOSABS 0.75 04/12/2022    LYMPHSABS 3.25 04/12/2022    EOSABS 0.22 04/12/2022    BASOSABS 0.06 04/12/2022     BMP:    Lab Results   Component Value Date     04/15/2022    K 3.8 04/15/2022    CL 97 04/15/2022    CO2 26 04/15/2022    BUN 8 04/15/2022    LABALBU 3.5 04/15/2022    CREATININE 0.97 04/15/2022    CALCIUM 8.6 04/15/2022    GFRAA >60 04/15/2022    LABGLOM 60 04/15/2022    GLUCOSE 351 04/15/2022       Radiology Review:   No new radiology to review this morning. ASSESSMENT   50 yo female POD #3 s/p LLE angiogram, pharmacomechanical thrombectomy, angioplasty and stenting. Plan  1. Continue Eliquis 10 mg BID (starter dose pack) from 4/14 - 4/21. Then continue Eliquis 5 mg BID. 2. Continue Pletal and Procardia. 3. Continue nitro-bid 2% ointment to left great toe. 4. Toshia hugger PRN. 5. Neurovascular checks every 2 hours. 6. Stop PCA   7. Additional pain control with roxicodone and gabapentin. 8. Encourage IS and OOB. 9. Podiatry is following. Local wound care per podiatry. 10. Medical management per primary. 11. We will continue to follow Ms. Ezequiel Jesi. Thank you. Emerita Lopez, MS3  Vascular Surgery Service  [facility]   4/15/2022 at 8:33 AM    Addendum:    Patient seen and examined with Medical Student and Surgical Team.  Agree with assessment and plan with changes made accordingly.     Lindsey Mchugh, DO  PGY-3 General Surgery  04/15/22  8:43 AM

## 2022-04-15 NOTE — PROGRESS NOTES
Progress Note  Podiatric Medicine and Surgery     Subjective     CC:  Left hallux gangrene    Patient seen and examined at bedside. She is sleeping soundly, karo hugger in place to lower extremities. Afebrile, vital signs stable       HPI :  Ron Spencer is a 49 y.o. female seen at [place] for worsening left hallux pain and gangrenous changes. Patient states that a couple months ago she underwent a partial nail avulsion to her left hallux by her podiatrist.  Over the past few months following procedure she noted that her left hallux continue to get purple and blue in color. Patient states that for some time she has had an issue with her circulation to her toes. She was seen by her vascular surgeon who sent her promptly to the ER for critical limb ischemia of the left foot since pulses were nonpalpable or audible on Doppler. Patient underwent a left femoral stent in March. Patient underwent a left angiogram which showed three-vessel runoff. Patient states that her right hallux is extremely painful at all times. She does note that she has a history of Raynaud's in her hands and has also noticed symptoms in her feet where her vessels going to spasm and turn white, red, and blue/purple after some time. Patient states that it affects her left foot more recently than her right. Denies any recent injuries or trauma. Patient is a type I diabetic with a recent A1c 7.4% as of 4/12/2022. ROS: Denies N/V/F/C/SOB/CP. Otherwise negative except at stated in the HPI.      Medications:  Scheduled Meds:   insulin lispro  0-18 Units SubCUTAneous TID     insulin lispro  0-9 Units SubCUTAneous Nightly    apixaban  10 mg Oral BID    Followed by    Phillip Garcia ON 4/21/2022] apixaban  5 mg Oral BID    scopolamine  1 patch TransDERmal Once    sodium chloride flush  5-40 mL IntraVENous 2 times per day    NIFEdipine  30 mg Oral Daily    nitroglycerin  0.5 inch Topical 4 times per day    cilostazol  100 mg Oral BID    aspirin  81 mg Oral Daily    gabapentin  300 mg Oral 3 times per day    buPROPion  150 mg Oral QAM    metoprolol tartrate  12.5 mg Oral BID    sodium chloride flush  5-40 mL IntraVENous 2 times per day       Continuous Infusions:   dextrose      sodium chloride      sodium chloride         PRN Meds:HYDROmorphone, glucose, dextrose, glucagon (rDNA), dextrose, sodium chloride flush, sodium chloride, oxyCODONE, albuterol, sodium chloride flush, sodium chloride, ondansetron **OR** ondansetron, acetaminophen **OR** acetaminophen, polyethylene glycol    Objective     Vitals:  Patient Vitals for the past 8 hrs:   Pulse   04/15/22 0532 80   04/15/22 0418 85   04/15/22 0400 77     Average, Min, and Max for last 24 hours Vitals:  TEMPERATURE:  Temp  Av.1 °F (36.7 °C)  Min: 97.7 °F (36.5 °C)  Max: 98.4 °F (36.9 °C)    RESPIRATIONS RANGE: Resp  Av.8  Min: 16  Max: 18    PULSE RANGE: Pulse  Av.2  Min: 75  Max: 100    BLOOD PRESSURE RANGE:  Systolic (17EWI), MAV:185 , Min:89 , JRT:108   ; Diastolic (94VWU), XXS:94, Min:57, Max:79      PULSE OXIMETRY RANGE: SpO2  Av.8 %  Min: 98 %  Max: 100 %    I/O last 3 completed shifts: In: 320.2 [P.O.:300; I.V.:20.2]  Out: -     CBC:  Recent Labs     04/13/22  0622 04/14/22  0552 04/15/22  0439   WBC 7.5 5.9 5.7   HGB 11.6* 10.3* 9.6*   HCT 35.7* 30.3* 28.8*    228 215        BMP:  Recent Labs     22  0552 04/15/22  0439    134* 132*   K 4.5 4.2 3.8    100 97*   CO2 24 27 26   BUN 8 8 8   CREATININE 0.92* 0.85 0.97*   GLUCOSE 75 220* 351*   CALCIUM 9.2 9.0 8.6        Coags:  Recent Labs     22  0014 22  0649 22  1248   APTT 98.4* 70.8* 50.9*       No results found for: SEDRATE  No results for input(s): CRP in the last 72 hours. LLE Physical Exam:  Vascular: DP/PT audible on doppler. CFT <3 seconds to all digits bilaterally except left hallux where CFT is less than 4 seconds.   Hair growth is absent to the level of the digits. Minimal nonpitting edema. Neuro: Patient asleep      Musculoskeletal: Gross deformity absent. Compartments soft & compressible. Dermatologic: No open wounds, dry stable eschar noted overlying the distal tuft of the hallux. Periwound cyanosis is improving, blanchable. No drainage noted, no mal odor. Erythema present from distal digits to dorsal midfoot with mild associated increase in warmth. No fluctuance, crepitus, or induration. Clinical Images:        Imaging:   XR FOOT LEFT (MIN 3 VIEWS)   Final Result   Unremarkable left foot. CTA ABDOMINAL AORTA W BILAT RUNOFF W CONTRAST   Final Result   1. Occlusion of the distal left SFA at the level of the stent. The   popliteal artery is reconstituted and patent 3 vessel runoff is demonstrated. 2.  Patent right lower extremity runoff. Scattered atheromatous plaque as   above. 3.  No aneurysm, dissection or acute aortic abnormality. 4.  Diverticulosis. XR CHEST (SINGLE VIEW FRONTAL)   Final Result   No evidence of acute cardiopulmonary disease. Assessment   Rita Orozco is a 49 y.o. female with   S/p LLE angiogram, pharmacomechanical thrombectomy, angioplasty, and stenting (DOS: 4/12/2022)  Ischemic hallux with gangrenous changes, left hallux  Suspected shower emboli, left foot  PVD  Type 1 diabetes with neuropathy  History of Raynaud's      Active Problems:    Peripheral arterial disease ([address]    Gangrene ([address]    Primary hypertension    Type 1 diabetes mellitus ([address]  Resolved Problems:    * No resolved hospital problems. *       Plan     Patient seen and evaluated at bedside   Continue medical management per primary  Vascular following:   Continue Eliquis  Maintain better however  Continue Nitropaste  Overall limb ischemia of the left foot seems to be improving. Weightbearing as tolerated to Left lower extremity in surgical shoe. No acute surgical intervention.  Podiatry to sign off at this time. Nursing to continue daily dressing changes. Please [facility] podiatry resident on call with any questions or concerns. Thank you. Will discuss with Dr. Eneida Haji DPM   Podiatric Medicine & Surgery   4/15/2022 at 9:54 AM      I performed a history and physical examination of the patient and discussed management with the resident. I reviewed the residents note and agree with the documented findings and plan of care. Any areas of disagreement are noted on the chart. I was personally present for the key portions of any procedures. I have documented in the chart those procedures where I was not present during the key portions. I have reviewed the Podiatry Resident progress note. I agree with the chief complaint, past medical history, past surgical history, allergies, medications, social and family history as documented unless otherwise noted below. Documentation of the HPI, Physical Exam and Medical Decision Making performed by medical students or scribes is based on my personal performance of the HPI, PE and MDM. I have personally evaluated this patient and have completed at least one if not all key elements of the E/M (history, physical exam, and MDM). Additional findings are as noted.      Srinivas Hill DPM on 4/18/2022 at 0:78 PM  Board Certified, American Board of Podiatric Surgery  Fellow, [employer] [facility] [employer] [facility]

## 2022-04-15 NOTE — PROGRESS NOTES
[facility]  Office: [address], DO, Annalise Juliet, DO, Donaltripp Keys, DO, Venkat Liao Blood, DO, Loni Randall MD, Brice Patterson MD, Mayur Peralta MD, Ruddy Monson MD, Darryn Veloz MD, Marcell Wilkinson MD, Theresa Alford MD, Benoit Rm, DO, Naomi Jasso, DO, Sammy Dang MD,  Kwesi Guillen, DO, Tremaine Chen MD, Elyssa Benites MD, Latrice Grove MD, Dedrick Hernandes DO, Doron Easley MD, Carlo Cuevas MD, [place], [facility], [facility], [facility], Nancy Gallo, CNP, Erika Jean Baptiste, CNP, Ruben Juarez, CNP, Ed Fried, CNP, Catheryn Leventhal, PA-C, Estefany Palma, [facility], Teja Owusu, CNP, Radhika De La Vega, CNP, Pino Gray, CNP, Ángel Upton, CNS, Foster Mathew, [facility], Nina Coats, CNP, Jamie Flynn, CNP, Maddie Law, [facility]    Progress Note    4/15/2022    2:08 PM    Name:   Cierra Perkins  MRN:     [id number]     Acct:      [de-identified]   Room:   [address] [place] [address] Day:  4  Admit Date:  4/11/2022  2:59 PM    PCP:   Frankey Duran  Code Status:  Full Code    Subjective:     C/C: Pain left great toe    Interval History Status:   Patient had  LLE angiogram, pharmacomechanical thrombectomy, angioplasty and stenting  4/12/2022  Still complaining of pain in left foot , off Dilaudid PCA, currently all oral pain medicines and oral nifedipine and Nitropaste on left great toe  Blood sugars 351, A1c 7.4 currently on high intensity insulin sliding scale, using her own insulin pump, states has DM 1  Brief History:   Cierra Perkins is a 49 y.o. Non- / non  female who presents with No chief complaint on file. and is admitted to the hospital for the management of <principal problem not specified>.     Sabrina Gamino is a 49year old female transferred from vascular surgeon office for direct admission for pain and gangrene to the left great toe.  She states that she has been seen over the past several months on several occasions for pain and vascular disease to the left leg. She was admitted initially at [facility] and had a left femoral stent placed in March. Following discharge she was found to have clotted the stent and was re-admitted. She continued to have pain after discharge and had her sister drive her to [facility] ED where she had a vascular consult and established care with vascular MD. She went to office appointment today where she states they were unable to find pulses to her left foot. She was transferred for vascular intervention. She states that she has been unable to walk on the foot and pain has been unbearable requiring narcotic pain relief. In addition she has DM1 and HTN. She states she is compliant with home medications and has an insulin pump for DM management. Review of Systems:     Constitutional:  negative for chills, fevers, sweats  Respiratory:  negative for cough, dyspnea on exertion, shortness of breath, wheezing  Cardiovascular:  negative for chest pain, chest pressure/discomfort, lower extremity edema, palpitations  Gastrointestinal:  negative for abdominal pain, constipation, diarrhea, nausea, vomiting  Neurological:  negative for dizziness, headache  + Pain and small area of gangrene left great toe  Medications: Allergies:     Allergies   Allergen Reactions    Latex Rash    Demerol Hcl [Meperidine] Nausea And Vomiting       Current Meds:   Scheduled Meds:    insulin lispro  0-18 Units SubCUTAneous TID WC    insulin lispro  0-9 Units SubCUTAneous Nightly    apixaban  10 mg Oral BID    Followed by   Caitlin Wyman ON 4/21/2022] apixaban  5 mg Oral BID    scopolamine  1 patch TransDERmal Once    sodium chloride flush  5-40 mL IntraVENous 2 times per day    NIFEdipine  30 mg Oral Daily    nitroglycerin  0.5 inch Topical 4 times per day    cilostazol  100 mg Oral BID    aspirin  81 mg Oral Daily    gabapentin  300 mg Oral 3 times per day    buPROPion  150 mg Oral QAM    metoprolol tartrate  12.5 mg Oral BID    sodium chloride flush  5-40 mL IntraVENous 2 times per day     Continuous Infusions:    dextrose      sodium chloride      sodium chloride       PRN Meds: HYDROmorphone, glucose, dextrose, glucagon (rDNA), dextrose, sodium chloride flush, sodium chloride, oxyCODONE, albuterol, sodium chloride flush, sodium chloride, ondansetron **OR** ondansetron, acetaminophen **OR** acetaminophen, polyethylene glycol    Data:     Past Medical History:   has a past medical history of Former smoker, Gangrene of toe ([facility] HTN (hypertension), [facility] and Type 1 diabetes mellitus ([facility] Social History:   reports that she quit smoking about 3 months ago. She quit after 25.00 years of use. She has never used smokeless tobacco. She reports current alcohol use. She reports that she does not use drugs. Family History:   Family History   Problem Relation Age of Onset    Other Mother     Other Father     Diabetes Sister        Vitals:  /69   Pulse 92   Temp 98.1 °F (36.7 °C) (Oral)   Resp 12   Ht 5' 4\" (1.626 m)   Wt 113 lb (51.3 kg)   SpO2 98%   BMI 19.40 kg/m²   Temp (24hrs), Av.1 °F (36.7 °C), Min:97.7 °F (36.5 °C), Max:98.4 °F (36.9 °C)    Recent Labs     22  2100 22  0817 22  1230 04/15/22  0732   POCGLU 162* 92 150* 303*       I/O (24Hr):     Intake/Output Summary (Last 24 hours) at 4/15/2022 1408  Last data filed at 4/15/2022 0418  Gross per 24 hour   Intake 5.7 ml   Output --   Net 5.7 ml       Labs:  Hematology:  Recent Labs     22  0622 22  0552 04/15/22  0439   WBC 7.5 5.9 5.7   RBC 3.69* 3.16* 3.03*   HGB 11.6* 10.3* 9.6*   HCT 35.7* 30.3* 28.8*   MCV 96.7 95.9 95.0   MCH 31.4 32.6 31.7   MCHC 32.5 34.0 33.3   RDW 13.7 13.0 12.9    228 215   MPV 9.6 10.1 10.0     Chemistry:  Recent Labs     22  0622 22  0552 04/15/22  0439    134* 132*   K 4.5 4.2 3.8    100 97*   CO2 24 27 26   GLUCOSE 75 220* 351*   BUN 8 8 8   CREATININE 0.92* 0.85 0.97*   MG 1.7 1.6 1.8   ANIONGAP 12 7* 9   LABGLOM >60 >60 60*   GFRAA >60 >60 >60   CALCIUM 9.2 9.0 8.6   PHOS 4.3 3.6 3.5     Recent Labs     04/12/22  2100 04/13/22  0622 04/13/22  0817 04/13/22  1230 04/14/22  0552 04/14/22  1143 04/15/22  0439 04/15/22  0732   LABALBU  --  3.9  --   --  3.6  --  3.5  --    URICACID  --   --   --   --   --  3.4  --   --    POCGLU 162*  --  92 150*  --   --   --  303*     ABG:No results found for: POCPH, PHART, PH, POCPCO2, LUO5BUE, PCO2, POCPO2, PO2ART, PO2, POCHCO3, ISI7DNN, HCO3, NBEA, PBEA, BEART, BE, THGBART, THB, BWF1PHC, SKQS4ZWW, Y7QNLUHL, O2SAT, FIO2  No results found for: SPECIAL  No results found for: CULTURE    Radiology:  XR CHEST (SINGLE VIEW FRONTAL)    Result Date: 4/11/2022  No evidence of acute cardiopulmonary disease. CTA ABDOMINAL AORTA W BILAT RUNOFF W CONTRAST    Result Date: 4/11/2022  1. Occlusion of the distal left SFA at the level of the stent. The popliteal artery is reconstituted and patent 3 vessel runoff is demonstrated. 2.  Patent right lower extremity runoff. Scattered atheromatous plaque as above. 3.  No aneurysm, dissection or acute aortic abnormality. 4.  Diverticulosis.        Physical Examination:        General appearance:  alert, cooperative and minimal distress due to pain left great toe  Mental Status:  oriented to person, place and time and normal affect  Lungs:  clear to auscultation bilaterally, normal effort  Heart:  regular rate and rhythm, no murmur  Abdomen:  soft, nontender, nondistended, normal bowel sounds, no masses, hepatomegaly, splenomegaly  Extremities:  + Small area of gangrene and redness around left great toe   skin:  no gross lesions, rashes, induration    Assessment:        Hospital Problems           Last Modified POA    Peripheral arterial disease ([address] 4/11/2022 Yes    [facility] ([facility] 4/11/2022 Yes    Primary hypertension 4/11/2022 Yes    Type 1 diabetes mellitus ([facility] 4/11/2022 Yes      S/p LLE angiogram, pharmacomechanical thrombectomy, angioplasty and stenting. POD # 3    Plan:        1. Transitioned heparin drip to Eliquis with starter pack  2. Oral pain medicines managed by vascular for better pain control  3. Manage DM: Patient may continue to use own insulin pump. Carb controlled diet with carb counting for dosing. BS ac/hs. , changed insulin sliding scale to high intensity  4. Manage HTN. Continue home meds. 5. DVT prophylaxis: Already on oral anticoagulation  6. GI prophylaxis  7.  Discharge planning when cleared by vascular, podiatry has signed off      Rita Edge MD  4/15/2022  2:08 PM

## 2022-04-16 LAB
ALBUMIN SERPL-MCNC: 3.8 G/DL (ref 3.5–5.2)
ANION GAP SERPL CALCULATED.3IONS-SCNC: 10 MMOL/L (ref 9–17)
BUN BLDV-MCNC: 9 MG/DL (ref 6–20)
CALCIUM SERPL-MCNC: 9.3 MG/DL (ref 8.6–10.4)
CHLORIDE BLD-SCNC: 94 MMOL/L (ref 98–107)
CO2: 27 MMOL/L (ref 20–31)
CREAT SERPL-MCNC: 0.96 MG/DL (ref 0.5–0.9)
GFR AFRICAN AMERICAN: >60 ML/MIN
GFR NON-AFRICAN AMERICAN: >60 ML/MIN
GFR SERPL CREATININE-BSD FRML MDRD: ABNORMAL ML/MIN/{1.73_M2}
GLUCOSE BLD-MCNC: 175 MG/DL (ref 65–105)
GLUCOSE BLD-MCNC: 221 MG/DL (ref 65–105)
GLUCOSE BLD-MCNC: 303 MG/DL (ref 65–105)
GLUCOSE BLD-MCNC: 324 MG/DL (ref 65–105)
GLUCOSE BLD-MCNC: 324 MG/DL (ref 65–105)
GLUCOSE BLD-MCNC: 358 MG/DL (ref 70–99)
HCT VFR BLD CALC: 31 % (ref 36.3–47.1)
HEMOGLOBIN: 10.3 G/DL (ref 11.9–15.1)
MAGNESIUM: 1.8 MG/DL (ref 1.6–2.6)
MCH RBC QN AUTO: 32 PG (ref 25.2–33.5)
MCHC RBC AUTO-ENTMCNC: 33.2 G/DL (ref 28.4–34.8)
MCV RBC AUTO: 96.3 FL (ref 82.6–102.9)
NRBC AUTOMATED: 0 PER 100 WBC
PDW BLD-RTO: 13.1 % (ref 11.8–14.4)
PHOSPHORUS: 4.2 MG/DL (ref 2.6–4.5)
PLATELET # BLD: 250 K/UL (ref 138–453)
PMV BLD AUTO: 10.5 FL (ref 8.1–13.5)
POTASSIUM SERPL-SCNC: 4.4 MMOL/L (ref 3.7–5.3)
RBC # BLD: 3.22 M/UL (ref 3.95–5.11)
SODIUM BLD-SCNC: 131 MMOL/L (ref 135–144)
WBC # BLD: 5.2 K/UL (ref 3.5–11.3)

## 2022-04-16 PROCEDURE — 6370000000 HC RX 637 (ALT 250 FOR IP): Performed by: NURSE PRACTITIONER

## 2022-04-16 PROCEDURE — 2580000003 HC RX 258: Performed by: STUDENT IN AN ORGANIZED HEALTH CARE EDUCATION/TRAINING PROGRAM

## 2022-04-16 PROCEDURE — 6370000000 HC RX 637 (ALT 250 FOR IP): Performed by: INTERNAL MEDICINE

## 2022-04-16 PROCEDURE — 94761 N-INVAS EAR/PLS OXIMETRY MLT: CPT

## 2022-04-16 PROCEDURE — 36415 COLL VENOUS BLD VENIPUNCTURE: CPT

## 2022-04-16 PROCEDURE — 85027 COMPLETE CBC AUTOMATED: CPT

## 2022-04-16 PROCEDURE — 2060000000 HC ICU INTERMEDIATE R&B

## 2022-04-16 PROCEDURE — 6370000000 HC RX 637 (ALT 250 FOR IP): Performed by: STUDENT IN AN ORGANIZED HEALTH CARE EDUCATION/TRAINING PROGRAM

## 2022-04-16 PROCEDURE — 99232 SBSQ HOSP IP/OBS MODERATE 35: CPT | Performed by: INTERNAL MEDICINE

## 2022-04-16 PROCEDURE — 82947 ASSAY GLUCOSE BLOOD QUANT: CPT

## 2022-04-16 PROCEDURE — 80069 RENAL FUNCTION PANEL: CPT

## 2022-04-16 PROCEDURE — 83735 ASSAY OF MAGNESIUM: CPT

## 2022-04-16 PROCEDURE — 6360000002 HC RX W HCPCS: Performed by: STUDENT IN AN ORGANIZED HEALTH CARE EDUCATION/TRAINING PROGRAM

## 2022-04-16 RX ORDER — INSULIN GLARGINE 100 [IU]/ML
15 INJECTION, SOLUTION SUBCUTANEOUS ONCE
Status: COMPLETED | OUTPATIENT
Start: 2022-04-16 | End: 2022-04-16

## 2022-04-16 RX ADMIN — CILOSTAZOL 100 MG: 100 TABLET ORAL at 21:16

## 2022-04-16 RX ADMIN — OXYCODONE 5 MG: 5 TABLET ORAL at 04:39

## 2022-04-16 RX ADMIN — HYDROMORPHONE HYDROCHLORIDE 0.5 MG: 1 INJECTION, SOLUTION INTRAMUSCULAR; INTRAVENOUS; SUBCUTANEOUS at 15:26

## 2022-04-16 RX ADMIN — ASPIRIN 81 MG: 81 TABLET, CHEWABLE ORAL at 08:13

## 2022-04-16 RX ADMIN — NITROGLYCERIN 0.5 INCH: 20 OINTMENT TOPICAL at 11:05

## 2022-04-16 RX ADMIN — APIXABAN 10 MG: 5 TABLET, FILM COATED ORAL at 18:23

## 2022-04-16 RX ADMIN — OXYCODONE 5 MG: 5 TABLET ORAL at 21:11

## 2022-04-16 RX ADMIN — INSULIN LISPRO 5 UNITS: 100 INJECTION, SOLUTION INTRAVENOUS; SUBCUTANEOUS at 04:30

## 2022-04-16 RX ADMIN — POLYETHYLENE GLYCOL 3350 17 G: 17 POWDER, FOR SOLUTION ORAL at 15:31

## 2022-04-16 RX ADMIN — SODIUM CHLORIDE, PRESERVATIVE FREE 10 ML: 5 INJECTION INTRAVENOUS at 21:20

## 2022-04-16 RX ADMIN — HYDROMORPHONE HYDROCHLORIDE 0.5 MG: 1 INJECTION, SOLUTION INTRAMUSCULAR; INTRAVENOUS; SUBCUTANEOUS at 00:14

## 2022-04-16 RX ADMIN — METOPROLOL TARTRATE 12.5 MG: 25 TABLET ORAL at 21:17

## 2022-04-16 RX ADMIN — OXYCODONE 5 MG: 5 TABLET ORAL at 09:14

## 2022-04-16 RX ADMIN — NIFEDIPINE 30 MG: 30 TABLET, FILM COATED, EXTENDED RELEASE ORAL at 08:13

## 2022-04-16 RX ADMIN — INSULIN GLARGINE 15 UNITS: 100 INJECTION, SOLUTION SUBCUTANEOUS at 09:30

## 2022-04-16 RX ADMIN — BUPROPION HYDROCHLORIDE 150 MG: 150 TABLET, EXTENDED RELEASE ORAL at 08:13

## 2022-04-16 RX ADMIN — HYDROMORPHONE HYDROCHLORIDE 0.5 MG: 1 INJECTION, SOLUTION INTRAMUSCULAR; INTRAVENOUS; SUBCUTANEOUS at 22:12

## 2022-04-16 RX ADMIN — METOPROLOL TARTRATE 12.5 MG: 25 TABLET ORAL at 08:13

## 2022-04-16 RX ADMIN — INSULIN LISPRO 6 UNITS: 100 INJECTION, SOLUTION INTRAVENOUS; SUBCUTANEOUS at 09:31

## 2022-04-16 RX ADMIN — OXYCODONE 5 MG: 5 TABLET ORAL at 13:31

## 2022-04-16 RX ADMIN — APIXABAN 10 MG: 5 TABLET, FILM COATED ORAL at 08:12

## 2022-04-16 RX ADMIN — SODIUM CHLORIDE, PRESERVATIVE FREE 10 ML: 5 INJECTION INTRAVENOUS at 08:13

## 2022-04-16 RX ADMIN — SODIUM CHLORIDE, PRESERVATIVE FREE 10 ML: 5 INJECTION INTRAVENOUS at 22:13

## 2022-04-16 RX ADMIN — HYDROMORPHONE HYDROCHLORIDE 0.5 MG: 1 INJECTION, SOLUTION INTRAMUSCULAR; INTRAVENOUS; SUBCUTANEOUS at 18:44

## 2022-04-16 RX ADMIN — CILOSTAZOL 100 MG: 100 TABLET ORAL at 08:13

## 2022-04-16 RX ADMIN — GABAPENTIN 300 MG: 300 CAPSULE ORAL at 21:16

## 2022-04-16 RX ADMIN — HYDROMORPHONE HYDROCHLORIDE 0.5 MG: 1 INJECTION, SOLUTION INTRAMUSCULAR; INTRAVENOUS; SUBCUTANEOUS at 08:13

## 2022-04-16 RX ADMIN — GABAPENTIN 300 MG: 300 CAPSULE ORAL at 13:31

## 2022-04-16 RX ADMIN — SODIUM CHLORIDE, PRESERVATIVE FREE 10 ML: 5 INJECTION INTRAVENOUS at 21:22

## 2022-04-16 RX ADMIN — NITROGLYCERIN 0.5 INCH: 20 OINTMENT TOPICAL at 19:17

## 2022-04-16 RX ADMIN — GABAPENTIN 300 MG: 300 CAPSULE ORAL at 08:12

## 2022-04-16 RX ADMIN — HYDROMORPHONE HYDROCHLORIDE 0.5 MG: 1 INJECTION, SOLUTION INTRAMUSCULAR; INTRAVENOUS; SUBCUTANEOUS at 11:55

## 2022-04-16 RX ADMIN — OXYCODONE 5 MG: 5 TABLET ORAL at 16:57

## 2022-04-16 ASSESSMENT — PAIN DESCRIPTION - FREQUENCY: FREQUENCY: CONTINUOUS

## 2022-04-16 ASSESSMENT — PAIN DESCRIPTION - ORIENTATION
ORIENTATION: LEFT
ORIENTATION: LEFT

## 2022-04-16 ASSESSMENT — PAIN DESCRIPTION - PAIN TYPE
TYPE: CHRONIC PAIN
TYPE: CHRONIC PAIN

## 2022-04-16 ASSESSMENT — PAIN SCALES - GENERAL
PAINLEVEL_OUTOF10: 9
PAINLEVEL_OUTOF10: 10
PAINLEVEL_OUTOF10: 10
PAINLEVEL_OUTOF10: 8
PAINLEVEL_OUTOF10: 10
PAINLEVEL_OUTOF10: 6
PAINLEVEL_OUTOF10: 8
PAINLEVEL_OUTOF10: 6

## 2022-04-16 ASSESSMENT — PAIN DESCRIPTION - DESCRIPTORS: DESCRIPTORS: ACHING;CONSTANT

## 2022-04-16 ASSESSMENT — PAIN DESCRIPTION - LOCATION
LOCATION: FOOT
LOCATION: FOOT

## 2022-04-16 NOTE — PLAN OF CARE
Problem: Falls - Risk of:  Goal: Will remain free from falls  Description: Will remain free from falls  4/16/2022 1008 by Joseph Ledesma RN  Outcome: Completed  4/16/2022 0554 by Maci Carroll RN  Outcome: Met This Shift  Goal: Absence of physical injury  Description: Absence of physical injury  4/16/2022 1008 by Joseph Ledesma RN  Outcome: Completed  4/16/2022 0554 by Maci Carroll RN  Outcome: Met This Shift     Problem: Infection:  Goal: Will remain free from infection  Description: Will remain free from infection  4/16/2022 1008 by Joseph Ledesma RN  Outcome: Completed  4/16/2022 0554 by Maci Carroll RN  Outcome: Ongoing     Problem: Safety:  Goal: Free from accidental physical injury  Description: Free from accidental physical injury  4/16/2022 1008 by Joseph Ledesma RN  Outcome: Completed  4/16/2022 0554 by Maci Carroll RN  Outcome: Met This Shift  Goal: Free from intentional harm  Description: Free from intentional harm  4/16/2022 1008 by Joseph Ledesma RN  Outcome: Completed  4/16/2022 0554 by Maci Carroll RN  Outcome: Met This Shift     Problem: Daily Care:  Goal: Daily care needs are met  Description: Daily care needs are met  4/16/2022 1008 by Joseph Ledesma RN  Outcome: Completed  4/16/2022 0554 by Maci Carroll RN  Outcome: Met This Shift     Problem: Pain:  Goal: Patient's pain/discomfort is manageable  Description: Patient's pain/discomfort is manageable  4/16/2022 1008 by Joseph Ledesma RN  Outcome: Completed  4/16/2022 0554 by Maci Carroll RN  Outcome: Ongoing  Goal: Pain level will decrease  Description: Pain level will decrease  4/16/2022 1008 by Joseph Ledesma RN  Outcome: Completed  4/16/2022 0554 by Maci Carroll RN  Outcome: Ongoing  Goal: Control of acute pain  Description: Control of acute pain  4/16/2022 1008 by Joseph Ledesma RN  Outcome: Completed  4/16/2022 0554 by Maci Carroll RN  Outcome: Ongoing  Goal: Control of chronic pain  Description:

## 2022-04-16 NOTE — PROGRESS NOTES
Patient and family having concerns about d/c. Primary messaged as well as primary with concerns about pain management and pt potentially having hallucinations.

## 2022-04-16 NOTE — DISCHARGE SUMMARY
[facility]  Office: [address], DO, Ethel Graves, DO, Rex Ocampo DO, Lennox Mars Blood, DO, Darío Joseph MD, Alexis Kowalski MD, Alan Galindo MD, Maximino Avery MD, Crystal Hughes MD, Rod Chavez MD, Krys Jensen MD, Jemima Field, DO, Arian Copeland, DO, Kaitlynn De Los Santos MD,  Vj Lantigua, DO, Burna Cockayne, MD, Rinku Terrazas MD, Sharon Horta MD, Gracie Pablo, DO, Rancho Chavira MD, Angelina Recinos MD, [facility], [facility], CNP, Theresa Mckeon CNP, Kay Rodriguez CNP, China Payne CNP, Carlene Edwards CNP, Ameya Rivero PA-C, Daisey Meigs, [facility], Meredith Jaramillo, CNP, Michelle Gerber, CNP, Sreedhar Barnes, CNP, Paddy Duane, CNS, Jose Carlos Blunt, [facility], Kirsten Reyes, CNP, Jennifer Ortiz, CNP, Jessie Galdamez, [facility]    Discharge Summary     Patient ID: Balbina Farias  :  1965   MRN: [id number]     ACCOUNT:  [de-identified]   Patient's PCP: Mitchael Riedel Date: 2022   Discharge Date: 2022   Length of Stay: 7  Code Status:  Full Code  Admitting Physician: Alejandro Doshi MD  Discharge Physician: Alejandro Doshi MD     Active Discharge Diagnoses:     Hospital Problem Lists:  Active Problems:    Peripheral arterial disease ([facility]    Gangrene ([facility]    Primary hypertension    Type 1 diabetes mellitus ([facility]  Resolved Problems:    * No resolved hospital problems. *      Admission Condition:  poor     Discharged Condition: stable    Hospital Stay:   Admitting history:  Tessa Dumont a 49 y.o. Non- / non  female who presents with No chief complaint on file.   and is admitted to the hospital for the management of <principal problem not specified>.     Marla Canavan is a 49year old female transferred from vascular surgeon office for direct admission for pain and gangrene to the left great toe.  She states that she has been seen over the past several months on several occasions for pain and vascular disease to the left leg. She was admitted initially at [facility] and had a left femoral stent placed in March. Following discharge she was found to have clotted the stent and was re-admitted. She continued to have pain after discharge and had her sister drive her to [facility] ED where she had a vascular consult and established care with vascular MD. She went to office appointment today where she states they were unable to find pulses to her left foot. She was transferred for vascular intervention. She states that she has been unable to walk on the foot and pain has been unbearable requiring narcotic pain relief. In addition she has DM1 and HTN. She states she is compliant with home medications and has an insulin pump for DM management      Hospital Course:   Patient had  LLE angiogram, pharmacomechanical thrombectomy, angioplasty and stenting  4/12/2022  Patient was discharged yesterday however she was kept with the nurse as she wanted vascular to evaluate the patient again, apparently vascular saw the patient today morning. Patient still complains of moderate to severe pain in the left great toe, there is slightly increased redness on the toe  currently on oral nifedipine and Nitropaste on left great toe,  Blood sugars 178, A1c 7.4  states has DM 1  Her insulin pump is working now    Plan:         Continue Eliquis with starter pack  Oral pain medicines managed by vascular for better pain control, will discharge on limited prescription of OxyContin 20 mg twice daily and Roxicodone as needed  She needs to follow-up with pain clinic/PCP as outpatient for further pain medicines  Manage DM: Using her own insulin pump  Manage HTN. Continue home meds. DVT prophylaxis: Already on oral anticoagulation  GI prophylaxis  Start on oral Ceftin due to increased redness on left great toe suggestive of possible getting infection.   Discharge home, new discharge order has been placed again            Significant therapeutic interventions: See above notes    Significant Diagnostic Studies:   Labs / Micro:  CBC:   Lab Results   Component Value Date    WBC 5.2 04/16/2022    RBC 3.22 04/16/2022    HGB 10.3 04/16/2022    HCT 31.0 04/16/2022    MCV 96.3 04/16/2022    MCH 32.0 04/16/2022    MCHC 33.2 04/16/2022    RDW 13.1 04/16/2022     04/16/2022     BMP:    Lab Results   Component Value Date    GLUCOSE 358 04/16/2022     04/16/2022    K 4.4 04/16/2022    CL 94 04/16/2022    CO2 27 04/16/2022    ANIONGAP 10 04/16/2022    BUN 9 04/16/2022    CREATININE 0.96 04/16/2022    CALCIUM 9.3 04/16/2022    LABGLOM >60 04/16/2022    GFRAA >60 04/16/2022    GFR      04/16/2022     HFP:  No results found for: ALB, PROT  CMP:    Lab Results   Component Value Date    GLUCOSE 358 04/16/2022     04/16/2022    K 4.4 04/16/2022    CL 94 04/16/2022    CO2 27 04/16/2022    BUN 9 04/16/2022    CREATININE 0.96 04/16/2022    ANIONGAP 10 04/16/2022    LABALBU 3.8 04/16/2022    LABGLOM >60 04/16/2022    GFRAA >60 04/16/2022    GFR      04/16/2022    CALCIUM 9.3 04/16/2022     PT/INR:    Lab Results   Component Value Date    PROTIME 12.9 04/11/2022    INR 1.2 04/11/2022     PTT:   Lab Results   Component Value Date    APTT 50.9 04/14/2022     FLP:  No results found for: CHOL, TRIG, HDL  U/A:  No results found for: Elfreda Section, SPECGRAV, HGBUR, PHUR, PROTEINU, GLUCOSEU, KETUA, BILIRUBINUR, UROBILINOGEN, NITRU, LEUKOCYTESUR  TSH:  No results found for: TSH     Radiology:  XR CHEST (SINGLE VIEW FRONTAL)    Result Date: 4/11/2022  No evidence of acute cardiopulmonary disease. XR FOOT LEFT (MIN 3 VIEWS)    Result Date: 4/13/2022  Unremarkable left foot. CTA ABDOMINAL AORTA W BILAT RUNOFF W CONTRAST    Result Date: 4/11/2022  1. Occlusion of the distal left SFA at the level of the stent. The popliteal artery is reconstituted and patent 3 vessel runoff is demonstrated.  2.  Patent right lower extremity runoff. Scattered atheromatous plaque as above. 3.  No aneurysm, dissection or acute aortic abnormality. 4.  Diverticulosis. Consultations:    Consults:     Final Specialist Recommendations/Findings:   IP CONSULT TO VASCULAR SURGERY  IP CONSULT TO PODIATRY      The patient was seen and examined on day of discharge and this discharge summary is in conjunction with any daily progress note from day of discharge. Discharge plan:     Disposition: Home    Physician Follow Up:     Kaleb Doherty DPM  [address] [facility]  [facility]  [phone]    Schedule an appointment as soon as possible for a visit in 1 week  For wound re-check    Vanessa Rios MD  [facility], [address] [facility]  [phone]    Schedule an appointment as soon as possible for a visit in 2 weeks  For follow up    [facility] [address] [place] [address]  [phone]    Schedule an appointment as soon as possible for a visit  For follow up       Requiring Further Evaluation/Follow Up POST HOSPITALIZATION/Incidental Findings:  Follow-up with your endocrinologist for managing insulin pump and better diabetes control    Diet: cardiac diet and diabetic diet    Activity: As tolerated    Instructions to Patient: Foot care with podiatry and vascular as scheduled    Discharge Medications:      Medication List      START taking these medications    * apixaban 5 MG Tabs tablet  Commonly known as: ELIQUIS  Take 2 tablets by mouth 2 times daily for 10 doses     * apixaban 5 MG Tabs tablet  Commonly known as: ELIQUIS  Take 1 tablet by mouth 2 times daily  Start taking on: April 22, 2022     aspirin 81 MG chewable tablet  Take 1 tablet by mouth daily     cefUROXime 250 MG tablet  Commonly known as: CEFTIN  Take 1 tablet by mouth every 12 hours for 7 days     cilostazol 100 MG tablet  Commonly known as: PLETAL  Take 1 tablet by mouth 2 times daily     gabapentin 600 MG tablet  Commonly known as: NEURONTIN  Take 1 tablet by mouth every 8 hours for 21 doses. methocarbamol 750 MG tablet  Commonly known as: ROBAXIN  Take 1 tablet by mouth every 6 hours for 10 days     NIFEdipine 30 MG extended release tablet  Commonly known as: PROCARDIA XL  Take 1 tablet by mouth daily     nitroglycerin 2 % ointment  Commonly known as: NITRO-BID  Place 0.5 inches onto the skin every 6 hours     * oxyCODONE 5 MG immediate release tablet  Commonly known as: ROXICODONE  Take 1 tablet by mouth every 4 hours as needed for Pain for up to 10 doses. * oxyCODONE 20 MG extended release tablet  Commonly known as: OXYCONTIN  Take 1 tablet by mouth every 12 hours for 6 doses. * This list has 4 medication(s) that are the same as other medications prescribed for you. Read the directions carefully, and ask your doctor or other care provider to review them with you.             CONTINUE taking these medications    acetaminophen 325 MG tablet  Commonly known as: Tylenol  Take 1 tablet by mouth every 6 hours as needed for Pain     albuterol 0.63 MG/3ML nebulizer solution  Commonly known as: ACCUNEB     buPROPion 150 MG extended release tablet  Commonly known as: WELLBUTRIN XL     fenofibrate 145 MG tablet  Commonly known as: TRICOR     Insulin Pump Accessories Misc     metoprolol tartrate 25 MG tablet  Commonly known as: LOPRESSOR     Vitamin D3 50 MCG (2000 UT) Caps        STOP taking these medications    amLODIPine 10 MG tablet  Commonly known as: NORVASC     ibuprofen 400 MG tablet  Commonly known as: IBU           Where to Get Your Medications      These medications were sent to [place] [id number]    Phone: [phone]   apixaban 5 MG Tabs tablet  apixaban 5 MG Tabs tablet  aspirin 81 MG chewable tablet  cefUROXime 250 MG tablet  cilostazol 100 MG tablet  gabapentin 600 MG tablet  methocarbamol 750 MG tablet  NIFEdipine 30 MG extended release tablet  nitroglycerin 2 % ointment  oxyCODONE 20 MG extended release tablet  oxyCODONE 5 MG immediate release tablet         Discharge Procedure Orders   External Referral To Pain Clinic   Referral Priority: Routine Referral Type: Eval and Treat   Referral Reason: Specialty Services Required   Requested Specialty: Pain Management   Number of Visits Requested: 1       Time Spent on discharge is  31 mins in patient examination, evaluation, counseling as well as medication reconciliation, prescriptions for required medications, discharge plan and follow up. Electronically signed by   Mechelle Matthews MD  4/18/2022  2:45 PM      Thank you Dr. Claudia Hernandez for the opportunity to be involved in this patient's care.

## 2022-04-16 NOTE — PLAN OF CARE
Problem: Falls - Risk of:  Goal: Will remain free from falls  Description: Will remain free from falls  Outcome: Met This Shift  Goal: Absence of physical injury  Description: Absence of physical injury  Outcome: Met This Shift     Problem: Safety:  Goal: Free from accidental physical injury  Description: Free from accidental physical injury  Outcome: Met This Shift  Goal: Free from intentional harm  Description: Free from intentional harm  Outcome: Met This Shift     Problem: Daily Care:  Goal: Daily care needs are met  Description: Daily care needs are met  Outcome: Met This Shift

## 2022-04-16 NOTE — PROGRESS NOTES
Vascular Surgery Progress Note            PATIENT NAME: Renan Greco     TODAY'S DATE: 4/16/2022, 8:40 AM    SUBJECTIVE:    Pt seen and examined. Ms Jovanny Reveles is POD #4 s/p LLE angiogram, pharmacomechanical thrombectomy, angioplasty and stenting. Patient appears very comfortable this morning, is very sleepy. Does not appear to be having as much pain. OBJECTIVE:   VITALS:  /62   Pulse 88   Temp 97.8 °F (36.6 °C) (Oral)   Resp 16   Ht 5' 4\" (1.626 m)   Wt 112 lb 14 oz (51.2 kg)   SpO2 98%   BMI 19.38 kg/m²      INTAKE/OUTPUT:    No intake or output data in the 24 hours ending 04/16/22 0840    PHYSICAL EXAM  GEN: Alert, awake, oriented, NAD  HEENT: normocephalic, no scleral icterus, moist mucous membranes  CV: regular, rate and rhythm   LUNG: no respiratory distress, no audible wheezing  ABDOMEN: soft, non-distended, non-tender  EXTREMITIES: No edema or clubbing. Left great toe dark discoloration. Right and Left PT/ DP doppler signals present. Data:  CBC with Differential:    Lab Results   Component Value Date    WBC 5.2 04/16/2022    RBC 3.22 04/16/2022    HGB 10.3 04/16/2022    HCT 31.0 04/16/2022     04/16/2022    MCV 96.3 04/16/2022    MCH 32.0 04/16/2022    MCHC 33.2 04/16/2022    RDW 13.1 04/16/2022    LYMPHOPCT 28 04/12/2022    MONOPCT 6 04/12/2022    BASOPCT 1 04/12/2022    MONOSABS 0.75 04/12/2022    LYMPHSABS 3.25 04/12/2022    EOSABS 0.22 04/12/2022    BASOSABS 0.06 04/12/2022     BMP:    Lab Results   Component Value Date     04/16/2022    K 4.4 04/16/2022    CL 94 04/16/2022    CO2 27 04/16/2022    BUN 9 04/16/2022    LABALBU 3.8 04/16/2022    CREATININE 0.96 04/16/2022    CALCIUM 9.3 04/16/2022    GFRAA >60 04/16/2022    LABGLOM >60 04/16/2022    GLUCOSE 358 04/16/2022       Radiology Review:   No new radiology to review this morning. ASSESSMENT   48 yo female POD #4 s/p LLE angiogram, pharmacomechanical thrombectomy, angioplasty and stenting. Plan  1.  Continue Eliquis 10 mg BID (starter dose pack) from 4/14 - 4/21. Then continue Eliquis 5 mg BID. 2. Continue Pletal and Procardia. 3. Continue nitro-bid 2% ointment to left great toe. 4. Toshia hugger PRN. 5. Neurovascular checks every 2 hours. 6. Wean off IV pain medications  7. Additional pain control with roxicodone and gabapentin. 8. Encourage IS and OOB. 9. Podiatry plans for no intervention at this time. 10. Medical management per primary. [address] Okay to discharge from a vascular surgery standpoint.      Sue Menezes,   PGY-3 General Surgery  04/16/22  8:40 AM

## 2022-04-16 NOTE — PROGRESS NOTES
[facility]  Office: [address], DO, Gibbs Kras, DO, Luisa Ng, DO, Ronel Damaso Blood, DO, Lida Osman MD, Antoinette Moses MD, Nohemi Reese MD, Unruly Bonds MD, Morales Pedro MD, Bran Giles MD, Nadine Elliott MD, Louisa Guzman, DO, Nicolette Mittal, DO, Freeman Vivar MD,  Brice Perry, DO, Pranav Diaz MD, Shaquille Peterson MD, Ursula Collins MD, Lucho Tijerina DO, Myron Cardoso MD, Walker Euceda MD, Hammad Peter, [facility], [facility], CNP, Loren Oneil, CNP, Cain Section, CNP, Dima Castillo, CNP, Racquel Daniel, CNP, Alton Lopez PAJordanC, Haris Díaz, DNP, Porsche Husbands, CNP, Martín Silverman, CNP, Heladio Butler, CNP, Uzma Aaron, CNS, Orysia Soda, [facility], Starlett Market, CNP, Neeraj Betters, CNP, Fiona Fitting, [facility]    Progress Note    4/16/2022    1:38 PM    Name:   Renan Greco  MRN:     [id number]     Acct:      [de-identified]   Room:   [address] [place] [address] Day:  5  Admit Date:  4/11/2022  2:59 PM    PCP:   Jeffery Walters  Code Status:  Full Code    Subjective:     C/C: Pain left great toe    Interval History Status:   Patient had  LLE angiogram, pharmacomechanical thrombectomy, angioplasty and stenting  4/12/2022  Still complaining of pain in left foot , off Dilaudid PCA, currently all oral pain medicines and oral nifedipine and Nitropaste on left great toe, pain medicines are being regulated by vascular and they are okay with her discharge  Blood sugars 351-221, A1c 7.4  states has DM 1  Her insulin pump is again off, was given 15 units of Lantus at night  Brief History:   Renan Greco is a 49 y.o. Non- / non  female who presents with No chief complaint on file.    and is admitted to the hospital for the management of <principal problem not specified>.     Evette Ferris is a 49year old female transferred from vascular surgeon office for direct admission for pain and gangrene to the left great toe. She states that she has been seen over the past several months on several occasions for pain and vascular disease to the left leg. She was admitted initially at [facility] and had a left femoral stent placed in March. Following discharge she was found to have clotted the stent and was re-admitted. She continued to have pain after discharge and had her sister drive her to [facility] ED where she had a vascular consult and established care with vascular MD. She went to office appointment today where she states they were unable to find pulses to her left foot. She was transferred for vascular intervention. She states that she has been unable to walk on the foot and pain has been unbearable requiring narcotic pain relief. In addition she has DM1 and HTN. She states she is compliant with home medications and has an insulin pump for DM management. Review of Systems:     Constitutional:  negative for chills, fevers, sweats  Respiratory:  negative for cough, dyspnea on exertion, shortness of breath, wheezing  Cardiovascular:  negative for chest pain, chest pressure/discomfort, lower extremity edema, palpitations  Gastrointestinal:  negative for abdominal pain, constipation, diarrhea, nausea, vomiting  Neurological:  negative for dizziness, headache  + Pain and small area of gangrene left great toe  Medications: Allergies:     Allergies   Allergen Reactions    Latex Rash    Demerol Hcl [Meperidine] Nausea And Vomiting       Current Meds:   Scheduled Meds:    insulin lispro  0-18 Units SubCUTAneous TID WC    insulin lispro  0-9 Units SubCUTAneous Nightly    apixaban  10 mg Oral BID    Followed by   Alexis Samano ON 4/21/2022] apixaban  5 mg Oral BID    sodium chloride flush  5-40 mL IntraVENous 2 times per day    NIFEdipine  30 mg Oral Daily    nitroglycerin  0.5 inch Topical 4 times per day    cilostazol  100 mg Oral BID    aspirin  81 mg Oral Daily    gabapentin  300 mg Oral 3 times per day    buPROPion  150 mg Oral QAM    metoprolol tartrate  12.5 mg Oral BID    sodium chloride flush  5-40 mL IntraVENous 2 times per day     Continuous Infusions:    dextrose      sodium chloride      sodium chloride       PRN Meds: HYDROmorphone, glucose, dextrose, glucagon (rDNA), dextrose, sodium chloride flush, sodium chloride, oxyCODONE, albuterol, sodium chloride flush, sodium chloride, ondansetron **OR** ondansetron, acetaminophen **OR** acetaminophen, polyethylene glycol    Data:     Past Medical History:   has a past medical history of Former smoker, [facility]rene [facility] toe ([facility] HTN (hypertension), [facility]peripheral [facility] and Type 1 diabetes mellitus ([facility] Social History:   reports that she quit smoking about 3 months ago. She quit after 25.00 years of use. She has never used smokeless tobacco. She reports current alcohol use. She reports that she does not use drugs. Family History:   Family History   Problem Relation Age of Onset    Other Mother     Other Father     Diabetes Sister        Vitals:  BP 99/65   Pulse 85   Temp 97.7 °F (36.5 °C) (Oral)   Resp 22   Ht 5' 4\" (1.626 m)   Wt 112 lb 14 oz (51.2 kg)   SpO2 98%   BMI 19.38 kg/m²   Temp (24hrs), Av °F (36.7 °C), Min:97.7 °F (36.5 °C), Max:98.2 °F (36.8 °C)    Recent Labs     22  0256 22  0303 22  0443 22  0811   POCGLU 303* 324* 324* 221*       I/O (24Hr):   No intake or output data in the 24 hours ending 22 1338    Labs:  Hematology:  Recent Labs     22  0552 04/15/22  0439 22  0443   WBC 5.9 5.7 5.2   RBC 3.16* 3.03* 3.22*   HGB 10.3* 9.6* 10.3*   HCT 30.3* 28.8* 31.0*   MCV 95.9 95.0 96.3   MCH 32.6 31.7 32.0   MCHC 34.0 33.3 33.2   RDW 13.0 12.9 13.1    215 250   MPV 10.1 10.0 10.5     Chemistry:  Recent Labs     22  0552 04/15/22  0439 22  0443   * 132* 131*   K 4.2 3.8 4.4    97* 94*   CO2 27 26 27   GLUCOSE 220* 351* 358*   BUN 8 8 9   CREATININE 0.85 0.97* 0.96*   MG 1.6 1.8 1.8   ANIONGAP 7* 9 10   LABGLOM >60 60* >60   GFRAA >60 >60 >60   CALCIUM 9.0 8.6 9.3   PHOS 3.6 3.5 4.2     Recent Labs     04/14/22  0552 04/14/22  1143 04/15/22  0439 04/15/22  0732 04/15/22  2051 04/16/22  0256 04/16/22  0303 04/16/22  0443 04/16/22  0811   LABALBU 3.6  --  3.5  --   --   --   --  3.8  --    URICACID  --  3.4  --   --   --   --   --   --   --    POCGLU  --   --   --  303* 222* 303* 324* 324* 221*     ABG:No results found for: POCPH, PHART, PH, POCPCO2, KJA5NBP, PCO2, POCPO2, PO2ART, PO2, POCHCO3, DTE8ZVD, HCO3, NBEA, PBEA, BEART, BE, THGBART, THB, IGU1HEY, HGIP0HSD, U0TACDUY, O2SAT, FIO2  No results found for: SPECIAL  No results found for: CULTURE    Radiology:  XR CHEST (SINGLE VIEW FRONTAL)    Result Date: 4/11/2022  No evidence of acute cardiopulmonary disease. CTA ABDOMINAL AORTA W BILAT RUNOFF W CONTRAST    Result Date: 4/11/2022  1. Occlusion of the distal left SFA at the level of the stent. The popliteal artery is reconstituted and patent 3 vessel runoff is demonstrated. 2.  Patent right lower extremity runoff. Scattered atheromatous plaque as above. 3.  No aneurysm, dissection or acute aortic abnormality. 4.  Diverticulosis.        Physical Examination:        General appearance:  alert, cooperative and minimal distress due to pain left great toe  Mental Status:  oriented to person, place and time and normal affect  Lungs:  clear to auscultation bilaterally, normal effort  Heart:  regular rate and rhythm, no murmur  Abdomen:  soft, nontender, nondistended, normal bowel sounds, no masses, hepatomegaly, splenomegaly  Extremities:  + Small area of gangrene and redness around left great toe   skin:  no gross lesions, rashes, induration    Assessment:        Hospital Problems           Last Modified POA    Peripheral arterial disease ([facility] 4/11/2022 Yes    Gangrene ([facility] 4/11/2022 Yes    Primary hypertension 4/11/2022 Yes Type 1 diabetes mellitus ([facility] 4/11/2022 Yes      S/p LLE angiogram, pharmacomechanical thrombectomy, angioplasty and stenting. POD # 4    Plan:        1. Transitioned heparin drip to Eliquis with starter pack  2. Oral pain medicines managed by vascular for better pain control  3. Manage DM: Lantus insulin 15 units daily and continue insulin sliding scale as her pump is again off  4. Manage HTN. Continue home meds. 5. DVT prophylaxis: Already on oral anticoagulation  6. GI prophylaxis  7. Discharge planning , cleared by vascular, podiatry has signed off  8. Patient was not hallucinating at the time of my exam  9.  Got message from nurse later that family is having concern about discharge regarding pain and potentially having hallucinations which have not been observed even by nurse taking care of the patient      Abdulkadir Baltazar MD  4/16/2022  1:38 PM

## 2022-04-17 LAB
GLUCOSE BLD-MCNC: 178 MG/DL (ref 65–105)
GLUCOSE BLD-MCNC: 272 MG/DL (ref 65–105)
GLUCOSE BLD-MCNC: 281 MG/DL (ref 65–105)

## 2022-04-17 PROCEDURE — 6370000000 HC RX 637 (ALT 250 FOR IP): Performed by: INTERNAL MEDICINE

## 2022-04-17 PROCEDURE — 99232 SBSQ HOSP IP/OBS MODERATE 35: CPT | Performed by: INTERNAL MEDICINE

## 2022-04-17 PROCEDURE — 2580000003 HC RX 258: Performed by: STUDENT IN AN ORGANIZED HEALTH CARE EDUCATION/TRAINING PROGRAM

## 2022-04-17 PROCEDURE — 6370000000 HC RX 637 (ALT 250 FOR IP): Performed by: STUDENT IN AN ORGANIZED HEALTH CARE EDUCATION/TRAINING PROGRAM

## 2022-04-17 PROCEDURE — 6360000002 HC RX W HCPCS: Performed by: STUDENT IN AN ORGANIZED HEALTH CARE EDUCATION/TRAINING PROGRAM

## 2022-04-17 PROCEDURE — 82947 ASSAY GLUCOSE BLOOD QUANT: CPT

## 2022-04-17 PROCEDURE — 2060000000 HC ICU INTERMEDIATE R&B

## 2022-04-17 RX ORDER — NIFEDIPINE 30 MG/1
30 TABLET, EXTENDED RELEASE ORAL DAILY
Qty: 30 TABLET | Refills: 3 | Status: SHIPPED | OUTPATIENT
Start: 2022-04-17

## 2022-04-17 RX ORDER — CILOSTAZOL 100 MG/1
100 TABLET ORAL 2 TIMES DAILY
Qty: 60 TABLET | Refills: 3 | Status: SHIPPED | OUTPATIENT
Start: 2022-04-17

## 2022-04-17 RX ORDER — OXYCODONE HYDROCHLORIDE 5 MG/1
5 TABLET ORAL EVERY 4 HOURS PRN
Qty: 10 TABLET | Refills: 0 | Status: SHIPPED | OUTPATIENT
Start: 2022-04-17 | End: 2022-04-21

## 2022-04-17 RX ORDER — GABAPENTIN 300 MG/1
300 CAPSULE ORAL EVERY 8 HOURS SCHEDULED
Qty: 21 CAPSULE | Refills: 0 | Status: SHIPPED | OUTPATIENT
Start: 2022-04-17 | End: 2022-04-18 | Stop reason: HOSPADM

## 2022-04-17 RX ORDER — ASPIRIN 81 MG/1
81 TABLET, CHEWABLE ORAL DAILY
Qty: 30 TABLET | Refills: 3 | Status: SHIPPED | OUTPATIENT
Start: 2022-04-17

## 2022-04-17 RX ADMIN — SODIUM CHLORIDE, PRESERVATIVE FREE 10 ML: 5 INJECTION INTRAVENOUS at 13:34

## 2022-04-17 RX ADMIN — OXYCODONE 5 MG: 5 TABLET ORAL at 20:26

## 2022-04-17 RX ADMIN — HYDROMORPHONE HYDROCHLORIDE 0.25 MG: 1 INJECTION, SOLUTION INTRAMUSCULAR; INTRAVENOUS; SUBCUTANEOUS at 08:49

## 2022-04-17 RX ADMIN — ACETAMINOPHEN 650 MG: 325 TABLET ORAL at 02:40

## 2022-04-17 RX ADMIN — GABAPENTIN 300 MG: 300 CAPSULE ORAL at 13:34

## 2022-04-17 RX ADMIN — NIFEDIPINE 30 MG: 30 TABLET, FILM COATED, EXTENDED RELEASE ORAL at 09:08

## 2022-04-17 RX ADMIN — OXYCODONE 5 MG: 5 TABLET ORAL at 15:42

## 2022-04-17 RX ADMIN — BUPROPION HYDROCHLORIDE 150 MG: 150 TABLET, EXTENDED RELEASE ORAL at 08:44

## 2022-04-17 RX ADMIN — ASPIRIN 81 MG: 81 TABLET, CHEWABLE ORAL at 08:45

## 2022-04-17 RX ADMIN — NITROGLYCERIN 0.5 INCH: 20 OINTMENT TOPICAL at 00:28

## 2022-04-17 RX ADMIN — HYDROMORPHONE HYDROCHLORIDE 0.5 MG: 1 INJECTION, SOLUTION INTRAMUSCULAR; INTRAVENOUS; SUBCUTANEOUS at 02:40

## 2022-04-17 RX ADMIN — POLYETHYLENE GLYCOL 3350 17 G: 17 POWDER, FOR SOLUTION ORAL at 09:05

## 2022-04-17 RX ADMIN — SODIUM CHLORIDE, PRESERVATIVE FREE 10 ML: 5 INJECTION INTRAVENOUS at 21:56

## 2022-04-17 RX ADMIN — SODIUM CHLORIDE, PRESERVATIVE FREE 10 ML: 5 INJECTION INTRAVENOUS at 08:45

## 2022-04-17 RX ADMIN — GABAPENTIN 300 MG: 300 CAPSULE ORAL at 21:28

## 2022-04-17 RX ADMIN — OXYCODONE 5 MG: 5 TABLET ORAL at 05:44

## 2022-04-17 RX ADMIN — NITROGLYCERIN 0.5 INCH: 20 OINTMENT TOPICAL at 17:49

## 2022-04-17 RX ADMIN — SODIUM CHLORIDE, PRESERVATIVE FREE 10 ML: 5 INJECTION INTRAVENOUS at 02:40

## 2022-04-17 RX ADMIN — HYDROMORPHONE HYDROCHLORIDE 0.25 MG: 1 INJECTION, SOLUTION INTRAMUSCULAR; INTRAVENOUS; SUBCUTANEOUS at 13:34

## 2022-04-17 RX ADMIN — APIXABAN 10 MG: 5 TABLET, FILM COATED ORAL at 17:50

## 2022-04-17 RX ADMIN — CILOSTAZOL 100 MG: 100 TABLET ORAL at 21:28

## 2022-04-17 RX ADMIN — HYDROMORPHONE HYDROCHLORIDE 0.25 MG: 1 INJECTION, SOLUTION INTRAMUSCULAR; INTRAVENOUS; SUBCUTANEOUS at 21:56

## 2022-04-17 RX ADMIN — HYDROMORPHONE HYDROCHLORIDE 0.25 MG: 1 INJECTION, SOLUTION INTRAMUSCULAR; INTRAVENOUS; SUBCUTANEOUS at 17:54

## 2022-04-17 RX ADMIN — APIXABAN 10 MG: 5 TABLET, FILM COATED ORAL at 06:40

## 2022-04-17 RX ADMIN — CILOSTAZOL 100 MG: 100 TABLET ORAL at 08:44

## 2022-04-17 RX ADMIN — NITROGLYCERIN 0.5 INCH: 20 OINTMENT TOPICAL at 05:44

## 2022-04-17 RX ADMIN — METOPROLOL TARTRATE 12.5 MG: 25 TABLET ORAL at 21:28

## 2022-04-17 RX ADMIN — OXYCODONE 5 MG: 5 TABLET ORAL at 11:44

## 2022-04-17 RX ADMIN — SODIUM CHLORIDE, PRESERVATIVE FREE 10 ML: 5 INJECTION INTRAVENOUS at 17:54

## 2022-04-17 ASSESSMENT — PAIN SCALES - GENERAL
PAINLEVEL_OUTOF10: 10
PAINLEVEL_OUTOF10: 7
PAINLEVEL_OUTOF10: 10
PAINLEVEL_OUTOF10: 9
PAINLEVEL_OUTOF10: 10
PAINLEVEL_OUTOF10: 7
PAINLEVEL_OUTOF10: 9
PAINLEVEL_OUTOF10: 9

## 2022-04-17 ASSESSMENT — PAIN DESCRIPTION - PAIN TYPE: TYPE: CHRONIC PAIN

## 2022-04-17 ASSESSMENT — PAIN DESCRIPTION - LOCATION
LOCATION: FOOT
LOCATION: FOOT

## 2022-04-17 ASSESSMENT — PAIN DESCRIPTION - FREQUENCY
FREQUENCY: CONTINUOUS
FREQUENCY: CONTINUOUS

## 2022-04-17 ASSESSMENT — PAIN DESCRIPTION - DESCRIPTORS: DESCRIPTORS: ACHING

## 2022-04-17 ASSESSMENT — PAIN DESCRIPTION - ORIENTATION
ORIENTATION: LEFT
ORIENTATION: LEFT

## 2022-04-17 NOTE — PROGRESS NOTES
Patient examined at bedside. Palpable pulses of PT, AT and DP of left lower extremity, confirmed with doppler. Patient provided ice pack and reassurance of care plan.      Jayla Reveles MD  PGY-2 General Surgery  1:22 AM 04/17/22

## 2022-04-17 NOTE — PROGRESS NOTES
Vascular Surgery Progress Note            PATIENT NAME: Bess Thornton     TODAY'S DATE: 4/17/2022, 7:33 AM    SUBJECTIVE:    Pt seen and examined. Ms Dori Parisi is POD #5 s/p LLE angiogram, pharmacomechanical thrombectomy, angioplasty and stenting. Peers very comfortable this morning, difficult to fully arouse from sleep. Tolerating p.o. intake. OBJECTIVE:   VITALS:  /71   Pulse 96   Temp 98.2 °F (36.8 °C) (Oral)   Resp 16   Ht 5' 4\" (1.626 m)   Wt 116 lb 13.5 oz (53 kg)   SpO2 94%   BMI 20.06 kg/m²      INTAKE/OUTPUT:    No intake or output data in the 24 hours ending 04/17/22 0733    PHYSICAL EXAM  GEN: Alert, awake, oriented, NAD  HEENT: normocephalic, no scleral icterus, moist mucous membranes  CV: regular, rate and rhythm   LUNG: no respiratory distress, no audible wheezing  ABDOMEN: soft, non-distended, non-tender  EXTREMITIES: No edema or clubbing. Left great toe dark discoloration. Right and Left PT/ DP doppler signals present also faintly palpable. Data:  CBC with Differential:    Lab Results   Component Value Date    WBC 5.2 04/16/2022    RBC 3.22 04/16/2022    HGB 10.3 04/16/2022    HCT 31.0 04/16/2022     04/16/2022    MCV 96.3 04/16/2022    MCH 32.0 04/16/2022    MCHC 33.2 04/16/2022    RDW 13.1 04/16/2022    LYMPHOPCT 28 04/12/2022    MONOPCT 6 04/12/2022    BASOPCT 1 04/12/2022    MONOSABS 0.75 04/12/2022    LYMPHSABS 3.25 04/12/2022    EOSABS 0.22 04/12/2022    BASOSABS 0.06 04/12/2022     BMP:    Lab Results   Component Value Date     04/16/2022    K 4.4 04/16/2022    CL 94 04/16/2022    CO2 27 04/16/2022    BUN 9 04/16/2022    LABALBU 3.8 04/16/2022    CREATININE 0.96 04/16/2022    CALCIUM 9.3 04/16/2022    GFRAA >60 04/16/2022    LABGLOM >60 04/16/2022    GLUCOSE 358 04/16/2022       Radiology Review:   No new radiology to review this morning.      ASSESSMENT   48 yo female POD #5 s/p LLE angiogram, pharmacomechanical thrombectomy, angioplasty and stenting. Plan  1. Continue Eliquis 10 mg BID (starter dose pack) from 4/14 - 4/21. Then continue Eliquis 5 mg BID. 2. Continue Pletal and Procardia. 3. Continue nitro-bid 2% ointment to left great toe. 4. Toshia hugger PRN. 5. Neurovascular checks every 2 hours. 6. Wean off IV pain medications  7. Pain control with roxicodone and gabapentin. 8. Encourage IS and OOB. 9. Podiatry plans for no intervention at this time. 10. Medical management per primary. 6. Okay to discharge from a vascular surgery standpoint.      Jannet Dominguez,   PGY-3 General Surgery  04/17/22  7:33 AM

## 2022-04-17 NOTE — PROGRESS NOTES
Patient is complaining of inner thigh pain in the left leg, vascular surgery made aware of patients concern. Nursing assessment showed  No difference in pulse in left leg or foot from previous assessment before pain started.

## 2022-04-17 NOTE — PROGRESS NOTES
[facility]  Office: [address], DO, Chava Riley, DO, Ethan Payne, DO, Gaby Valdez Blood, DO, Adriel Morgan MD, Luz Feldman MD, Al Velásquez MD, Reuben Barger MD, Alexandra Romo MD, Rajni Clay MD, Ana Saha MD, Katie Amaya, DO, Hermila Kwok, DO, Malathi Cline MD,  Nathanial Apgar, DO, Harman Irvin MD, Ashleigh Melo MD, Baldo Silvestre MD, Casper Singh DO, Jm Freire MD, Tempie Mcburney, MD, Dulce Malloy [facility], [facility], CNP, Jocelyn Grossman, CNP, Juan Luis Veloz, CNP, Kae Dubin, CNP, Janice Norwood, CNP, DARY CrossC, Darryl Flores, [facility], Anup Prabhakar, CNP, Crystal Singleton, CNP, Nell Mcguire, CNP, Devika Key, CNS, Eleanor Britton, [facility], Elena Petersen, CNP, Suellen Coleman, CNP, Kimberly Hussein, [facility]         Aslhey Lyman [place] [address]    Progress Note    2022    11:03 AM    Name:   Katherine Lyons  MRN:     [id number]     Acct:      [de-identified]   Room:   [address] [place] [address] Day:  6  Admit Date:  2022  2:59 PM    PCP:   Rosario Ladd  Code Status:  Full Code    Subjective:     C/C: Pain left great toe    Interval History Status:   Patient had  LLE angiogram, pharmacomechanical thrombectomy, angioplasty and stenting  2022   pain in left foot is getting better with oral pain medicines, off Dilaudid PCA,  pain medicines are being regulated by vascular and they are okay with her discharge  currently on oral nifedipine and Nitropaste on left great toe,  Blood sugars 178, A1c 7.4  states has DM 1  Her insulin pump is working now  Brief History:   Katherine Lyons is a 49 y.o. Non- / non  female who presents with No chief complaint on file. and is admitted to the hospital for the management of <principal problem not specified>.     Emmie Fierro is a 49year old female transferred from vascular surgeon office for direct admission for pain and gangrene to the left great toe.  She states that she has been seen over the past several months on several occasions for pain and vascular disease to the left leg. She was admitted initially at [facility] and had a left femoral stent placed in March. Following discharge she was found to have clotted the stent and was re-admitted. She continued to have pain after discharge and had her sister drive her to [facility] ED where she had a vascular consult and established care with vascular MD. She went to office appointment today where she states they were unable to find pulses to her left foot. She was transferred for vascular intervention. She states that she has been unable to walk on the foot and pain has been unbearable requiring narcotic pain relief. In addition she has DM1 and HTN. She states she is compliant with home medications and has an insulin pump for DM management. Review of Systems:     Constitutional:  negative for chills, fevers, sweats  Respiratory:  negative for cough, dyspnea on exertion, shortness of breath, wheezing  Cardiovascular:  negative for chest pain, chest pressure/discomfort, lower extremity edema, palpitations  Gastrointestinal:  negative for abdominal pain, constipation, diarrhea, nausea, vomiting  Neurological:  negative for dizziness, headache  + Pain and small area of gangrene left great toe-improving  Medications: Allergies:     Allergies   Allergen Reactions    Latex Rash    Demerol Hcl [Meperidine] Nausea And Vomiting       Current Meds:   Scheduled Meds:    insulin lispro  0-18 Units SubCUTAneous TID WC    insulin lispro  0-9 Units SubCUTAneous Nightly    apixaban  10 mg Oral BID    Followed by   Alexis Samano ON 4/21/2022] apixaban  5 mg Oral BID    sodium chloride flush  5-40 mL IntraVENous 2 times per day    NIFEdipine  30 mg Oral Daily    nitroglycerin  0.5 inch Topical 4 times per day    cilostazol  100 mg Oral BID    aspirin  81 mg Oral Daily    gabapentin  300 mg Oral 3 times per day    buPROPion  150 mg Oral QAM    metoprolol tartrate  12.5 mg Oral BID    sodium chloride flush  5-40 mL IntraVENous 2 times per day     Continuous Infusions:    dextrose      sodium chloride      sodium chloride       PRN Meds: HYDROmorphone, glucose, dextrose, glucagon (rDNA), dextrose, sodium chloride flush, sodium chloride, oxyCODONE, albuterol, sodium chloride flush, sodium chloride, ondansetron **OR** ondansetron, acetaminophen **OR** acetaminophen, polyethylene glycol    Data:     Past Medical History:   has a past medical history of Former smoker, Gangrene of toe ([facility] HTN (hypertension), PVD (peripheral vascular disease) ([facility] and Type 1 diabetes mellitus ([facility] Social History:   reports that she quit smoking about 3 months ago. She quit after 25.00 years of use. She has never used smokeless tobacco. She reports current alcohol use. She reports that she does not use drugs. Family History:   Family History   Problem Relation Age of Onset    Other Mother     Other Father     Diabetes Sister        Vitals:  BP 98/71   Pulse 96   Temp 98.2 °F (36.8 °C) (Oral)   Resp 16   Ht 5' 4\" (1.626 m)   Wt 116 lb 13.5 oz (53 kg)   SpO2 94%   BMI 20.06 kg/m²   Temp (24hrs), Av.2 °F (36.8 °C), Min:97.7 °F (36.5 °C), Max:98.7 °F (37.1 °C)    Recent Labs     22  0443 22  0811 22  2326 22  0704   POCGLU 324* 221* 175* 178*       I/O (24Hr):   No intake or output data in the 24 hours ending 22 1103    Labs:  Hematology:  Recent Labs     04/15/22  0439 22  0443   WBC 5.7 5.2   RBC 3.03* 3.22*   HGB 9.6* 10.3*   HCT 28.8* 31.0*   MCV 95.0 96.3   MCH 31.7 32.0   MCHC 33.3 33.2   RDW 12.9 13.1    250   MPV 10.0 10.5     Chemistry:  Recent Labs     04/15/22  0439 22  0443   * 131*   K 3.8 4.4   CL 97* 94*   CO2 26 27   GLUCOSE 351* 358*   BUN 8 9   CREATININE 0.97* 0.96*   MG 1.8 1.8   ANIONGAP 9 10   LABGLOM 60* >60   GFRAA >60 >60   CALCIUM 8.6 9.3 PHOS 3.5 4.2     Recent Labs     04/14/22  1143 04/15/22  0439 04/15/22  0732 04/16/22  0256 04/16/22  0303 04/16/22  0443 04/16/22  0811 04/16/22  2326 04/17/22  0704   LABALBU  --  3.5  --   --   --  3.8  --   --   --    URICACID 3.4  --   --   --   --   --   --   --   --    POCGLU  --   --    < > 303* 324* 324* 221* 175* 178*    < > = values in this interval not displayed. ABG:No results found for: POCPH, PHART, PH, POCPCO2, RFA4RQQ, PCO2, POCPO2, PO2ART, PO2, POCHCO3, CIR4UVQ, HCO3, NBEA, PBEA, BEART, BE, THGBART, THB, JEE8MDJ, BSRZ8LNL, T1NLVPCA, O2SAT, FIO2  No results found for: SPECIAL  No results found for: CULTURE    Radiology:  XR CHEST (SINGLE VIEW FRONTAL)    Result Date: 4/11/2022  No evidence of acute cardiopulmonary disease. CTA ABDOMINAL AORTA W BILAT RUNOFF W CONTRAST    Result Date: 4/11/2022  1. Occlusion of the distal left SFA at the level of the stent. The popliteal artery is reconstituted and patent 3 vessel runoff is demonstrated. 2.  Patent right lower extremity runoff. Scattered atheromatous plaque as above. 3.  No aneurysm, dissection or acute aortic abnormality. 4.  Diverticulosis.        Physical Examination:        General appearance:  alert, cooperative and minimal distress due to pain left great toe  Mental Status:  oriented to person, place and time and normal affect  Lungs:  clear to auscultation bilaterally, normal effort  Heart:  regular rate and rhythm, no murmur  Abdomen:  soft, nontender, nondistended, normal bowel sounds, no masses, hepatomegaly, splenomegaly  Extremities:  + Small area of gangrene and redness around left great toe   skin:  no gross lesions, rashes, induration    Assessment:        Hospital Problems           Last Modified POA    Peripheral arterial disease ([facility] [address] 4/11/2022 Yes    Gangrene ([facility] 4/11/2022 Yes    Primary hypertension 4/11/2022 Yes    Type 1 diabetes mellitus ([facility] 4/11/2022 Yes      S/p LLE angiogram, pharmacomechanical thrombectomy, angioplasty and stenting. POD # 5    Plan:        1. Continue Eliquis with starter pack  2. Oral pain medicines managed by vascular for better pain control  3. Manage DM: Using her own insulin pump  4. Manage HTN. Continue home meds. 5. DVT prophylaxis: Already on oral anticoagulation  6. GI prophylaxis  7.  Discharge planning , cleared by vascular, podiatry has signed off        Jass Rodrigues MD  4/17/2022  11:03 AM

## 2022-04-18 VITALS
BODY MASS INDEX: 19.95 KG/M2 | WEIGHT: 116.84 LBS | OXYGEN SATURATION: 99 % | SYSTOLIC BLOOD PRESSURE: 128 MMHG | DIASTOLIC BLOOD PRESSURE: 84 MMHG | TEMPERATURE: 97.5 F | RESPIRATION RATE: 13 BRPM | HEIGHT: 64 IN | HEART RATE: 77 BPM

## 2022-04-18 LAB
GLUCOSE BLD-MCNC: 135 MG/DL (ref 65–105)
GLUCOSE BLD-MCNC: 157 MG/DL (ref 65–105)

## 2022-04-18 PROCEDURE — 6360000002 HC RX W HCPCS: Performed by: STUDENT IN AN ORGANIZED HEALTH CARE EDUCATION/TRAINING PROGRAM

## 2022-04-18 PROCEDURE — 82947 ASSAY GLUCOSE BLOOD QUANT: CPT

## 2022-04-18 PROCEDURE — 6370000000 HC RX 637 (ALT 250 FOR IP): Performed by: INTERNAL MEDICINE

## 2022-04-18 PROCEDURE — 99232 SBSQ HOSP IP/OBS MODERATE 35: CPT | Performed by: INTERNAL MEDICINE

## 2022-04-18 PROCEDURE — 6360000002 HC RX W HCPCS: Performed by: INTERNAL MEDICINE

## 2022-04-18 PROCEDURE — 2580000003 HC RX 258: Performed by: STUDENT IN AN ORGANIZED HEALTH CARE EDUCATION/TRAINING PROGRAM

## 2022-04-18 PROCEDURE — 6370000000 HC RX 637 (ALT 250 FOR IP): Performed by: STUDENT IN AN ORGANIZED HEALTH CARE EDUCATION/TRAINING PROGRAM

## 2022-04-18 PROCEDURE — 6360000002 HC RX W HCPCS: Performed by: NURSE PRACTITIONER

## 2022-04-18 RX ORDER — CEFUROXIME AXETIL 250 MG/1
250 TABLET ORAL EVERY 12 HOURS SCHEDULED
Status: DISCONTINUED | OUTPATIENT
Start: 2022-04-18 | End: 2022-04-18 | Stop reason: HOSPADM

## 2022-04-18 RX ORDER — OXYCODONE HCL 20 MG/1
20 TABLET, FILM COATED, EXTENDED RELEASE ORAL EVERY 12 HOURS
Qty: 6 EACH | Refills: 0 | Status: SHIPPED | OUTPATIENT
Start: 2022-04-18 | End: 2022-04-21

## 2022-04-18 RX ORDER — GABAPENTIN 600 MG/1
600 TABLET ORAL EVERY 8 HOURS SCHEDULED
Qty: 21 TABLET | Refills: 0 | Status: SHIPPED | OUTPATIENT
Start: 2022-04-18 | End: 2022-04-25

## 2022-04-18 RX ORDER — METHOCARBAMOL 750 MG/1
750 TABLET, FILM COATED ORAL EVERY 6 HOURS
Qty: 40 TABLET | Refills: 0 | Status: SHIPPED | OUTPATIENT
Start: 2022-04-18 | End: 2022-04-28

## 2022-04-18 RX ORDER — METHOCARBAMOL 750 MG/1
750 TABLET, FILM COATED ORAL EVERY 6 HOURS
Status: DISCONTINUED | OUTPATIENT
Start: 2022-04-18 | End: 2022-04-18 | Stop reason: HOSPADM

## 2022-04-18 RX ORDER — OXYCODONE HCL 20 MG/1
20 TABLET, FILM COATED, EXTENDED RELEASE ORAL EVERY 12 HOURS SCHEDULED
Status: DISCONTINUED | OUTPATIENT
Start: 2022-04-18 | End: 2022-04-18 | Stop reason: HOSPADM

## 2022-04-18 RX ORDER — GABAPENTIN 600 MG/1
600 TABLET ORAL EVERY 8 HOURS SCHEDULED
Status: DISCONTINUED | OUTPATIENT
Start: 2022-04-18 | End: 2022-04-18 | Stop reason: HOSPADM

## 2022-04-18 RX ORDER — CEFUROXIME AXETIL 250 MG/1
250 TABLET ORAL EVERY 12 HOURS SCHEDULED
Qty: 14 TABLET | Refills: 0 | Status: SHIPPED | OUTPATIENT
Start: 2022-04-18 | End: 2022-04-25

## 2022-04-18 RX ADMIN — APIXABAN 10 MG: 5 TABLET, FILM COATED ORAL at 06:04

## 2022-04-18 RX ADMIN — ASPIRIN 81 MG: 81 TABLET, CHEWABLE ORAL at 08:40

## 2022-04-18 RX ADMIN — OXYCODONE 5 MG: 5 TABLET ORAL at 01:56

## 2022-04-18 RX ADMIN — BUPROPION HYDROCHLORIDE 150 MG: 150 TABLET, EXTENDED RELEASE ORAL at 08:40

## 2022-04-18 RX ADMIN — GABAPENTIN 600 MG: 600 TABLET ORAL at 14:00

## 2022-04-18 RX ADMIN — METHOCARBAMOL TABLETS 750 MG: 750 TABLET, COATED ORAL at 13:54

## 2022-04-18 RX ADMIN — METHOCARBAMOL TABLETS 750 MG: 750 TABLET, COATED ORAL at 08:44

## 2022-04-18 RX ADMIN — NITROGLYCERIN 0.5 INCH: 20 OINTMENT TOPICAL at 12:13

## 2022-04-18 RX ADMIN — METOPROLOL TARTRATE 12.5 MG: 25 TABLET ORAL at 08:40

## 2022-04-18 RX ADMIN — NITROGLYCERIN 0.5 INCH: 20 OINTMENT TOPICAL at 06:04

## 2022-04-18 RX ADMIN — OXYCODONE 5 MG: 5 TABLET ORAL at 14:49

## 2022-04-18 RX ADMIN — CEFUROXIME AXETIL 250 MG: 250 TABLET, FILM COATED ORAL at 10:27

## 2022-04-18 RX ADMIN — NIFEDIPINE 30 MG: 30 TABLET, FILM COATED, EXTENDED RELEASE ORAL at 08:40

## 2022-04-18 RX ADMIN — OXYCODONE HYDROCHLORIDE 20 MG: 20 TABLET, FILM COATED, EXTENDED RELEASE ORAL at 10:26

## 2022-04-18 RX ADMIN — CILOSTAZOL 100 MG: 100 TABLET ORAL at 08:40

## 2022-04-18 RX ADMIN — HYDROMORPHONE HYDROCHLORIDE 0.25 MG: 1 INJECTION, SOLUTION INTRAMUSCULAR; INTRAVENOUS; SUBCUTANEOUS at 04:15

## 2022-04-18 RX ADMIN — OXYCODONE 5 MG: 5 TABLET ORAL at 06:04

## 2022-04-18 RX ADMIN — HYDROMORPHONE HYDROCHLORIDE 0.5 MG: 1 INJECTION, SOLUTION INTRAMUSCULAR; INTRAVENOUS; SUBCUTANEOUS at 00:11

## 2022-04-18 RX ADMIN — METHOCARBAMOL TABLETS 750 MG: 750 TABLET, COATED ORAL at 01:56

## 2022-04-18 RX ADMIN — NITROGLYCERIN 0.5 INCH: 20 OINTMENT TOPICAL at 00:16

## 2022-04-18 RX ADMIN — HYDROMORPHONE HYDROCHLORIDE 1 MG: 1 INJECTION, SOLUTION INTRAMUSCULAR; INTRAVENOUS; SUBCUTANEOUS at 09:52

## 2022-04-18 RX ADMIN — GABAPENTIN 300 MG: 300 CAPSULE ORAL at 06:04

## 2022-04-18 RX ADMIN — SODIUM CHLORIDE, PRESERVATIVE FREE 10 ML: 5 INJECTION INTRAVENOUS at 08:40

## 2022-04-18 ASSESSMENT — PAIN SCALES - GENERAL
PAINLEVEL_OUTOF10: 8
PAINLEVEL_OUTOF10: 8
PAINLEVEL_OUTOF10: 10
PAINLEVEL_OUTOF10: 8
PAINLEVEL_OUTOF10: 10
PAINLEVEL_OUTOF10: 7
PAINLEVEL_OUTOF10: 9
PAINLEVEL_OUTOF10: 8
PAINLEVEL_OUTOF10: 9

## 2022-04-18 ASSESSMENT — PAIN DESCRIPTION - DESCRIPTORS: DESCRIPTORS: ACHING

## 2022-04-18 ASSESSMENT — PAIN DESCRIPTION - LOCATION: LOCATION: FOOT

## 2022-04-18 ASSESSMENT — PAIN DESCRIPTION - PAIN TYPE: TYPE: CHRONIC PAIN

## 2022-04-18 NOTE — CARE COORDINATION
Transitional planning-PS Dr. Pedro Victor with vascular. He states OK for patient to be discharged. Called PT to see if they could see her first today. Dr. Shaan Chung d/c'd discharge. Will re evaluate this afternoon. 200 talked with patient. Plan is to go home with her daughter Kaleb Adams be home in a couple of days. Will have her sister Dao Smith come and stay with her. Has ride.

## 2022-04-18 NOTE — PROGRESS NOTES
Vascular Surgery Progress Note            PATIENT NAME: Jose E Chowdary     TODAY'S DATE: 4/18/2022, 8:33 AM    SUBJECTIVE:    Pt seen and examined. Ms Milly Bradley is POD #6 s/p LLE angiogram, pharmacomechanical thrombectomy, angioplasty and stenting. Continues to endorse some pain in her left great toe. States that at times the pain meds don't help. Taking in some PO. No nausea this AM.      OBJECTIVE:   VITALS:  /78   Pulse 94   Temp 98.1 °F (36.7 °C) (Oral)   Resp 16   Ht 5' 4\" (1.626 m)   Wt 116 lb 13.5 oz (53 kg)   SpO2 94%   BMI 20.06 kg/m²      INTAKE/OUTPUT:      Intake/Output Summary (Last 24 hours) at 4/18/2022 5714  Last data filed at 4/17/2022 2018  Gross per 24 hour   Intake 770 ml   Output --   Net 770 ml       PHYSICAL EXAM  GEN: Alert, awake, oriented, NAD  HEENT: normocephalic, no scleral icterus, moist mucous membranes  CV: regular, rate and rhythm   LUNG: no respiratory distress, no audible wheezing  ABDOMEN: soft, non-distended, non-tender  EXTREMITIES: No edema or clubbing. Left great toe dark discoloration. Right and Left PT/ DP doppler signals present also faintly palpable.      Data:  CBC with Differential:    Lab Results   Component Value Date    WBC 5.2 04/16/2022    RBC 3.22 04/16/2022    HGB 10.3 04/16/2022    HCT 31.0 04/16/2022     04/16/2022    MCV 96.3 04/16/2022    MCH 32.0 04/16/2022    MCHC 33.2 04/16/2022    RDW 13.1 04/16/2022    LYMPHOPCT 28 04/12/2022    MONOPCT 6 04/12/2022    BASOPCT 1 04/12/2022    MONOSABS 0.75 04/12/2022    LYMPHSABS 3.25 04/12/2022    EOSABS 0.22 04/12/2022    BASOSABS 0.06 04/12/2022     BMP:    Lab Results   Component Value Date     04/16/2022    K 4.4 04/16/2022    CL 94 04/16/2022    CO2 27 04/16/2022    BUN 9 04/16/2022    LABALBU 3.8 04/16/2022    CREATININE 0.96 04/16/2022    CALCIUM 9.3 04/16/2022    GFRAA >60 04/16/2022    LABGLOM >60 04/16/2022    GLUCOSE 358 04/16/2022       Radiology Review:   No new radiology to review this morning. ASSESSMENT   50 yo female POD #6 s/p LLE angiogram, pharmacomechanical thrombectomy, angioplasty and stenting. Plan  1. Continue Eliquis 10 mg BID (starter dose pack) from 4/14 - 4/21. Then continue Eliquis 5 mg BID. 2. Continue Pletal and Procardia. 3. Continue nitro-bid 2% ointment to left great toe. 4. Toshia hugger PRN. 5. Neurovascular checks every 2 hours. 6. DC IV pain meds  7. Pain control with roxicodone and gabapentin. 8. Encourage IS and OOB. 9. Podiatry plans for no intervention at this time. 10. Medical management per primary. 11. Had another discussion with her today, once again discussed that unfortunately she has a component of microvascular disease that we are not able to treat surgically  12. Okay to discharge from a vascular surgery standpoint.   13. Vascular surgery to sign off please call back with any further questions    Alva Felder,   PGY-3 General Surgery  04/18/22  8:33 AM

## 2022-04-18 NOTE — PROGRESS NOTES
[facility]  Office: [address], DO, Ayleen Don, DO, Alyce Bnada, DO, Pawelisaias Red Blood, DO, Kirby Izaguirre MD, José Luis Toribio MD, Blair King MD, Kristen Godwin MD, Ori Dutta MD, Agatha Verma MD, Dora Christianson MD, Jennifer Crandall, DO, Nicolas Carson, DO, Lucila Guzman MD,  Leodan May, DO, Chong Fisher MD, Winnie Sullivan MD, Mynor Herrera MD, Arnulfo Doe DO, Corinna Joseph MD, Alonso Evangelista MD, Isrealfernando Brewer, [facility], [facility], CNP, Javier Hooker, CNP, Teri Albert, CNP, Neil Schultz, CNP, Nolberto Robertson, CNP, DARY VillegasC, Faith Peguero, DNP, Tessie Stanton, [facility], Maribell Evans, CNP, Ori Dunn, CNP, Rosita Perkins, CNS, Idris Amezquita, [facility], Luis Lawler, CNP, Sonja Levine, CNP, Shaina Teixeira, [facility]    Progress Note    4/18/2022    7:59 AM    Name:   Talha Saleem  MRN:     [id number]     Acct:      [de-identified]   Room:   [address] [place] [address] Day:  7  Admit Date:  4/11/2022  2:59 PM    PCP:   Tanmay Welch  Code Status:  Full Code    Subjective:     C/C: Pain left great toe    Interval History Status:   Patient had  LLE angiogram, pharmacomechanical thrombectomy, angioplasty and stenting  4/12/2022  Patient was discharged yesterday however she was kept with the nurse as she wanted vascular to evaluate the patient again, apparently vascular saw the patient today morning. Patient still complains of moderate to severe pain in the left great toe, there is slightly increased redness on the toe  currently on oral nifedipine and Nitropaste on left great toe,  Blood sugars 178, A1c 7.4  states has DM 1  Her insulin pump is working now  Brief History:   Talha Saleem is a 49 y.o. Non- / non  female who presents with No chief complaint on file.    and is admitted to the hospital for the management of <principal problem not specified>.     [facility] is a  year old female transferred from vascular surgeon office for direct admission for pain and gangrene to the left great toe. She states that she has been seen over the past several months on several occasions for pain and vascular disease to the left leg. She was admitted initially at [facility] and had a left femoral stent placed in March. Following discharge she was found to have clotted the stent and was re-admitted. She continued to have pain after discharge and had her sister drive her to [facility] ED where she had a vascular consult and established care with vascular MD. She went to office appointment today where she states they were unable to find pulses to her left foot. She was transferred for vascular intervention. She states that she has been unable to walk on the foot and pain has been unbearable requiring narcotic pain relief. In addition she has DM1 and HTN. She states she is compliant with home medications and has an insulin pump for DM management. Review of Systems:     Constitutional:  negative for chills, fevers, sweats  Respiratory:  negative for cough, dyspnea on exertion, shortness of breath, wheezing  Cardiovascular:  negative for chest pain, chest pressure/discomfort, lower extremity edema, palpitations  Gastrointestinal:  negative for abdominal pain, constipation, diarrhea, nausea, vomiting  Neurological:  negative for dizziness, headache  + Pain and small area of gangrene left great toe  Medications: Allergies:     Allergies   Allergen Reactions    Latex Rash    Demerol Hcl [Meperidine] Nausea And Vomiting       Current Meds:   Scheduled Meds:    methocarbamol  750 mg Oral Q6H    insulin lispro  0-18 Units SubCUTAneous TID WC    insulin lispro  0-9 Units SubCUTAneous Nightly    apixaban  10 mg Oral BID    Followed by   Martin Leaks ON 4/21/2022] apixaban  5 mg Oral BID    sodium chloride flush  5-40 mL IntraVENous 2 times per day    NIFEdipine  30 mg Oral Daily    nitroglycerin  0.5 inch Topical 4 times per day    cilostazol  100 mg Oral BID    aspirin  81 mg Oral Daily    gabapentin  300 mg Oral 3 times per day    buPROPion  150 mg Oral QAM    metoprolol tartrate  12.5 mg Oral BID    sodium chloride flush  5-40 mL IntraVENous 2 times per day     Continuous Infusions:    dextrose      sodium chloride      sodium chloride       PRN Meds: HYDROmorphone, glucose, dextrose, glucagon (rDNA), dextrose, sodium chloride flush, sodium chloride, oxyCODONE, albuterol, sodium chloride flush, sodium chloride, ondansetron **OR** ondansetron, acetaminophen **OR** acetaminophen, polyethylene glycol    Data:     Past Medical History:   has a past medical history of Former smoker, Gangrene of toe ([facility] [address] HTN (hypertension), [facility]ipheral vascular disease) [facility] and Type 1 diabetes mellitus ([facility] Social History:   reports that she quit smoking about 3 months ago. She quit after 25.00 years of use. She has never used smokeless tobacco. She reports current alcohol use. She reports that she does not use drugs. Family History:   Family History   Problem Relation Age of Onset    Other Mother     Other Father     Diabetes Sister        Vitals:  /78   Pulse 94   Temp 98.1 °F (36.7 °C) (Oral)   Resp 16   Ht 5' 4\" (1.626 m)   Wt 116 lb 13.5 oz (53 kg)   SpO2 94%   BMI 20.06 kg/m²   Temp (24hrs), Av.2 °F (36.8 °C), Min:98 °F (36.7 °C), Max:98.3 °F (36.8 °C)    Recent Labs     22  2326 22  0704 22  1643 22  1926   POCGLU 175* 178* 272* 281*       I/O (24Hr):     Intake/Output Summary (Last 24 hours) at 2022 0759  Last data filed at 2022  Gross per 24 hour   Intake 770 ml   Output --   Net 770 ml       Labs:  Hematology:  Recent Labs     22  0443   WBC 5.2   RBC 3.22*   HGB 10.3*   HCT 31.0*   MCV 96.3   MCH 32.0   MCHC 33.2   RDW 13.1      MPV 10.5     Chemistry:  Recent Labs     22  0443   *   K 4.4   CL 94*   CO2 27   GLUCOSE 358*   BUN 9   CREATININE 0.96*   MG 1.8   ANIONGAP 10   LABGLOM >60   GFRAA >60   CALCIUM 9.3   PHOS 4.2     Recent Labs     04/16/22  0443 04/16/22  0811 04/16/22  2326 04/17/22  0704 04/17/22  1643 04/17/22  1926   LABALBU 3.8  --   --   --   --   --    POCGLU 324* 221* 175* 178* 272* 281*     ABG:No results found for: POCPH, PHART, PH, POCPCO2, JLB6YTA, PCO2, POCPO2, PO2ART, PO2, POCHCO3, WGX3KSE, HCO3, NBEA, PBEA, BEART, BE, THGBART, THB, RUZ4BDU, SEBR0LJE, X9IFVRLS, O2SAT, FIO2  No results found for: SPECIAL  No results found for: CULTURE    Radiology:  XR CHEST (SINGLE VIEW FRONTAL)    Result Date: 4/11/2022  No evidence of acute cardiopulmonary disease. CTA ABDOMINAL AORTA W BILAT RUNOFF W CONTRAST    Result Date: 4/11/2022  1. Occlusion of the distal left SFA at the level of the stent. The popliteal artery is reconstituted and patent 3 vessel runoff is demonstrated. 2.  Patent right lower extremity runoff. Scattered atheromatous plaque as above. 3.  No aneurysm, dissection or acute aortic abnormality. 4.  Diverticulosis. Physical Examination:        General appearance:  alert, cooperative and minimal distress due to pain left great toe  Mental Status:  oriented to person, place and time and normal affect  Lungs:  clear to auscultation bilaterally, normal effort  Heart:  regular rate and rhythm, no murmur  Abdomen:  soft, nontender, nondistended, normal bowel sounds, no masses, hepatomegaly, splenomegaly  Extremities:  + Small area of gangrene and redness around left great toe   skin:  no gross lesions, rashes, induration    Assessment:        Hospital Problems           Last Modified POA    Peripheral arterial disease ([facility] 4/11/2022 Yes    Gangrene ([address] 4/11/2022 Yes    Primary hypertension 4/11/2022 Yes    Type 1 diabetes mellitus ([address] 4/11/2022 Yes      S/p LLE angiogram, pharmacomechanical thrombectomy, angioplasty and stenting. POD # 6    Plan:        1.  Continue Eliquis with starter pack  2. Oral pain medicines managed by vascular for better pain control, will discharge on limited prescription of OxyContin 20 mg twice daily and Roxicodone as needed  3. She needs to follow-up with pain clinic/PCP as outpatient for further pain medicines  4. Manage DM: Using her own insulin pump  5. Manage HTN. Continue home meds. 6. DVT prophylaxis: Already on oral anticoagulation  7. GI prophylaxis  8. Start on oral Ceftin due to increased redness on left great toe suggestive of possible getting infection.   9. Discharge home, new discharge order has been placed again        Thu Garner MD  4/18/2022  7:59 AM

## 2022-04-18 NOTE — PROGRESS NOTES
Physical Therapy        Physical Therapy Cancel Note      DATE: 2022    NAME: Ginny Stuart  MRN: [id number]   : 1965      Patient not seen this date for Physical Therapy due to:    Patient Declined: pt declined x 2, requesting to eat breakfast then reporting too much pain for therapy evaluation. RN notified and aware. PT will check back 22.        Electronically signed by Orlin Hollins PT on 2022 at 9:45 AM

## 2022-04-18 NOTE — PROGRESS NOTES
CLINICAL PHARMACY NOTE: MEDS TO BEDS    Total # of Prescriptions Filled: 10   The following medications were delivered to the patient:  · Nitro-bid ointment  · Aspirin 81 mg   · Oxycodone 5 mg  · cilostazol 50 mg  · Nifedipine ER 30mg  · Cefuroxime 250 mg  · Eliquis 5mg  · Methocarbamol 750mg  · Gabapentin 600mg  · Oxycodone 20mg    Additional Documentation: delivered medications to patient in room 540 4/48/2022 @ 6151.  No copay, 1 visitor in room

## 2022-04-18 NOTE — CARE COORDINATION
Discharge [facility] Case Management Department  Written by: Kristy Kasper RN    Patient Name: Ginny Stuart  Attending Provider: Stacey Mcgill MD  Admit Date: 2022  2:59 PM  MRN: [id number]  Account: [de-identified]                     : 1965  Discharge Date:  2022      Disposition: home    Kristy Kasper RN

## 2022-04-18 NOTE — PROGRESS NOTES
Occupational [address] [place]  Occupational Therapy Not Seen Note    DATE: 2022    NAME: Mirta Ordaz  MRN: [id number]   : 1965      Patient not seen this date for Occupational Therapy due to:    Patient Declined:  Pt politely declined. OT will re-attempt at later date as appropriate.         Electronically signed by MORENA Alfred OTR/L on 2022 at 2:17 PM

## 2022-05-12 NOTE — FLOWSHEET NOTE
Advance Care Planning   ACP discussion was held with the patient during this visit. Patient does not have an advance directive, information provided.      IV removed, instructions provided, pt left floor via wheelchair with all belongings

## 2023-08-30 ENCOUNTER — OFFICE VISIT (OUTPATIENT)
Dept: FAMILY MEDICINE CLINIC | Age: 58
End: 2023-08-30
Payer: COMMERCIAL

## 2023-08-30 VITALS
WEIGHT: 134.4 LBS | HEIGHT: 64 IN | HEART RATE: 92 BPM | BODY MASS INDEX: 22.94 KG/M2 | TEMPERATURE: 96.8 F | OXYGEN SATURATION: 99 % | DIASTOLIC BLOOD PRESSURE: 86 MMHG | SYSTOLIC BLOOD PRESSURE: 132 MMHG

## 2023-08-30 DIAGNOSIS — I73.9 PERIPHERAL ARTERIAL DISEASE (HCC): ICD-10-CM

## 2023-08-30 DIAGNOSIS — H02.203 LAGOPHTHALMOS OF EYELIDS OF BOTH EYES, UNSPECIFIED EYELID, UNSPECIFIED LAGOPHTHALMOS TYPE: Primary | ICD-10-CM

## 2023-08-30 DIAGNOSIS — J34.89 SORE IN NOSE: ICD-10-CM

## 2023-08-30 DIAGNOSIS — E34.9 HORMONE IMBALANCE: ICD-10-CM

## 2023-08-30 DIAGNOSIS — F32.1 MODERATE MAJOR DEPRESSION (HCC): ICD-10-CM

## 2023-08-30 DIAGNOSIS — I10 PRIMARY HYPERTENSION: ICD-10-CM

## 2023-08-30 DIAGNOSIS — E10.22 TYPE 1 DIABETES MELLITUS WITH DIABETIC CHRONIC KIDNEY DISEASE, UNSPECIFIED CKD STAGE (HCC): ICD-10-CM

## 2023-08-30 DIAGNOSIS — H02.206 LAGOPHTHALMOS OF EYELIDS OF BOTH EYES, UNSPECIFIED EYELID, UNSPECIFIED LAGOPHTHALMOS TYPE: Primary | ICD-10-CM

## 2023-08-30 PROBLEM — I96 GANGRENE (HCC): Status: RESOLVED | Noted: 2022-04-11 | Resolved: 2023-08-30

## 2023-08-30 PROCEDURE — 3079F DIAST BP 80-89 MM HG: CPT | Performed by: FAMILY MEDICINE

## 2023-08-30 PROCEDURE — 3051F HG A1C>EQUAL 7.0%<8.0%: CPT | Performed by: FAMILY MEDICINE

## 2023-08-30 PROCEDURE — 3075F SYST BP GE 130 - 139MM HG: CPT | Performed by: FAMILY MEDICINE

## 2023-08-30 PROCEDURE — 99214 OFFICE O/P EST MOD 30 MIN: CPT | Performed by: FAMILY MEDICINE

## 2023-08-30 RX ORDER — ALIROCUMAB 75 MG/ML
INJECTION, SOLUTION SUBCUTANEOUS
COMMUNITY
Start: 2023-05-04

## 2023-08-30 RX ORDER — PROGESTERONE 200 MG/1
200 CAPSULE ORAL NIGHTLY
Qty: 90 CAPSULE | Refills: 0 | Status: SHIPPED | OUTPATIENT
Start: 2023-08-30 | End: 2023-11-28

## 2023-08-30 RX ORDER — ACETAMINOPHEN 160 MG
2000 TABLET,DISINTEGRATING ORAL DAILY
Qty: 90 CAPSULE | Refills: 3 | Status: SHIPPED | OUTPATIENT
Start: 2023-08-30

## 2023-08-30 RX ORDER — INSULIN LISPRO 100 [IU]/ML
INJECTION, SOLUTION INTRAVENOUS; SUBCUTANEOUS
COMMUNITY

## 2023-08-30 RX ORDER — AMLODIPINE BESYLATE 10 MG/1
10 TABLET ORAL DAILY
COMMUNITY
Start: 2021-04-12

## 2023-08-30 RX ORDER — LEVOTHYROXINE SODIUM 0.05 MG/1
50 TABLET ORAL DAILY
COMMUNITY
Start: 2023-08-23 | End: 2023-08-30 | Stop reason: SDUPTHER

## 2023-08-30 RX ORDER — INSULIN ASPART 100 [IU]/ML
INJECTION, SOLUTION INTRAVENOUS; SUBCUTANEOUS
COMMUNITY
Start: 2023-08-23

## 2023-08-30 RX ORDER — INSULIN ASPART 100 [IU]/ML
INJECTION, SOLUTION INTRAVENOUS; SUBCUTANEOUS
COMMUNITY
Start: 2010-02-22

## 2023-08-30 RX ORDER — VALACYCLOVIR HYDROCHLORIDE 1 G/1
TABLET, FILM COATED ORAL
COMMUNITY
Start: 2021-01-18

## 2023-08-30 RX ORDER — FENOFIBRATE 145 MG/1
TABLET, COATED ORAL
COMMUNITY
Start: 2022-03-10

## 2023-08-30 RX ORDER — TERBINAFINE HYDROCHLORIDE 250 MG/1
250 TABLET ORAL DAILY
Qty: 90 TABLET | Refills: 1 | Status: SHIPPED | OUTPATIENT
Start: 2023-08-30

## 2023-08-30 RX ORDER — KETOCONAZOLE 20 MG/ML
SHAMPOO TOPICAL
COMMUNITY
Start: 2023-06-26 | End: 2023-08-30 | Stop reason: SDUPTHER

## 2023-08-30 RX ORDER — LEVOTHYROXINE SODIUM 0.05 MG/1
50 TABLET ORAL DAILY
Qty: 90 TABLET | Refills: 3 | Status: SHIPPED | OUTPATIENT
Start: 2023-08-30

## 2023-08-30 RX ORDER — CELECOXIB 100 MG/1
100 CAPSULE ORAL 2 TIMES DAILY
COMMUNITY
Start: 2023-05-25 | End: 2023-08-30 | Stop reason: SDUPTHER

## 2023-08-30 RX ORDER — GABAPENTIN 300 MG/1
300 CAPSULE ORAL
COMMUNITY
Start: 2023-05-25

## 2023-08-30 RX ORDER — KETOCONAZOLE 20 MG/ML
SHAMPOO TOPICAL
Qty: 120 ML | Refills: 1 | Status: SHIPPED | OUTPATIENT
Start: 2023-08-30

## 2023-08-30 RX ORDER — TERBINAFINE HYDROCHLORIDE 250 MG/1
TABLET ORAL
COMMUNITY
Start: 2021-04-15 | End: 2023-08-30 | Stop reason: SDUPTHER

## 2023-08-30 RX ORDER — DESVENLAFAXINE SUCCINATE 50 MG/1
1 TABLET, EXTENDED RELEASE ORAL DAILY
COMMUNITY

## 2023-08-30 RX ORDER — ONDANSETRON 4 MG/1
TABLET, ORALLY DISINTEGRATING ORAL
COMMUNITY
Start: 2023-06-04

## 2023-08-30 RX ORDER — CALCIUM CITRATE/VITAMIN D3 200MG-6.25
TABLET ORAL
COMMUNITY
Start: 2023-07-31

## 2023-08-30 RX ORDER — CELECOXIB 100 MG/1
100 CAPSULE ORAL 2 TIMES DAILY
Qty: 180 CAPSULE | Refills: 3 | Status: SHIPPED | OUTPATIENT
Start: 2023-08-30

## 2023-08-30 RX ORDER — SULFAMETHOXAZOLE AND TRIMETHOPRIM 800; 160 MG/1; MG/1
1 TABLET ORAL 2 TIMES DAILY
Qty: 14 TABLET | Refills: 0 | Status: SHIPPED | OUTPATIENT
Start: 2023-08-30 | End: 2023-09-06

## 2023-08-30 RX ORDER — LORAZEPAM 0.5 MG/1
0.5 TABLET ORAL NIGHTLY
COMMUNITY
Start: 2022-05-24

## 2023-08-30 RX ORDER — CETIRIZINE HYDROCHLORIDE 10 MG/1
10 TABLET ORAL DAILY
COMMUNITY
Start: 2023-05-20

## 2023-08-30 RX ORDER — CLOPIDOGREL BISULFATE 75 MG/1
TABLET ORAL EVERY 24 HOURS
COMMUNITY
Start: 2023-05-19

## 2023-08-30 RX ORDER — FLUTICASONE PROPIONATE 50 MCG
SPRAY, SUSPENSION (ML) NASAL
COMMUNITY
Start: 2023-05-26

## 2023-08-30 RX ORDER — BUPROPION HYDROCHLORIDE 150 MG/1
TABLET ORAL
COMMUNITY
Start: 2022-03-09

## 2023-08-30 NOTE — PROGRESS NOTES
Concha Maya 1965 is a 56 y.o. female who presents today with:  Chief Complaint   Patient presents with    Depression     She has a chronic history of anxiety and depression. Moods stable. She follows with Janelle Clark CNP for [address] [facility] concerns. Surgical Clearance     States she is scheduled for an upcoming eye surgery and is requesting surgical clearance. Hypertension     She does not monitor her BP at home. Denies chest pains, palpitations, SOB, dizziness, lightheadedness, headaches or edema. Diabetes     She monitors her blood sugars at home several times daily and reports it is always up and down. A1C was 7.0 about 1 months ago at her neurologist's office. DM eye exam completed within the last year with Dr. Aby Tucker. Skin Problem     C/O sores inside of her nose bilaterally. She has had these for several months - since last winter. States they will not heal and if they do start to heal, they quickly return. C/O bleeding and discharge from the sores. States they are very painful. She has tried using OTC creams/ointments with minimal effectiveness. Depression  Hypertension    Diabetes    Skin Problem    I have reviewed HPI and agree with above. She is concerned her hormone issues are contributing to her memory loss and her moods. She was taken off of her Premarin and progesterone when she was developing blockage in her arteries of her legs. However at that time she was smoking and taking the hormones. She would like to go back on the hormones. She is not smoking at all. She is not vaping or using oral tobacco either. She continues to be on Plavix and aspirin for her peripheral artery disease. Her blood pressures been's well controlled. She has not had any further issues with her arteries. The sores seem to come and go. Her blood sugars are pretty good for her. A1c is 7. She continues to use the pump with basal plus bolus.     Review of Systems

## 2023-10-10 ASSESSMENT — PATIENT HEALTH QUESTIONNAIRE - PHQ9
SUM OF ALL RESPONSES TO PHQ QUESTIONS 1-9: 6
3. TROUBLE FALLING OR STAYING ASLEEP: SEVERAL DAYS
SUM OF ALL RESPONSES TO PHQ QUESTIONS 1-9: 6
6. FEELING BAD ABOUT YOURSELF - OR THAT YOU ARE A FAILURE OR HAVE LET YOURSELF OR YOUR FAMILY DOWN: NOT AT ALL
SUM OF ALL RESPONSES TO PHQ QUESTIONS 1-9: 6
4. FEELING TIRED OR HAVING LITTLE ENERGY: 1
8. MOVING OR SPEAKING SO SLOWLY THAT OTHER PEOPLE COULD HAVE NOTICED. OR THE OPPOSITE, BEING SO FIGETY OR RESTLESS THAT YOU HAVE BEEN MOVING AROUND A LOT MORE THAN USUAL: 0
9. THOUGHTS THAT YOU WOULD BE BETTER OFF DEAD, OR OF HURTING YOURSELF: NOT AT ALL
2. FEELING DOWN, DEPRESSED OR HOPELESS: SEVERAL DAYS
SUM OF ALL RESPONSES TO PHQ9 QUESTIONS 1 & 2: 2
2. FEELING DOWN, DEPRESSED OR HOPELESS: 1
5. POOR APPETITE OR OVEREATING: SEVERAL DAYS
9. THOUGHTS THAT YOU WOULD BE BETTER OFF DEAD, OR OF HURTING YOURSELF: 0
SUM OF ALL RESPONSES TO PHQ QUESTIONS 1-9: 6
4. FEELING TIRED OR HAVING LITTLE ENERGY: SEVERAL DAYS
7. TROUBLE CONCENTRATING ON THINGS, SUCH AS READING THE NEWSPAPER OR WATCHING TELEVISION: SEVERAL DAYS
10. IF YOU CHECKED OFF ANY PROBLEMS, HOW DIFFICULT HAVE THESE PROBLEMS MADE IT FOR YOU TO DO YOUR WORK, TAKE CARE OF THINGS AT HOME, OR GET ALONG WITH OTHER PEOPLE: 1
3. TROUBLE FALLING OR STAYING ASLEEP: 1
1. LITTLE INTEREST OR PLEASURE IN DOING THINGS: 1
5. POOR APPETITE OR OVEREATING: 1
6. FEELING BAD ABOUT YOURSELF - OR THAT YOU ARE A FAILURE OR HAVE LET YOURSELF OR YOUR FAMILY DOWN: 0
10. IF YOU CHECKED OFF ANY PROBLEMS, HOW DIFFICULT HAVE THESE PROBLEMS MADE IT FOR YOU TO DO YOUR WORK, TAKE CARE OF THINGS AT HOME, OR GET ALONG WITH OTHER PEOPLE: SOMEWHAT DIFFICULT
8. MOVING OR SPEAKING SO SLOWLY THAT OTHER PEOPLE COULD HAVE NOTICED. OR THE OPPOSITE - BEING SO FIDGETY OR RESTLESS THAT YOU HAVE BEEN MOVING AROUND A LOT MORE THAN USUAL: NOT AT ALL
7. TROUBLE CONCENTRATING ON THINGS, SUCH AS READING THE NEWSPAPER OR WATCHING TELEVISION: 1
SUM OF ALL RESPONSES TO PHQ QUESTIONS 1-9: 6
1. LITTLE INTEREST OR PLEASURE IN DOING THINGS: SEVERAL DAYS

## 2023-10-11 ENCOUNTER — OFFICE VISIT (OUTPATIENT)
Dept: FAMILY MEDICINE CLINIC | Age: 58
End: 2023-10-11
Payer: COMMERCIAL

## 2023-10-11 VITALS
OXYGEN SATURATION: 98 % | BODY MASS INDEX: 21.95 KG/M2 | WEIGHT: 128.6 LBS | HEIGHT: 64 IN | TEMPERATURE: 97.9 F | SYSTOLIC BLOOD PRESSURE: 124 MMHG | HEART RATE: 78 BPM | DIASTOLIC BLOOD PRESSURE: 80 MMHG

## 2023-10-11 DIAGNOSIS — H02.203 LAGOPHTHALMOS OF EYELIDS OF BOTH EYES, UNSPECIFIED EYELID, UNSPECIFIED LAGOPHTHALMOS TYPE: Primary | ICD-10-CM

## 2023-10-11 DIAGNOSIS — J40 BRONCHITIS: ICD-10-CM

## 2023-10-11 DIAGNOSIS — H02.206 LAGOPHTHALMOS OF EYELIDS OF BOTH EYES, UNSPECIFIED EYELID, UNSPECIFIED LAGOPHTHALMOS TYPE: Primary | ICD-10-CM

## 2023-10-11 PROCEDURE — 3074F SYST BP LT 130 MM HG: CPT | Performed by: FAMILY MEDICINE

## 2023-10-11 PROCEDURE — 3079F DIAST BP 80-89 MM HG: CPT | Performed by: FAMILY MEDICINE

## 2023-10-11 PROCEDURE — 99213 OFFICE O/P EST LOW 20 MIN: CPT | Performed by: FAMILY MEDICINE

## 2023-10-11 RX ORDER — ALBUTEROL SULFATE 90 UG/1
2 AEROSOL, METERED RESPIRATORY (INHALATION) EVERY 4 HOURS PRN
Qty: 18 G | Refills: 5 | Status: SHIPPED | OUTPATIENT
Start: 2023-10-11

## 2023-10-11 RX ORDER — INSULIN ASPART 100 [IU]/ML
INJECTION, SOLUTION INTRAVENOUS; SUBCUTANEOUS
Qty: 10 ML | Refills: 5 | Status: SHIPPED | OUTPATIENT
Start: 2023-10-11

## 2023-10-11 RX ORDER — METHYLPREDNISOLONE 4 MG/1
TABLET ORAL
Qty: 1 KIT | Refills: 0 | Status: SHIPPED | OUTPATIENT
Start: 2023-10-11

## 2023-10-11 RX ORDER — LISDEXAMFETAMINE DIMESYLATE 40 MG/1
CAPSULE ORAL
COMMUNITY
Start: 2023-10-06

## 2023-10-11 RX ORDER — VALACYCLOVIR HYDROCHLORIDE 1 G/1
1000 TABLET, FILM COATED ORAL 3 TIMES DAILY
Qty: 21 TABLET | Refills: 5 | Status: SHIPPED | OUTPATIENT
Start: 2023-10-11 | End: 2023-11-22

## 2023-10-11 RX ORDER — AZITHROMYCIN 250 MG/1
250 TABLET, FILM COATED ORAL SEE ADMIN INSTRUCTIONS
Qty: 6 TABLET | Refills: 0 | Status: SHIPPED | OUTPATIENT
Start: 2023-10-11 | End: 2023-10-16

## 2023-10-11 SDOH — ECONOMIC STABILITY: FOOD INSECURITY: WITHIN THE PAST 12 MONTHS, YOU WORRIED THAT YOUR FOOD WOULD RUN OUT BEFORE YOU GOT MONEY TO BUY MORE.: NEVER TRUE

## 2023-10-11 SDOH — ECONOMIC STABILITY: FOOD INSECURITY: WITHIN THE PAST 12 MONTHS, THE FOOD YOU BOUGHT JUST DIDN'T LAST AND YOU DIDN'T HAVE MONEY TO GET MORE.: NEVER TRUE

## 2023-10-11 SDOH — ECONOMIC STABILITY: HOUSING INSECURITY
IN THE LAST 12 MONTHS, WAS THERE A TIME WHEN YOU DID NOT HAVE A STEADY PLACE TO SLEEP OR SLEPT IN A SHELTER (INCLUDING NOW)?: NO

## 2023-10-11 SDOH — ECONOMIC STABILITY: INCOME INSECURITY: HOW HARD IS IT FOR YOU TO PAY FOR THE VERY BASICS LIKE FOOD, HOUSING, MEDICAL CARE, AND HEATING?: NOT HARD AT ALL

## 2023-10-11 ASSESSMENT — ENCOUNTER SYMPTOMS: COUGH: 1

## 2023-10-11 NOTE — PROGRESS NOTES
Subjective  Dusty Ryan 1965 is a 66 y.o. female who presents today with:  Chief Complaint   Patient presents with    Surgical Clearance     States she is scheduled for an upcoming eye surgery on 10/31 with Dr. Aviva Doherty and is requesting surgical clearance. Cough     C/O fever, chills, night sweats, dry cough, and SOB. Denies wheezing, ear pain, sore throat, nausea, vomiting, or diarrhea. Symptoms have been present for about 3 weeks. Reports her daughter and grandchildren have been sick with similar symptoms. She has been using cough drops and Tylenol Cold and Flu with minimal effectiveness. Cough      I have reviewed HPI and agree with above. Review of Systems   Respiratory:  Positive for cough. All other systems reviewed and are negative. Past Medical History:   Diagnosis Date    Former smoker     Type 1 diabetes mellitus ([address])      Past Surgical History:   Procedure Laterality Date    APPENDECTOMY      CARDIAC CATHETERIZATION      X2    AND ?   AT [address]      COCCYX REMOVAL      HYSTERECTOMY (CERVIX STATUS UNKNOWN)      VASCULAR SURGERY Left 2022    LEFT FEMORAL STENT  /  [facility]    VASCULAR SURGERY  2022    LLE ANGIOGRAM  /  DR Jodi Bland    VASCULAR SURGERY  2022    [facility]  /  Reena Austin  /  [facility]     Social History     Socioeconomic History    Marital status:      Spouse name: Not on file    Number of children: Not on file    Years of education: Not on file    Highest education level: Not on file   Occupational History    Not on file   Tobacco Use    Smoking status: Former     Packs/day: 1.00     Years: 25.00     Additional pack years: 0.00     Total pack years: 25.00     Types: Cigarettes     Start date: 0     Quit date: 2021     Years since quittin.7    Smokeless tobacco: Never   Vaping Use    Vaping Use: Never used   Substance and Sexual Activity    Alcohol use: Yes     Comment: \"Couple times a week\"

## 2023-10-31 RX ORDER — KETOCONAZOLE 20 MG/ML
SHAMPOO TOPICAL
Qty: 120 ML | Refills: 1 | Status: SHIPPED | OUTPATIENT
Start: 2023-10-31

## 2023-11-30 DIAGNOSIS — R21 RASH AND OTHER NONSPECIFIC SKIN ERUPTION: Primary | ICD-10-CM

## 2023-12-02 DIAGNOSIS — E34.9 HORMONE IMBALANCE: ICD-10-CM

## 2023-12-03 NOTE — TELEPHONE ENCOUNTER
Pharmacy requesting medication refill. Please approve or deny this request.    Rx requested:  Requested Prescriptions     Pending Prescriptions Disp Refills    progesterone (PROMETRIUM) 200 MG CAPS capsule [Pharmacy Med Name: PROGESTERONE 200 MG CAPSULE] 90 capsule 0     Sig: TAKE 1 CAPSULE BY MOUTH EVERY DAY AT NIGHT         Last Office Visit:   10/11/2023      Next Visit Date:  Future Appointments   Date Time Provider Department Center   2/29/2024  2:00 PM Saad Marques, DO [facility]

## 2023-12-04 RX ORDER — PROGESTERONE 200 MG/1
CAPSULE ORAL
Qty: 90 CAPSULE | Refills: 0 | Status: SHIPPED | OUTPATIENT
Start: 2023-12-04

## 2023-12-26 ENCOUNTER — HOSPITAL ENCOUNTER (EMERGENCY)
Dept: HOSPITAL 101 - ER | Age: 58
Discharge: HOME | End: 2023-12-26
Payer: COMMERCIAL

## 2023-12-26 VITALS
HEART RATE: 79 BPM | RESPIRATION RATE: 20 BRPM | OXYGEN SATURATION: 99 % | TEMPERATURE: 97.88 F | DIASTOLIC BLOOD PRESSURE: 83 MMHG | SYSTOLIC BLOOD PRESSURE: 131 MMHG

## 2023-12-26 VITALS — BODY MASS INDEX: 21.1 KG/M2

## 2023-12-26 DIAGNOSIS — J06.9: Primary | ICD-10-CM

## 2023-12-26 DIAGNOSIS — Z87.891: ICD-10-CM

## 2023-12-26 DIAGNOSIS — Z20.822: ICD-10-CM

## 2023-12-26 DIAGNOSIS — J44.1: ICD-10-CM

## 2023-12-26 LAB — SARS-COV-2 AG: NEGATIVE

## 2023-12-26 PROCEDURE — 87635 SARS-COV-2 COVID-19 AMP PRB: CPT

## 2023-12-26 PROCEDURE — 99284 EMERGENCY DEPT VISIT MOD MDM: CPT

## 2023-12-26 PROCEDURE — 87811 SARS-COV-2 COVID19 W/OPTIC: CPT

## 2023-12-26 PROCEDURE — 87804 INFLUENZA ASSAY W/OPTIC: CPT

## 2023-12-26 NOTE — ED.GENADUL1
HPI - General Adult
General
Chief complaint: Upper Respiratory Infection
Stated complaint: URTI
Time Seen by Provider: 12/26/23 16:34
Source: patient
Mode of arrival: walk-in
Limitations: no limitations
History of Present Illness
HPI narrative: 
Patient with several days of chest  congestion following nasal congestion, sore throat, ear fullness and sinus pressure.  The patient is a former smoker who has been told that she has  early COPD . She is concerned that she has pneumonia -  it 
happens when the infection moves down into my chest. 
No vomiting or diarrhea. No fever or chills at home.
Related Data
Previous Rx's

 Medication  Instructions  Recorded
brompheniramine-pseudoephedrine-DM 5 ml PO Q6H PRN sinus symptoms 12/26/23
2 mg-30 mg-10 mg/5 mL oral syrup #118 mL 
(Bromfed DM)  
doxycycline hyclate 100 mg capsule 100 mg PO BID 7 days #14 caps 12/26/23
methylprednisolone 4 mg tablets in 4 mg PO DAILY #21 ea 12/26/23
a dose pack (Medrol (Blane))  


Allergies

Allergy/AdvReac Type Severity Reaction Status Date / Time
meperidine [From Demerol] AdvReac Severe Vomiting Verified 12/26/23 16:23



PFSH
PFSH
Social History
Smoking status:  Former smoker 



Exam
Narrative
Exam Narrative: 
Nurses notes and vital signs reviewed and patient is not hypoxic.
afebrile
General:  Well-appearing and in no apparent distress.  
Skin:  Warm, dry, no pallor noted.  
No rash.
Head:  Normocephalic, atraumatic.
Neck:  Supple, non-tender.  No cervical lymphadenopathy
Eye:  Pupils are equal, round and EOMI. No scleral icterus.
Ears, Nose, Mouth, and Throat:  TM are dull bilaterally with retro tympanic fluid, mild posterior oropharynx erythema and nasal mucosal hypertrophy, uvula is mid-line
Oral mucosa is moist
Cardiovascular:  Regular Rate and Rhythm without murmur, gallop or rub.
Respiratory:  No accessory muscle use or respiratory distress.
Lungs with scattered rhonchi but no expiratory wheezes or rails.
Neurological:  A&O x4.  No cranial nerve dysfunction observed.  No truncal ataxia. Moves all extremities. Sensation intact.
Psychiatric:  Cooperative and interactive. Normal mood and affect.
Constitutional
Vital Signs, click to edit/add: 

Last Vital Signs

Temp  98 F   12/26/23 16:20
Pulse  79   12/26/23 16:20
Resp  20   12/26/23 16:20
BP  131/83   12/26/23 16:20
Pulse Ox  99   12/26/23 16:20




Course
Vital Signs
Vital signs: 

Vital Signs

Temperature  98 F   12/26/23 16:20
Pulse Rate  79   12/26/23 16:20
Respiratory Rate  20   12/26/23 16:20
Blood Pressure  131/83   12/26/23 16:20
Pulse Oximetry  99   12/26/23 16:20



Temperature  98 F   12/26/23 16:20
Pulse Rate  79   12/26/23 16:20
Respiratory Rate  20   12/26/23 16:20
Blood Pressure  131/83   12/26/23 16:20
Pulse Oximetry  99   12/26/23 16:20




Medical Decision Making
MDM Narrative
Medical decision making narrative: 
Swabs were negative for influenza and COVID.  She was discharged home with prescriptions for Bromfed-DM, steroid taper and doxycycline, to cover her upper respiratory symptoms and exacerbation of her early COPD/underlying lung disease.
Lab Data
Lab results reviewed: Yes I reviewed the patient's lab results
Labs: 

Lab Results

  12/26/23 Range/Units
  16:25 
SARS-CoV-2 (PCR)  Negative  (NEGATIVE)  
Influenza Type A Ag  Negative  
Influenza Type B Ag  Negative  




Discharge Plan
Discharge
Chief Complaint: Upper Respiratory Infection

Clinical Impression:
 Upper respiratory infection, Asthma exacerbation in COPD


Patient Disposition: Home, Self-Care

Time of Disposition Decision: 17:28

Prescriptions / Home Meds:
New
  doxycycline hyclate 100 mg capsule 
   100 mg PO BID 7 Days Qty: 14 0RF
  methylprednisolone [Medrol (Blane)] 4 mg tablets,dose pack 
   4 mg PO DAILY Qty: 21 0RF
   Rx Instructions:
   follow instructions on blister pack
  brompheniramine-pseudoeph-DM [Bromfed DM] 2-30-10 mg/5 mL syrup 
   5 ml PO Q6H PRN (Reason: sinus symptoms) Qty: 118 0RF

Instructions:  Upper Respiratory Infection (ED), COPD (Chronic Obstructive Pulmonary Disease) (ED)

Stand Alone Forms:  Portal Instructions

Referrals:
YOVANNY ERAZO [Primary Care Provider] - 1 week

## 2023-12-27 LAB — SARS-COV-2 NAA: NOT DETECTED

## 2024-01-01 DIAGNOSIS — E78.5 HYPERLIPIDEMIA, UNSPECIFIED: ICD-10-CM

## 2024-01-04 PROBLEM — I73.9 INTERMITTENT CLAUDICATION (CMS-HCC): Status: ACTIVE | Noted: 2024-01-04

## 2024-01-04 PROBLEM — M79.7 FIBROMYALGIA: Status: ACTIVE | Noted: 2024-01-04

## 2024-01-04 PROBLEM — E11.9 DIABETES MELLITUS (MULTI): Status: ACTIVE | Noted: 2024-01-04

## 2024-01-04 PROBLEM — Z78.9 STATIN INTOLERANCE: Status: ACTIVE | Noted: 2024-01-04

## 2024-01-04 PROBLEM — I73.9 PVD (PERIPHERAL VASCULAR DISEASE) (CMS-HCC): Status: ACTIVE | Noted: 2024-01-04

## 2024-01-04 PROBLEM — I10 BENIGN ESSENTIAL HYPERTENSION: Status: ACTIVE | Noted: 2024-01-04

## 2024-01-04 PROBLEM — E78.5 HYPERLIPIDEMIA: Status: ACTIVE | Noted: 2024-01-04

## 2024-01-04 RX ORDER — ALBUTEROL SULFATE 90 UG/1
2 AEROSOL, METERED RESPIRATORY (INHALATION) EVERY 4 HOURS PRN
COMMUNITY

## 2024-01-04 RX ORDER — RIVAROXABAN 2.5 MG/1
2.5 TABLET, FILM COATED ORAL 2 TIMES DAILY
COMMUNITY
End: 2024-04-11 | Stop reason: ALTCHOICE

## 2024-01-04 RX ORDER — ALIROCUMAB 75 MG/ML
75 INJECTION, SOLUTION SUBCUTANEOUS
COMMUNITY
End: 2024-04-11 | Stop reason: SDUPTHER

## 2024-01-04 RX ORDER — ONDANSETRON 4 MG/1
4 TABLET, ORALLY DISINTEGRATING ORAL SEE ADMIN INSTRUCTIONS
COMMUNITY
Start: 2021-03-11

## 2024-01-04 RX ORDER — AMLODIPINE BESYLATE 10 MG/1
10 TABLET ORAL DAILY
COMMUNITY
End: 2024-02-02

## 2024-01-04 RX ORDER — CYCLOBENZAPRINE HCL 10 MG
1 TABLET ORAL 3 TIMES DAILY
COMMUNITY
End: 2024-04-11 | Stop reason: ALTCHOICE

## 2024-01-04 RX ORDER — IBUPROFEN 800 MG/1
800 TABLET ORAL SEE ADMIN INSTRUCTIONS
COMMUNITY
Start: 2021-03-04

## 2024-01-04 RX ORDER — GABAPENTIN 100 MG/1
200 CAPSULE ORAL 3 TIMES DAILY
COMMUNITY
End: 2024-04-11 | Stop reason: ALTCHOICE

## 2024-01-04 RX ORDER — ASPIRIN 81 MG/1
81 TABLET ORAL DAILY
COMMUNITY

## 2024-01-04 RX ORDER — METOPROLOL TARTRATE 25 MG/1
25 TABLET, FILM COATED ORAL DAILY
COMMUNITY

## 2024-01-04 RX ORDER — INSULIN ASPART 100 [IU]/ML
INJECTION, SOLUTION INTRAVENOUS; SUBCUTANEOUS
COMMUNITY
Start: 2023-12-31

## 2024-01-05 RX ORDER — ALIROCUMAB 75 MG/ML
INJECTION, SOLUTION SUBCUTANEOUS
Qty: 6 PEN | Refills: 3 | Status: SHIPPED | OUTPATIENT
Start: 2024-01-05 | End: 2025-01-04

## 2024-01-11 ENCOUNTER — TELEPHONE (OUTPATIENT)
Dept: CARDIOLOGY | Facility: CLINIC | Age: 59
End: 2024-01-11
Payer: COMMERCIAL

## 2024-01-11 NOTE — TELEPHONE ENCOUNTER
Left voicemail advising OV with HMI on 1/23 has been cancelled due to HMI being out of office. Advised we will call back to reschedule at later date.

## 2024-01-23 ENCOUNTER — APPOINTMENT (OUTPATIENT)
Dept: CARDIOLOGY | Facility: CLINIC | Age: 59
End: 2024-01-23
Payer: COMMERCIAL

## 2024-02-01 DIAGNOSIS — I10 BENIGN ESSENTIAL HYPERTENSION: ICD-10-CM

## 2024-02-02 RX ORDER — AMLODIPINE BESYLATE 10 MG/1
10 TABLET ORAL DAILY
Qty: 90 TABLET | Refills: 3 | Status: SHIPPED | OUTPATIENT
Start: 2024-02-02

## 2024-02-05 ENCOUNTER — APPOINTMENT (OUTPATIENT)
Dept: CARDIOLOGY | Facility: CLINIC | Age: 59
End: 2024-02-05
Payer: COMMERCIAL

## 2024-02-20 ENCOUNTER — OFFICE VISIT (OUTPATIENT)
Dept: FAMILY MEDICINE CLINIC | Age: 59
End: 2024-02-20
Payer: COMMERCIAL

## 2024-02-20 DIAGNOSIS — E10.22 TYPE 1 DIABETES MELLITUS WITH DIABETIC CHRONIC KIDNEY DISEASE, UNSPECIFIED CKD STAGE (HCC): ICD-10-CM

## 2024-02-20 DIAGNOSIS — F32.1 MODERATE MAJOR DEPRESSION (HCC): Primary | ICD-10-CM

## 2024-02-20 DIAGNOSIS — Z87.891 PERSONAL HISTORY OF TOBACCO USE: ICD-10-CM

## 2024-02-20 DIAGNOSIS — I10 PRIMARY HYPERTENSION: ICD-10-CM

## 2024-02-20 DIAGNOSIS — F17.210 CIGARETTE NICOTINE DEPENDENCE WITHOUT COMPLICATION: ICD-10-CM

## 2024-02-20 DIAGNOSIS — D35.2 PITUITARY ADENOMA (HCC): ICD-10-CM

## 2024-02-20 DIAGNOSIS — J44.9 CHRONIC OBSTRUCTIVE PULMONARY DISEASE, UNSPECIFIED COPD TYPE (HCC): ICD-10-CM

## 2024-02-20 LAB
HBA1C MFR BLD: 7.3 % (ref 4.8–5.9)
HBA1C MFR BLD: 7.6 %
PROLACTIN SERPL-MCNC: 9.5 NG/ML
TSH REFLEX: 1.78 UIU/ML (ref 0.44–3.86)

## 2024-02-20 PROCEDURE — 2022F DILAT RTA XM EVC RTNOPTHY: CPT | Performed by: FAMILY MEDICINE

## 2024-02-20 PROCEDURE — 3023F SPIROM DOC REV: CPT | Performed by: FAMILY MEDICINE

## 2024-02-20 PROCEDURE — 1036F TOBACCO NON-USER: CPT | Performed by: FAMILY MEDICINE

## 2024-02-20 PROCEDURE — G8420 CALC BMI NORM PARAMETERS: HCPCS | Performed by: FAMILY MEDICINE

## 2024-02-20 PROCEDURE — 83036 HEMOGLOBIN GLYCOSYLATED A1C: CPT | Performed by: FAMILY MEDICINE

## 2024-02-20 PROCEDURE — G8427 DOCREV CUR MEDS BY ELIG CLIN: HCPCS | Performed by: FAMILY MEDICINE

## 2024-02-20 PROCEDURE — 3017F COLORECTAL CA SCREEN DOC REV: CPT | Performed by: FAMILY MEDICINE

## 2024-02-20 PROCEDURE — 99214 OFFICE O/P EST MOD 30 MIN: CPT | Performed by: FAMILY MEDICINE

## 2024-02-20 PROCEDURE — G0296 VISIT TO DETERM LDCT ELIG: HCPCS | Performed by: FAMILY MEDICINE

## 2024-02-20 PROCEDURE — G8484 FLU IMMUNIZE NO ADMIN: HCPCS | Performed by: FAMILY MEDICINE

## 2024-02-20 PROCEDURE — 3051F HG A1C>EQUAL 7.0%<8.0%: CPT | Performed by: FAMILY MEDICINE

## 2024-02-20 RX ORDER — DUPILUMAB 300 MG/2ML
INJECTION, SOLUTION SUBCUTANEOUS
COMMUNITY
Start: 2024-02-08

## 2024-02-20 RX ORDER — DOXEPIN HYDROCHLORIDE 10 MG/1
CAPSULE ORAL
COMMUNITY
Start: 2024-01-17

## 2024-02-20 RX ORDER — IBUPROFEN 800 MG/1
TABLET ORAL
COMMUNITY
Start: 2024-02-14

## 2024-02-20 ASSESSMENT — PATIENT HEALTH QUESTIONNAIRE - PHQ9
10. IF YOU CHECKED OFF ANY PROBLEMS, HOW DIFFICULT HAVE THESE PROBLEMS MADE IT FOR YOU TO DO YOUR WORK, TAKE CARE OF THINGS AT HOME, OR GET ALONG WITH OTHER PEOPLE: 0
5. POOR APPETITE OR OVEREATING: 0
SUM OF ALL RESPONSES TO PHQ QUESTIONS 1-9: 0
7. TROUBLE CONCENTRATING ON THINGS, SUCH AS READING THE NEWSPAPER OR WATCHING TELEVISION: 0
SUM OF ALL RESPONSES TO PHQ QUESTIONS 1-9: 0
2. FEELING DOWN, DEPRESSED OR HOPELESS: 0
4. FEELING TIRED OR HAVING LITTLE ENERGY: 0
6. FEELING BAD ABOUT YOURSELF - OR THAT YOU ARE A FAILURE OR HAVE LET YOURSELF OR YOUR FAMILY DOWN: 0
8. MOVING OR SPEAKING SO SLOWLY THAT OTHER PEOPLE COULD HAVE NOTICED. OR THE OPPOSITE, BEING SO FIGETY OR RESTLESS THAT YOU HAVE BEEN MOVING AROUND A LOT MORE THAN USUAL: 0
1. LITTLE INTEREST OR PLEASURE IN DOING THINGS: 0
3. TROUBLE FALLING OR STAYING ASLEEP: 0
9. THOUGHTS THAT YOU WOULD BE BETTER OFF DEAD, OR OF HURTING YOURSELF: 0
SUM OF ALL RESPONSES TO PHQ9 QUESTIONS 1 & 2: 0

## 2024-02-20 ASSESSMENT — ENCOUNTER SYMPTOMS: SHORTNESS OF BREATH: 1

## 2024-02-20 NOTE — PATIENT INSTRUCTIONS
follow-up, he or she will help you understand what to do next.  After a lung cancer screening, you can go back to your usual activities right away.  A lung cancer screening test can't tell if you have lung cancer. If your results are positive, your doctor can't tell whether an abnormal finding is a harmless nodule, cancer, or something else without doing more tests.  What can you do to help prevent lung cancer?  Some lung cancers can't be prevented. But if you smoke, quitting smoking is the best step you can take to prevent lung cancer. If you want to quit, your doctor can recommend medicines or other ways to help.  Follow-up care is a key part of your treatment and safety. Be sure to make and go to all appointments, and call your doctor if you are having problems. It's also a good idea to know your test results and keep a list of the medicines you take.  Where can you learn more?  Go to https://www.[employer].net/patientEd and enter Q940 to learn more about \"Learning About Lung Cancer Screening.\"  Current as of: February 28, 2023               Content Version: 13.9  © 6032-6552 [employer].   Care instructions adapted under license by [employer]. If you have questions about a medical condition or this instruction, always ask your healthcare professional. [employer] disclaims any warranty or liability for your use of this information.

## 2024-02-20 NOTE — PROGRESS NOTES
-- DO NOT REPLY / DO NOT REPLY ALL --  -- Message is from the [facility]--    COVID-19 [place] Screening:  Neg      Patient has an appointment Monday 1/4/2020 with Dr Wolfe for back pain. Patient will need a , he speaks only Cameroonian.        SM    Future Appointments   Date Time Provider Department Center   1/4/2021  4:15 PM Heladio Wolfe MD [id number] [facility]   1/11/2021  5:30 PM Burt Smith MD [id number] [facility]               Concha Portillo 1965 is a 58 y.o. female who presents today with:  Chief Complaint   Patient presents with    Depression     Moods stable with current medications. Follows with Cristel Sweet CNP.     Hypertension     She does not monitor her BP at home. Denies chest pains, palpitations, SOB, dizziness, lightheadedness, headaches or edema.     Diabetes     She monitors her blood sugars multiple times daily with a CGM. Average blood sugar is 169. A1C was 7.0 in 2023. DM eye exam completed within the last year with Dr. Coley.    Shortness of Breath     C/O recurrent bronchitis/pneumonia, chronic fatigue, cough, SOB & occasional wheezing. States she has been told previously that she has the beginning stages of COPD. No recent PFT. She does not have a current pulmonologist. She is a former smoker. Would like to discuss orders for a low dose CT scan. She has previously used an Advair inhaler and would like to try this again. States she is usually prescribed prednisone to help clear up the bronchitis or pneumonia, but then her blood sugars are 400+    Other     States she has a tumor on her pituitary gland. She has not had any recent imaging. She believes she had previous imaging done at [facility].     Thyroid Problem     Requesting orders for thyroid testing as she has not had any recent labs.      I have reviewed HPI and agree with above.     Review of Systems   Respiratory:  Positive for shortness of breath.    All other systems reviewed and are negative.      Past Medical History:   Diagnosis Date    Former smoker     Type 1 diabetes mellitus (HCC)     Type 1 diabetes mellitus with diabetic chronic kidney disease (HCC)      Past Surgical History:   Procedure Laterality Date    APPENDECTOMY      CARDIAC CATHETERIZATION      X2    AND 2018?  AT [facility]     SECTION      COCCYX REMOVAL      HYSTERECTOMY (CERVIX STATUS UNKNOWN)  2002    VASCULAR SURGERY Left 2022    LEFT

## 2024-02-22 LAB — ACTH PLAS-MCNC: 7.3 PG/ML (ref 7.2–63.3)

## 2024-02-29 ENCOUNTER — TELEPHONE (OUTPATIENT)
Dept: FAMILY MEDICINE CLINIC | Age: 59
End: 2024-02-29

## 2024-02-29 DIAGNOSIS — E34.9 HORMONE IMBALANCE: ICD-10-CM

## 2024-02-29 RX ORDER — PROGESTERONE 200 MG/1
200 CAPSULE ORAL NIGHTLY
Qty: 90 CAPSULE | Refills: 0 | Status: SHIPPED | OUTPATIENT
Start: 2024-02-29

## 2024-02-29 NOTE — TELEPHONE ENCOUNTER
Angela from [facility] calling says Please Send No More Duplicate Faxes HAS 19 copies now of office notes.    Angela says that the CMM was faxed to us but is still missing the date and initials. Insurance will not accept these without this done.    Angela faxed back form yesterday 02/28/24

## 2024-02-29 NOTE — TELEPHONE ENCOUNTER
Future Appointments    Encounter Information   Provider Department Appt Notes   3/4/2024 [place] CT ROOM 1 [facility] CT Scan epic // pt   3/6/2024 [place] PFT ROOM 1 [facility] Pulmonary Function epic // pt   8/20/2024 Saad Marques, DO [facility] 6 mo f/u     Past Visits    Date Provider Specialty Visit Type Primary Dx   02/20/2024 Saad Marques,  Family Medicine Office Visit Moderate major depression (HCC)   10/11/2023 Saad Marques,  Family Medicine Office Visit Lagophthalmos of eyelids of both eyes, unspecified eyelid, unspecified lagophthalmos type   08/30/2023 Saad Marques,  Family Medicine Office Visit Lagophthalmos of eyelids of both eyes, unspecified eyelid, unspecified lagophthalmos type   04/12/2022 Delos Reyes, Arthur, MD IP Unit Surgery

## 2024-03-04 ENCOUNTER — HOSPITAL ENCOUNTER (OUTPATIENT)
Dept: CT IMAGING | Age: 59
Discharge: HOME | End: 2024-03-06
Attending: FAMILY MEDICINE
Payer: COMMERCIAL

## 2024-03-04 DIAGNOSIS — Z87.891 PERSONAL HISTORY OF TOBACCO USE: ICD-10-CM

## 2024-03-04 PROCEDURE — 71271 CT THORAX LUNG CANCER SCR C-: CPT

## 2024-03-06 ENCOUNTER — TELEPHONE (OUTPATIENT)
Dept: FAMILY MEDICINE CLINIC | Age: 59
End: 2024-03-06

## 2024-03-08 NOTE — TELEPHONE ENCOUNTER
She said yes you can call her next week. She is asking if a steroid pack and an antibiotic can be called in for her. She is all congested and states she is having a hard time breathing. She also stated the inhaler you recently prescribed isn't working. She [facility] in [place].

## 2024-03-26 RX ORDER — INSULIN ASPART 100 [IU]/ML
INJECTION, SOLUTION INTRAVENOUS; SUBCUTANEOUS
Qty: 10 ML | Refills: 5 | Status: SHIPPED | OUTPATIENT
Start: 2024-03-26

## 2024-04-02 RX ORDER — TERBINAFINE HYDROCHLORIDE 250 MG/1
250 TABLET ORAL DAILY
Qty: 90 TABLET | Refills: 1 | Status: SHIPPED | OUTPATIENT
Start: 2024-04-02

## 2024-04-02 NOTE — TELEPHONE ENCOUNTER
Future Appointments    Encounter Information   Provider Department Appt Notes   4/23/2024 [place] PFT ROOM 1 [facility] Pulmonary Function epic // pt// resecheduled  03/05 PER AZRA PT NEEDS TO BE MOVED TO NEXT DAY AVAILABLE. PFT TECH WILL BE OUT ON 3/6/24   8/20/2024 Saad Marques DO [facility] 6 mo f/u     Past Visits    Date Provider Specialty Visit Type Primary Dx   02/20/2024 Saad Marques DO Family Medicine Office Visit Moderate major depression (HCC)

## 2024-04-10 ENCOUNTER — APPOINTMENT (OUTPATIENT)
Dept: CARDIOLOGY | Facility: CLINIC | Age: 59
End: 2024-04-10
Payer: COMMERCIAL

## 2024-04-11 ENCOUNTER — OFFICE VISIT (OUTPATIENT)
Dept: CARDIOLOGY | Facility: CLINIC | Age: 59
End: 2024-04-11
Payer: COMMERCIAL

## 2024-04-11 ENCOUNTER — TELEPHONE (OUTPATIENT)
Dept: CARDIOLOGY | Facility: CLINIC | Age: 59
End: 2024-04-11

## 2024-04-11 VITALS
HEART RATE: 82 BPM | WEIGHT: 130 LBS | BODY MASS INDEX: 22.2 KG/M2 | HEIGHT: 64 IN | DIASTOLIC BLOOD PRESSURE: 84 MMHG | SYSTOLIC BLOOD PRESSURE: 136 MMHG

## 2024-04-11 DIAGNOSIS — E78.5 HYPERLIPIDEMIA, UNSPECIFIED HYPERLIPIDEMIA TYPE: ICD-10-CM

## 2024-04-11 DIAGNOSIS — I10 BENIGN ESSENTIAL HYPERTENSION: ICD-10-CM

## 2024-04-11 DIAGNOSIS — E13.9 DIABETES MELLITUS OF OTHER TYPE WITHOUT COMPLICATION, UNSPECIFIED WHETHER LONG TERM INSULIN USE (MULTI): ICD-10-CM

## 2024-04-11 DIAGNOSIS — I73.9 PVD (PERIPHERAL VASCULAR DISEASE) (CMS-HCC): ICD-10-CM

## 2024-04-11 DIAGNOSIS — Z87.891 FORMER SMOKER: ICD-10-CM

## 2024-04-11 DIAGNOSIS — Z78.9 STATIN INTOLERANCE: ICD-10-CM

## 2024-04-11 DIAGNOSIS — I73.9 INTERMITTENT CLAUDICATION (CMS-HCC): ICD-10-CM

## 2024-04-11 PROCEDURE — 99214 OFFICE O/P EST MOD 30 MIN: CPT | Performed by: INTERNAL MEDICINE

## 2024-04-11 PROCEDURE — 1036F TOBACCO NON-USER: CPT | Performed by: INTERNAL MEDICINE

## 2024-04-11 PROCEDURE — 3075F SYST BP GE 130 - 139MM HG: CPT | Performed by: INTERNAL MEDICINE

## 2024-04-11 PROCEDURE — 3079F DIAST BP 80-89 MM HG: CPT | Performed by: INTERNAL MEDICINE

## 2024-04-11 RX ORDER — CLOPIDOGREL BISULFATE 75 MG/1
75 TABLET ORAL DAILY
COMMUNITY
Start: 2023-05-19

## 2024-04-11 RX ORDER — CETIRIZINE HYDROCHLORIDE 10 MG/1
10 TABLET ORAL DAILY
COMMUNITY
Start: 2023-05-20

## 2024-04-11 NOTE — PROGRESS NOTES
"Subjective   Venecia Campos is a 58 y.o. female       Chief Complaint    Follow-up          HPI   Patient is in the office for follow-up for the problems noted below.  She currently has no complaints from a cardiac standpoint.  Her vascular disease seems to be stable and she follows with vascular surgery in [place].  She could not afford vascular dose  Xarelto therefore she is currently on aspirin and Plavix.  She has no dyspnea palpitations orthopnea PND or lower extremity edema.  She will be signing up for Medicare this coming summer which will make it very difficult for her to obtain Praluent and unfortunately she is intolerant to statin.    ASSESSMENT AND PLAN:     1. Hypertension, currently under control medical therapy which will be left unchanged, basic metabolic profile is ordered  2. Hyperlipidemia. Intolerant to statin.  Under excellent control on Praluent, she is dropping her insurance December to sign up for Medicare which would make it very difficult for her to obtain this medicine and she is frustrated with that prospect  3. Diabetes, managed by , seems to be getting under control.  4. Severe PAD mostly involving the left lower extremity status post surgical revascularization 2022 followed by vascular surgery in [facility] in [place]. Aggressive risk factor modification was emphasized.  Patient dropped taken Xarelto due to cost and she is taking aspirin Plavix only.    Annette Zabala MD, FACC         Review of Systems   All other systems reviewed and are negative.           Vitals:    04/11/24 1032   BP: 136/84   BP Location: Right arm   Patient Position: Sitting   Pulse: 82   Weight: 59 kg (130 lb)   Height: 1.626 m (5' 4\")        Objective   Physical Exam  Constitutional:       Appearance: Normal appearance.   HENT:      Nose: Nose normal.   Neck:      Vascular: No carotid bruit.   Cardiovascular:      Rate and Rhythm: Normal rate.      Pulses: Normal pulses.      Heart sounds: Normal heart " sounds.   Pulmonary:      Effort: Pulmonary effort is normal.   Abdominal:      General: Bowel sounds are normal.      Palpations: Abdomen is soft.   Musculoskeletal:         General: Normal range of motion.      Cervical back: Normal range of motion.      Right lower leg: No edema.      Left lower leg: No edema.   Skin:     General: Skin is warm and dry.   Neurological:      General: No focal deficit present.      Mental Status: She is alert.   Psychiatric:         Mood and Affect: Mood normal.         Behavior: Behavior normal.         Thought Content: Thought content normal.         Judgment: Judgment normal.         Allergies  Latex and Statins-hmg-coa reductase inhibitors     Current Medications    Current Outpatient Medications:     albuterol 90 mcg/actuation inhaler, Inhale 2 puffs every 4 hours if needed for wheezing., Disp: , Rfl:     amLODIPine (Norvasc) 10 mg tablet, TAKE 1 TABLET DAILY, Disp: 90 tablet, Rfl: 3    aspirin 81 mg EC tablet, Take 1 tablet (81 mg) by mouth once daily., Disp: , Rfl:     cetirizine (ZyrTEC) 10 mg tablet, Take 1 tablet (10 mg) by mouth once daily., Disp: , Rfl:     clopidogrel (Plavix) 75 mg tablet, Take 1 tablet (75 mg) by mouth once daily., Disp: , Rfl:     dupilumab (DUPIXENT PEN SUBQ), Inject under the skin every 14 (fourteen) days., Disp: , Rfl:     ibuprofen 800 mg tablet, Take 1 tablet (800 mg) by mouth see administration instructions., Disp: , Rfl:     metoprolol tartrate (Lopressor) 25 mg tablet, Take 1 tablet (25 mg) by mouth once daily., Disp: , Rfl:     NovoLOG U-100 Insulin aspart 100 unit/mL injection, , Disp: , Rfl:     ondansetron ODT (Zofran-ODT) 4 mg disintegrating tablet, Take 1 tablet (4 mg) by mouth see administration instructions., Disp: , Rfl:     Praluent Pen 75 mg/mL pen injector, INJECT SUBCUTANEOUSLY ONCE EVERY 2 WEEKS, Disp: 6 Pen, Rfl: 3                     Assessment/Plan   1. Benign essential hypertension  Follow Up In Cardiology    [facility]    Basic  Metabolic Panel    CBC    Basic Metabolic Panel      2. Hyperlipidemia, unspecified hyperlipidemia type  Lipid Panel    Lipid Panel      3. Statin intolerance  Lipid Panel    Lipid Panel      4. PVD (peripheral vascular disease) (CMS/[facility])  Lipid Panel    CBC    Lipid Panel    CBC      5. Intermittent claudication (CMS/[facility])        6. Diabetes mellitus of other type without complication, unspecified whether long term insulin use (CMS/[facility])        7. Former smoker                 Scribe Attestation  By signing my name below, IKimberly LPN, Scribe   attest that this documentation has been prepared under the direction and in the presence of Annette Zabala MD.     Provider Attestation - Scribe documentation    All medical record entries made by the Scribe were at my direction and personally dictated by me. I have reviewed the chart and agree that the record accurately reflects my personal performance of the history, physical exam, discussion and plan.

## 2024-04-11 NOTE — TELEPHONE ENCOUNTER
Patient is not taking Xarelto. Med list updated. Left message for patient to update to remove from her printed medication list.

## 2024-04-11 NOTE — LETTER
April 11, 2024     John Henriquez DO  [address]  [facility] [id number]    Patient: Venceia Campos   YOB: 1965   Date of Visit: 4/11/2024       Dear Dr. John Henriquez DO:    Thank you for referring Venecia Campos to me for evaluation. Below are my notes for this consultation.  If you have questions, please do not hesitate to call me. I look forward to following your patient along with you.       Sincerely,     Annette Zabala MD      CC: No Recipients  ______________________________________________________________________________________    Subjective   Venecia Campos is a 58 y.o. female       Chief Complaint    Follow-up          HPI   Patient is in the office for follow-up for the problems noted below.  She currently has no complaints from a cardiac standpoint.  Her vascular disease seems to be stable and she follows with vascular surgery in [place].  She could not afford vascular dose  Xarelto therefore she is currently on aspirin and Plavix.  She has no dyspnea palpitations orthopnea PND or lower extremity edema.  She will be signing up for Medicare this coming summer which will make it very difficult for her to obtain Praluent and unfortunately she is intolerant to statin.    ASSESSMENT AND PLAN:     1. Hypertension, currently under control medical therapy which will be left unchanged, basic metabolic profile is ordered  2. Hyperlipidemia. Intolerant to statin.  Under excellent control on Praluent, she is dropping her insurance December to sign up for Medicare which would make it very difficult for her to obtain this medicine and she is frustrated with that prospect  3. Diabetes, managed by , seems to be getting under control.  4. Severe PAD mostly involving the left lower extremity status post surgical revascularization 2022 followed by vascular surgery in [facility] in [place]. Aggressive risk factor modification was emphasized.   "Patient dropped taken Xarelto due to cost and she is taking aspirin Plavix only.    Annette Zabala MD, [facility]         Review of Systems   All other systems reviewed and are negative.           Vitals:    04/11/24 1032   BP: 136/84   BP Location: Right arm   Patient Position: Sitting   Pulse: 82   Weight: 59 kg (130 lb)   Height: 1.626 m (5' 4\")        Objective   Physical Exam  Constitutional:       Appearance: Normal appearance.   HENT:      Nose: Nose normal.   Neck:      Vascular: No carotid bruit.   Cardiovascular:      Rate and Rhythm: Normal rate.      Pulses: Normal pulses.      Heart sounds: Normal heart sounds.   Pulmonary:      Effort: Pulmonary effort is normal.   Abdominal:      General: Bowel sounds are normal.      Palpations: Abdomen is soft.   Musculoskeletal:         General: Normal range of motion.      Cervical back: Normal range of motion.      Right lower leg: No edema.      Left lower leg: No edema.   Skin:     General: Skin is warm and dry.   Neurological:      General: No focal deficit present.      Mental Status: She is alert.   Psychiatric:         Mood and Affect: Mood normal.         Behavior: Behavior normal.         Thought Content: Thought content normal.         Judgment: Judgment normal.         Allergies  Latex and Statins-hmg-coa reductase inhibitors     Current Medications    Current Outpatient Medications:   •  albuterol 90 mcg/actuation inhaler, Inhale 2 puffs every 4 hours if needed for wheezing., Disp: , Rfl:   •  amLODIPine (Norvasc) 10 mg tablet, TAKE 1 TABLET DAILY, Disp: 90 tablet, Rfl: 3  •  aspirin 81 mg EC tablet, Take 1 tablet (81 mg) by mouth once daily., Disp: , Rfl:   •  cetirizine (ZyrTEC) 10 mg tablet, Take 1 tablet (10 mg) by mouth once daily., Disp: , Rfl:   •  clopidogrel (Plavix) 75 mg tablet, Take 1 tablet (75 mg) by mouth once daily., Disp: , Rfl:   •  dupilumab (DUPIXENT PEN SUBQ), Inject under the skin every 14 (fourteen) days., Disp: , Rfl:   •  " ibuprofen 800 mg tablet, Take 1 tablet (800 mg) by mouth see administration instructions., Disp: , Rfl:   •  metoprolol tartrate (Lopressor) 25 mg tablet, Take 1 tablet (25 mg) by mouth once daily., Disp: , Rfl:   •  NovoLOG U-100 Insulin aspart 100 unit/mL injection, , Disp: , Rfl:   •  ondansetron ODT (Zofran-ODT) 4 mg disintegrating tablet, Take 1 tablet (4 mg) by mouth see administration instructions., Disp: , Rfl:   •  Praluent Pen 75 mg/mL pen injector, INJECT SUBCUTANEOUSLY ONCE EVERY 2 WEEKS, Disp: 6 Pen, Rfl: 3                     Assessment/Plan   1. Benign essential hypertension  Follow Up In Cardiology    CBC    Basic Metabolic Panel    CBC    Basic Metabolic Panel      2. Hyperlipidemia, unspecified hyperlipidemia type  Lipid Panel    Lipid Panel      3. Statin intolerance  Lipid Panel    Lipid Panel      4. PVD (peripheral vascular disease) (CMS/HCC)  Lipid Panel    CBC    Lipid Panel    CBC      5. Intermittent claudication (CMS/[facility])        6. Diabetes mellitus of other type without complication, unspecified whether long term insulin use (CMS/[facility])        7. Former smoker                 Scribe Attestation  By signing my name below, Kimberly BEDOLLA LPN, Scribe   attest that this documentation has been prepared under the direction and in the presence of Annette Zabala MD.     Provider Attestation - Scribe documentation    All medical record entries made by the Scribe were at my direction and personally dictated by me. I have reviewed the chart and agree that the record accurately reflects my personal performance of the history, physical exam, discussion and plan.

## 2024-04-11 NOTE — PATIENT INSTRUCTIONS
Please bring all medicines, vitamins, and herbal supplements with you when you come to the office.    Prescriptions will not be filled unless you are compliant with your follow up appointments or have a follow up appointment scheduled as per instruction of your physician. Refills should be requested at the time of your visit.     Lab work  Same medications  Follow up 6 months

## 2024-04-23 ENCOUNTER — HOSPITAL ENCOUNTER (OUTPATIENT)
Dept: PULMONOLOGY | Age: 59
Discharge: HOME | End: 2024-04-23
Attending: FAMILY MEDICINE
Payer: COMMERCIAL

## 2024-04-23 DIAGNOSIS — J44.9 CHRONIC OBSTRUCTIVE PULMONARY DISEASE, UNSPECIFIED COPD TYPE (HCC): ICD-10-CM

## 2024-04-23 PROCEDURE — 6360000002 HC RX W HCPCS

## 2024-04-23 PROCEDURE — 94060 EVALUATION OF WHEEZING: CPT

## 2024-04-23 PROCEDURE — 94726 PLETHYSMOGRAPHY LUNG VOLUMES: CPT

## 2024-04-23 PROCEDURE — 94729 DIFFUSING CAPACITY: CPT

## 2024-04-23 RX ORDER — ALBUTEROL SULFATE 2.5 MG/3ML
SOLUTION RESPIRATORY (INHALATION)
Status: COMPLETED
Start: 2024-04-23 | End: 2024-04-23

## 2024-04-23 RX ADMIN — ALBUTEROL SULFATE 2.5 MG: 2.5 SOLUTION RESPIRATORY (INHALATION) at 15:06

## 2024-07-29 ENCOUNTER — PATIENT MESSAGE (OUTPATIENT)
Dept: FAMILY MEDICINE CLINIC | Age: 59
End: 2024-07-29

## 2024-07-29 RX ORDER — DOXYCYCLINE HYCLATE 100 MG
100 TABLET ORAL 2 TIMES DAILY
Qty: 20 TABLET | Refills: 0 | Status: SHIPPED | OUTPATIENT
Start: 2024-07-29 | End: 2024-08-08

## 2024-07-29 RX ORDER — METHYLPREDNISOLONE 4 MG/1
TABLET ORAL
Qty: 1 KIT | Refills: 0 | Status: SHIPPED | OUTPATIENT
Start: 2024-07-29

## 2024-07-29 NOTE — TELEPHONE ENCOUNTER
From: Brandan Portillo  To: Dr. Saad Marques  Sent: 7/29/2024 12:15 PM EDT  Subject: Sick    hi Dr Marques is there any chance I could get you to please call me in an antibiotic and a steroid pack? I have been coughing for three weeks. It’s not getting any better thank you.

## 2024-08-20 ENCOUNTER — OFFICE VISIT (OUTPATIENT)
Dept: FAMILY MEDICINE CLINIC | Age: 59
End: 2024-08-20
Payer: MEDICARE

## 2024-08-20 VITALS
BODY MASS INDEX: 22.09 KG/M2 | DIASTOLIC BLOOD PRESSURE: 82 MMHG | TEMPERATURE: 97.6 F | HEART RATE: 84 BPM | SYSTOLIC BLOOD PRESSURE: 130 MMHG | OXYGEN SATURATION: 99 % | WEIGHT: 129.4 LBS | HEIGHT: 64 IN

## 2024-08-20 DIAGNOSIS — E78.2 MIXED HYPERLIPIDEMIA DUE TO TYPE 2 DIABETES MELLITUS (HCC): ICD-10-CM

## 2024-08-20 DIAGNOSIS — F17.210 CIGARETTE NICOTINE DEPENDENCE WITHOUT COMPLICATION: ICD-10-CM

## 2024-08-20 DIAGNOSIS — R30.0 DYSURIA: ICD-10-CM

## 2024-08-20 DIAGNOSIS — I73.9 PERIPHERAL ARTERIAL DISEASE (HCC): ICD-10-CM

## 2024-08-20 DIAGNOSIS — E03.8 OTHER SPECIFIED HYPOTHYROIDISM: ICD-10-CM

## 2024-08-20 DIAGNOSIS — I10 PRIMARY HYPERTENSION: ICD-10-CM

## 2024-08-20 DIAGNOSIS — B37.9 YEAST INFECTION: ICD-10-CM

## 2024-08-20 DIAGNOSIS — E11.69 MIXED HYPERLIPIDEMIA DUE TO TYPE 2 DIABETES MELLITUS (HCC): ICD-10-CM

## 2024-08-20 DIAGNOSIS — Z12.11 SCREENING FOR MALIGNANT NEOPLASM OF COLON: ICD-10-CM

## 2024-08-20 DIAGNOSIS — E10.22 TYPE 1 DIABETES MELLITUS WITH DIABETIC CHRONIC KIDNEY DISEASE, UNSPECIFIED CKD STAGE (HCC): Primary | ICD-10-CM

## 2024-08-20 DIAGNOSIS — Z12.31 SCREENING MAMMOGRAM FOR BREAST CANCER: ICD-10-CM

## 2024-08-20 LAB
BILIRUBIN, POC: NORMAL
BLOOD URINE, POC: NORMAL
CLARITY, POC: CLEAR
COLOR, POC: YELLOW
GLUCOSE URINE, POC: NORMAL
HBA1C MFR BLD: 6.4 %
KETONES, POC: NORMAL
LEUKOCYTE EST, POC: NORMAL
NITRITE, POC: NORMAL
PH, POC: 6
PROTEIN, POC: NORMAL
SPECIFIC GRAVITY, POC: 1
UROBILINOGEN, POC: NORMAL

## 2024-08-20 PROCEDURE — G8427 DOCREV CUR MEDS BY ELIG CLIN: HCPCS | Performed by: FAMILY MEDICINE

## 2024-08-20 PROCEDURE — 2022F DILAT RTA XM EVC RTNOPTHY: CPT | Performed by: FAMILY MEDICINE

## 2024-08-20 PROCEDURE — 81003 URINALYSIS AUTO W/O SCOPE: CPT | Performed by: FAMILY MEDICINE

## 2024-08-20 PROCEDURE — 3079F DIAST BP 80-89 MM HG: CPT | Performed by: FAMILY MEDICINE

## 2024-08-20 PROCEDURE — 1036F TOBACCO NON-USER: CPT | Performed by: FAMILY MEDICINE

## 2024-08-20 PROCEDURE — 3051F HG A1C>EQUAL 7.0%<8.0%: CPT | Performed by: FAMILY MEDICINE

## 2024-08-20 PROCEDURE — 3017F COLORECTAL CA SCREEN DOC REV: CPT | Performed by: FAMILY MEDICINE

## 2024-08-20 PROCEDURE — G8420 CALC BMI NORM PARAMETERS: HCPCS | Performed by: FAMILY MEDICINE

## 2024-08-20 PROCEDURE — 83036 HEMOGLOBIN GLYCOSYLATED A1C: CPT | Performed by: FAMILY MEDICINE

## 2024-08-20 PROCEDURE — 3075F SYST BP GE 130 - 139MM HG: CPT | Performed by: FAMILY MEDICINE

## 2024-08-20 PROCEDURE — 99214 OFFICE O/P EST MOD 30 MIN: CPT | Performed by: FAMILY MEDICINE

## 2024-08-20 RX ORDER — FLUCONAZOLE 150 MG/1
150 TABLET ORAL DAILY
Qty: 30 TABLET | Refills: 0 | Status: SHIPPED | OUTPATIENT
Start: 2024-08-20

## 2024-08-20 RX ORDER — DESVENLAFAXINE 25 MG/1
TABLET, EXTENDED RELEASE ORAL
COMMUNITY
Start: 2024-08-16

## 2024-08-20 RX ORDER — VALSARTAN 80 MG/1
80 TABLET ORAL DAILY
Qty: 90 TABLET | Refills: 1 | Status: SHIPPED | OUTPATIENT
Start: 2024-08-20

## 2024-08-20 RX ORDER — FLUCONAZOLE 150 MG/1
150 TABLET ORAL DAILY
Qty: 30 TABLET | Refills: 0 | Status: SHIPPED | OUTPATIENT
Start: 2024-08-20 | End: 2024-08-20

## 2024-08-20 NOTE — PROGRESS NOTES
OCCLUDED  /  TPA     Social History     Socioeconomic History    Marital status:      Spouse name: Not on file    Number of children: Not on file    Years of education: Not on file    Highest education level: Not on file   Occupational History    Not on file   Tobacco Use    Smoking status: Former     Current packs/day: 0.00     Average packs/day: 1 pack/day for 36.0 years (36.0 ttl pk-yrs)     Types: Cigarettes     Start date:      Quit date:      Years since quittin.6    Smokeless tobacco: Never   Vaping Use    Vaping status: Never Used   Substance and Sexual Activity    Alcohol use: Yes     Comment: \"Couple times a week\"    Drug use: Never    Sexual activity: Not on file   Other Topics Concern    Not on file   Social History Narrative    Not on file     Social Determinants of Health     Financial Resource Strain: Low Risk  (10/11/2023)    Overall Financial Resource Strain (CARDIA)     Difficulty of Paying Living Expenses: Not hard at all   Food Insecurity: Not on file (10/11/2023)   Transportation Needs: Unknown (10/11/2023)    PRAPARE - Transportation     Lack of Transportation (Medical): Not on file     Lack of Transportation (Non-Medical): No   Physical Activity: Not on file   Stress: Not on file   Social Connections: Not on file   Intimate Partner Violence: Unknown (2024)    Received from [facility]    [place] Safety & Environment     Fear of Current or Ex-Partner: Not on file     Emotionally Abused: Not on file     Physically Abused: Not on file     Sexually Abused: Not on file     Physically or Sexually Abused: Not on file   Housing Stability: Unknown (10/11/2023)    Housing Stability Vital Sign     Unable to Pay for Housing in the Last Year: Not on file     Number of Places Lived in the Last Year: Not on file     Unstable Housing in the Last Year: No     Family History   Problem Relation Age of Onset    Other Mother     Other Father     Diabetes Sister      Allergies

## 2024-08-21 ENCOUNTER — PATIENT MESSAGE (OUTPATIENT)
Dept: FAMILY MEDICINE CLINIC | Age: 59
End: 2024-08-21

## 2024-08-21 DIAGNOSIS — R30.0 DYSURIA: ICD-10-CM

## 2024-08-21 NOTE — TELEPHONE ENCOUNTER
Hieu from [facility] calling in requesting script for fluconazole to be revised  Patient is not planning on taking this every day  [facility] with possible refills. They do not think 30 qty is appropriate    [facility] phone [phone]

## 2024-08-23 LAB — BACTERIA UR CULT: NORMAL

## 2024-09-20 ENCOUNTER — TELEPHONE (OUTPATIENT)
Dept: FAMILY MEDICINE CLINIC | Age: 59
End: 2024-09-20

## 2024-09-20 DIAGNOSIS — F17.210 CIGARETTE NICOTINE DEPENDENCE WITHOUT COMPLICATION: Primary | ICD-10-CM

## 2024-09-20 DIAGNOSIS — Z12.11 SCREENING FOR MALIGNANT NEOPLASM OF COLON: ICD-10-CM

## 2024-09-20 DIAGNOSIS — R13.19 ESOPHAGEAL DYSPHAGIA: Primary | ICD-10-CM

## 2024-09-29 NOTE — TELEPHONE ENCOUNTER
requesting medication refill. Please approve or deny this request.    Rx requested:  Requested Prescriptions     Pending Prescriptions Disp Refills    insulin aspart (NOVOLOG) 100 UNIT/ML injection vial [Pharmacy Med Name: NOVOLOG 100 UNIT/ML VIAL] 10 mL 5     Sig: DOSING PER INSULIN PUMP. MAX DOSING 37 UNITS PER DAY         Last Office Visit:   8/20/2024      Next Visit Date:  Future Appointments   Date Time Provider Department Center   10/17/2024  1:00 PM EM CT ROOM 1 [facility] Fac RAD   10[facility]17[facility]2024  [facility] Fac RAD   10/17/2024  3:30 PM Herman Bernal MD [place] [facility]   2/18/2025  3:30 PM Saad Marques DO [facility] DEP

## 2024-09-30 RX ORDER — INSULIN ASPART 100 [IU]/ML
INJECTION, SOLUTION INTRAVENOUS; SUBCUTANEOUS
Qty: 10 ML | Refills: 5 | Status: SHIPPED | OUTPATIENT
Start: 2024-09-30

## 2024-10-09 RX ORDER — LEVOTHYROXINE SODIUM 50 UG/1
50 TABLET ORAL DAILY
Qty: 90 TABLET | Refills: 3 | Status: SHIPPED | OUTPATIENT
Start: 2024-10-09

## 2024-10-09 NOTE — TELEPHONE ENCOUNTER
Pharmacy requesting medication refill. Please approve or deny this request.    Rx requested:  Requested Prescriptions     Pending Prescriptions Disp Refills    levothyroxine (SYNTHROID) 50 MCG tablet [Pharmacy Med Name: LEVOTHYROXINE 50 MCG TABLET] 90 tablet 3     Sig: TAKE 1 TABLET BY MOUTH EVERY DAY         Last Office Visit:   8/20/2024      Next Visit Date:  Future Appointments   Date Time Provider Department Center   10/17/2024  1:00 PM EM CT ROOM 1 [facility] Fac RAD   10/17/2024  2:20 PM EM [facility] ROOM 1 [facility] RAD   10/17/2024  3:30 PM [facility] [place]   2/18/2025  3:30 PM Saad Marques DO [facility] DEP

## 2024-10-12 DIAGNOSIS — I10 BENIGN ESSENTIAL HYPERTENSION: ICD-10-CM

## 2024-10-14 RX ORDER — METOPROLOL TARTRATE 25 MG/1
25 TABLET, FILM COATED ORAL DAILY
Qty: 90 TABLET | Refills: 3 | Status: SHIPPED | OUTPATIENT
Start: 2024-10-14

## 2024-10-16 ENCOUNTER — PATIENT MESSAGE (OUTPATIENT)
Dept: FAMILY MEDICINE CLINIC | Age: 59
End: 2024-10-16

## 2024-10-16 RX ORDER — METHYLPREDNISOLONE 4 MG
TABLET, DOSE PACK ORAL
Qty: 1 KIT | Refills: 0 | Status: SHIPPED | OUTPATIENT
Start: 2024-10-16

## 2024-10-16 RX ORDER — DOXYCYCLINE HYCLATE 100 MG
100 TABLET ORAL 2 TIMES DAILY
Qty: 20 TABLET | Refills: 0 | Status: SHIPPED | OUTPATIENT
Start: 2024-10-16 | End: 2024-10-26

## 2024-10-17 ENCOUNTER — OFFICE VISIT (OUTPATIENT)
Dept: GASTROENTEROLOGY | Age: 59
End: 2024-10-17
Payer: MEDICARE

## 2024-10-17 ENCOUNTER — HOSPITAL ENCOUNTER (OUTPATIENT)
Dept: WOMENS IMAGING | Age: 59
Discharge: HOME | End: 2024-10-19
Attending: FAMILY MEDICINE
Payer: MEDICARE

## 2024-10-17 ENCOUNTER — PREP FOR PROCEDURE (OUTPATIENT)
Dept: GASTROENTEROLOGY | Age: 59
End: 2024-10-17

## 2024-10-17 ENCOUNTER — HOSPITAL ENCOUNTER (OUTPATIENT)
Dept: CT IMAGING | Age: 59
Discharge: HOME | End: 2024-10-19
Attending: FAMILY MEDICINE
Payer: MEDICARE

## 2024-10-17 VITALS — BODY MASS INDEX: 21.07 KG/M2 | OXYGEN SATURATION: 98 % | WEIGHT: 123.4 LBS | HEIGHT: 64 IN | HEART RATE: 62 BPM

## 2024-10-17 DIAGNOSIS — I10 PRIMARY HYPERTENSION: ICD-10-CM

## 2024-10-17 DIAGNOSIS — F17.210 CIGARETTE NICOTINE DEPENDENCE WITHOUT COMPLICATION: ICD-10-CM

## 2024-10-17 DIAGNOSIS — K21.9 GASTROESOPHAGEAL REFLUX DISEASE, UNSPECIFIED WHETHER ESOPHAGITIS PRESENT: ICD-10-CM

## 2024-10-17 DIAGNOSIS — Z98.890 HISTORY OF RECONSTRUCTIVE REPAIR OF RECTOCELE: ICD-10-CM

## 2024-10-17 DIAGNOSIS — E11.69 MIXED HYPERLIPIDEMIA DUE TO TYPE 2 DIABETES MELLITUS (HCC): ICD-10-CM

## 2024-10-17 DIAGNOSIS — R13.19 ESOPHAGEAL DYSPHAGIA: Primary | ICD-10-CM

## 2024-10-17 DIAGNOSIS — R13.19 ESOPHAGEAL DYSPHAGIA: ICD-10-CM

## 2024-10-17 DIAGNOSIS — Z12.31 SCREENING MAMMOGRAM FOR BREAST CANCER: ICD-10-CM

## 2024-10-17 DIAGNOSIS — E78.2 MIXED HYPERLIPIDEMIA DUE TO TYPE 2 DIABETES MELLITUS (HCC): ICD-10-CM

## 2024-10-17 DIAGNOSIS — E03.8 OTHER SPECIFIED HYPOTHYROIDISM: ICD-10-CM

## 2024-10-17 DIAGNOSIS — Z86.0100 HISTORY OF COLON POLYPS: ICD-10-CM

## 2024-10-17 DIAGNOSIS — I73.9 PERIPHERAL ARTERIAL DISEASE (HCC): ICD-10-CM

## 2024-10-17 LAB
ALBUMIN SERPL-MCNC: 4 G/DL (ref 3.5–4.6)
ALP SERPL-CCNC: 133 U/L (ref 40–130)
ALT SERPL-CCNC: 13 U/L (ref 0–33)
ANION GAP SERPL CALCULATED.3IONS-SCNC: 13 MEQ/L (ref 9–15)
AST SERPL-CCNC: 17 U/L (ref 0–35)
BASOPHILS # BLD: 0 K/UL (ref 0–0.2)
BASOPHILS NFR BLD: 0.6 %
BILIRUB SERPL-MCNC: 0.3 MG/DL (ref 0.2–0.7)
BUN SERPL-MCNC: 8 MG/DL (ref 6–20)
CALCIUM SERPL-MCNC: 9.1 MG/DL (ref 8.5–9.9)
CHLORIDE SERPL-SCNC: 100 MEQ/L (ref 95–107)
CHOLEST SERPL-MCNC: 150 MG/DL (ref 0–199)
CO2 SERPL-SCNC: 25 MEQ/L (ref 20–31)
CREAT SERPL-MCNC: 0.71 MG/DL (ref 0.5–0.9)
EOSINOPHIL # BLD: 0.1 K/UL (ref 0–0.7)
EOSINOPHIL NFR BLD: 1.9 %
ERYTHROCYTE [DISTWIDTH] IN BLOOD BY AUTOMATED COUNT: 13.7 % (ref 11.5–14.5)
GLOBULIN SER CALC-MCNC: 2.4 G/DL (ref 2.3–3.5)
GLUCOSE SERPL-MCNC: 167 MG/DL (ref 70–99)
HCT VFR BLD AUTO: 48.1 % (ref 37–47)
HDLC SERPL-MCNC: 81 MG/DL (ref 40–59)
HGB BLD-MCNC: 16.8 G/DL (ref 12–16)
LDLC SERPL CALC-MCNC: 54 MG/DL (ref 0–129)
LYMPHOCYTES # BLD: 1.3 K/UL (ref 1–4.8)
LYMPHOCYTES NFR BLD: 20.9 %
MCH RBC QN AUTO: 33.1 PG (ref 27–31.3)
MCHC RBC AUTO-ENTMCNC: 34.9 % (ref 33–37)
MCV RBC AUTO: 94.9 FL (ref 79.4–94.8)
MONOCYTES # BLD: 0.5 K/UL (ref 0.2–0.8)
MONOCYTES NFR BLD: 7.1 %
NEUTROPHILS # BLD: 4.4 K/UL (ref 1.4–6.5)
NEUTS SEG NFR BLD: 69.3 %
PLATELET # BLD AUTO: 321 K/UL (ref 130–400)
POTASSIUM SERPL-SCNC: 4.1 MEQ/L (ref 3.4–4.9)
PROT SERPL-MCNC: 6.4 G/DL (ref 6.3–8)
RBC # BLD AUTO: 5.07 M/UL (ref 4.2–5.4)
SODIUM SERPL-SCNC: 138 MEQ/L (ref 135–144)
TRIGL SERPL-MCNC: 77 MG/DL (ref 0–150)
TSH REFLEX: 2.17 UIU/ML (ref 0.44–3.86)
WBC # BLD AUTO: 6.4 K/UL (ref 4.8–10.8)

## 2024-10-17 PROCEDURE — 77063 BREAST TOMOSYNTHESIS BI: CPT

## 2024-10-17 PROCEDURE — 99204 OFFICE O/P NEW MOD 45 MIN: CPT | Performed by: INTERNAL MEDICINE

## 2024-10-17 PROCEDURE — 71271 CT THORAX LUNG CANCER SCR C-: CPT

## 2024-10-17 RX ORDER — SODIUM CHLORIDE 9 MG/ML
INJECTION, SOLUTION INTRAVENOUS PRN
Status: CANCELLED | OUTPATIENT
Start: 2024-10-17

## 2024-10-17 RX ORDER — SODIUM CHLORIDE 9 MG/ML
INJECTION, SOLUTION INTRAVENOUS CONTINUOUS
Status: CANCELLED | OUTPATIENT
Start: 2024-10-17

## 2024-10-17 RX ORDER — FLUCONAZOLE 150 MG/1
150 TABLET ORAL DAILY
Qty: 30 TABLET | Refills: 0 | Status: SHIPPED | OUTPATIENT
Start: 2024-10-17

## 2024-10-17 RX ORDER — SODIUM CHLORIDE 0.9 % (FLUSH) 0.9 %
5-40 SYRINGE (ML) INJECTION EVERY 12 HOURS SCHEDULED
Status: CANCELLED | OUTPATIENT
Start: 2024-10-17

## 2024-10-17 RX ORDER — SODIUM CHLORIDE 0.9 % (FLUSH) 0.9 %
5-40 SYRINGE (ML) INJECTION PRN
Status: CANCELLED | OUTPATIENT
Start: 2024-10-17

## 2024-10-17 RX ORDER — PANTOPRAZOLE SODIUM 40 MG/1
40 TABLET, DELAYED RELEASE ORAL DAILY
Qty: 30 TABLET | Refills: 3 | Status: SHIPPED | OUTPATIENT
Start: 2024-10-17

## 2024-10-17 NOTE — PROGRESS NOTES
female with patient with progressively worsening dysphagia to solids and liquids worsening over the last 3 to 4 months occurring 2-3 times a week.  Patient reports painful swallowing and lower esophagus with both solids and liquids.  Patient has significant history with surgical intervention for rectocele with complications and eventually having mesh removed occurring in 2012 at [facility] in [place].  1. Gastroesophageal reflux disease, unspecified whether esophagitis present  2. Esophageal dysphagia  - pantoprazole (PROTONIX) 40 MG tablet; Take 1 tablet by mouth daily  Dispense: 30 tablet; Refill: 3  - Advised on the correct dose and timing of the PPI, Preferably 1/2 hour before breakfast. If symptoms are worse at night would recommend taking it half an hour before dinner.  - Pathophysiology and etiology of reflux discussed at length, with help of images.  - Anti-reflux lifestyle modification discussed at length   --Avoid spicy acidic based foods   --Limit coffee, tea, alcohol use   --Limit the amount of food during meal time, avoid gorging   --Avoid bedtime snacks and eat meals 3 to 4 hrs before lying down   --Stop (or atleast cut down) Smoking.   --Elevate the head of the bed with blocks  - EGD requested to assess/exclude for esophagitis/hiatal hernia. (Procedure risks and instruction discussed).     3. History of reconstructive repair of rectocele  4. History of colon polyps  - FL BARIUM ENEMA; Future  -Recommend referral to GYN    Return in about 2 weeks (around 10/31/2024) for  after procedure .    Herman Bernal MD   Staff Gastroenterologist  [facility]    Please note this report has been partially produced using speech recognition software and may cause or contain errors related to thatsystem including grammar, punctuation and spelling as well as words and phrases that may seem inappropriate. If there are questions or concerns please feel free to contact me to clarify.

## 2024-10-25 ENCOUNTER — TELEPHONE (OUTPATIENT)
Dept: CARDIOLOGY | Facility: CLINIC | Age: 59
End: 2024-10-25
Payer: MEDICAID

## 2024-10-25 NOTE — TELEPHONE ENCOUNTER
Prior Authorization for Praluent sent to plan under Covermymeds Key:[id number]  This request has received a Favorable outcome.    Please note any additional information provided by Ugo at the bottom of this request.

## 2024-11-01 ENCOUNTER — TELEPHONE (OUTPATIENT)
Dept: FAMILY MEDICINE CLINIC | Age: 59
End: 2024-11-01

## 2024-11-01 RX ORDER — INSULIN LISPRO 100 [IU]/ML
INJECTION, SOLUTION INTRAVENOUS; SUBCUTANEOUS
Refills: 0 | OUTPATIENT
Start: 2024-11-01

## 2024-11-01 RX ORDER — INSULIN LISPRO 100 [IU]/ML
INJECTION, SOLUTION INTRAVENOUS; SUBCUTANEOUS
Qty: 10 ML | Refills: 5 | Status: SHIPPED | OUTPATIENT
Start: 2024-11-01

## 2024-11-01 NOTE — TELEPHONE ENCOUNTER
[facility] in [place] called in stating that they need a new script sent to them.    Patient has been on Novolog but, it needs to be changed to Humalog due to the patient's insurance.     [place] 08/20/2024  [place] 02/18/2025

## 2024-11-06 ENCOUNTER — APPOINTMENT (OUTPATIENT)
Dept: CARDIOLOGY | Facility: CLINIC | Age: 59
End: 2024-11-06
Payer: COMMERCIAL

## 2024-11-06 VITALS
HEIGHT: 64 IN | DIASTOLIC BLOOD PRESSURE: 86 MMHG | WEIGHT: 124 LBS | SYSTOLIC BLOOD PRESSURE: 132 MMHG | BODY MASS INDEX: 21.17 KG/M2 | HEART RATE: 76 BPM

## 2024-11-06 DIAGNOSIS — E13.9 DIABETES MELLITUS OF OTHER TYPE WITHOUT COMPLICATION, UNSPECIFIED WHETHER LONG TERM INSULIN USE (MULTI): ICD-10-CM

## 2024-11-06 DIAGNOSIS — E78.5 HYPERLIPIDEMIA, UNSPECIFIED HYPERLIPIDEMIA TYPE: ICD-10-CM

## 2024-11-06 DIAGNOSIS — Z87.891 FORMER SMOKER: ICD-10-CM

## 2024-11-06 DIAGNOSIS — I10 BENIGN ESSENTIAL HYPERTENSION: ICD-10-CM

## 2024-11-06 DIAGNOSIS — I73.9 PVD (PERIPHERAL VASCULAR DISEASE) (CMS-HCC): ICD-10-CM

## 2024-11-06 DIAGNOSIS — Z78.9 STATIN INTOLERANCE: ICD-10-CM

## 2024-11-06 PROCEDURE — 99214 OFFICE O/P EST MOD 30 MIN: CPT | Performed by: INTERNAL MEDICINE

## 2024-11-06 PROCEDURE — 3079F DIAST BP 80-89 MM HG: CPT | Performed by: INTERNAL MEDICINE

## 2024-11-06 PROCEDURE — 3008F BODY MASS INDEX DOCD: CPT | Performed by: INTERNAL MEDICINE

## 2024-11-06 PROCEDURE — 1036F TOBACCO NON-USER: CPT | Performed by: INTERNAL MEDICINE

## 2024-11-06 PROCEDURE — 3075F SYST BP GE 130 - 139MM HG: CPT | Performed by: INTERNAL MEDICINE

## 2024-11-06 RX ORDER — LOSARTAN POTASSIUM 100 MG/1
100 TABLET ORAL DAILY
COMMUNITY
End: 2024-11-06 | Stop reason: ALTCHOICE

## 2024-11-06 RX ORDER — DESVENLAFAXINE SUCCINATE 25 MG/1
25 TABLET, EXTENDED RELEASE ORAL DAILY
COMMUNITY
Start: 2024-08-16

## 2024-11-06 RX ORDER — LISDEXAMFETAMINE DIMESYLATE 50 MG/1
50 CAPSULE ORAL EVERY MORNING
COMMUNITY

## 2024-11-06 RX ORDER — AMLODIPINE AND VALSARTAN 5; 160 MG/1; MG/1
1 TABLET ORAL DAILY
Qty: 90 TABLET | Refills: 3 | Status: SHIPPED | OUTPATIENT
Start: 2024-11-06 | End: 2025-11-06

## 2024-11-06 RX ORDER — DESVENLAFAXINE 50 MG/1
50 TABLET, FILM COATED, EXTENDED RELEASE ORAL DAILY
COMMUNITY
Start: 2024-06-24

## 2024-11-06 NOTE — PATIENT INSTRUCTIONS
Please bring all medicines, vitamins, and herbal supplements with you when you come to the office.    Prescriptions will not be filled unless you are compliant with your follow up appointments or have a follow up appointment scheduled as per instruction of your physician. Refills should be requested at the time of your visit.     Stop Norvasc  Stop losartan  Start Exforge   Follow up 6 months

## 2024-11-06 NOTE — PROGRESS NOTES
"Subjective   Venecia Campos is a 59 y.o. female       Chief Complaint    Follow-up          HPI   Patient is in the office for follow-up for severe vascular disease as noted below.  Since her last visit she has remained stable with no indication of any recent cardiovascular events.  Lab data from October 2024 were reviewed and her numbers look on target.  She does not smoke and has been highly motivated and has been compliant with medical therapy.  She question the safety of taking estrogen therapy at her age for postmenopausal symptoms.  I asked advised her to discuss that with her OB/GYN and indicated that from a cardiac vascular standpoint we do not favor these medications.  Short-term use of dual could be tried.    ASSESSMENT AND PLAN:      1.  Essential hypertension, currently under control medical therapy which will be left unchanged, basic metabolic profile is ordered  2. Hyperlipidemia. Intolerant to statin.  Under excellent control on Praluent  3.  Type II diabetes, managed by , seems to be  under control.  4. Severe PAD mostly involving the left lower extremity status post surgical revascularization 2022 followed by vascular surgery in [facility] in [place]. Aggressive risk factor modification was emphasized.  Patient could not afford vascular dose Xarelto due to cost and she is taking aspirin Plavix only.  Review of Systems   Cardiovascular:  Positive for leg swelling.   All other systems reviewed and are negative.           Vitals:    11/06/24 1113   BP: 132/86   BP Location: Right arm   Patient Position: Sitting   Pulse: 76   Weight: 56.2 kg (124 lb)   Height: 1.626 m (5' 4\")        Objective   Physical Exam  Constitutional:       Appearance: Normal appearance.   HENT:      Nose: Nose normal.   Neck:      Vascular: No carotid bruit.   Cardiovascular:      Rate and Rhythm: Normal rate.      Pulses: Normal pulses.      Heart sounds: Normal heart sounds.   Pulmonary:      Effort: Pulmonary effort is " normal.   Abdominal:      General: Bowel sounds are normal.      Palpations: Abdomen is soft.   Musculoskeletal:         General: Normal range of motion.      Cervical back: Normal range of motion.      Right lower leg: No edema.      Left lower leg: No edema.   Skin:     General: Skin is warm and dry.   Neurological:      General: No focal deficit present.      Mental Status: She is alert.   Psychiatric:         Mood and Affect: Mood normal.         Behavior: Behavior normal.         Thought Content: Thought content normal.         Judgment: Judgment normal.         Allergies  Latex and Statins-hmg-coa reductase inhibitors     Current Medications    Current Outpatient Medications:     albuterol 90 mcg/actuation inhaler, Inhale 2 puffs every 4 hours if needed for wheezing., Disp: , Rfl:     aspirin 81 mg EC tablet, Take 1 tablet (81 mg) by mouth once daily., Disp: , Rfl:     cetirizine (ZyrTEC) 10 mg tablet, Take 1 tablet (10 mg) by mouth if needed., Disp: , Rfl:     clopidogrel (Plavix) 75 mg tablet, Take 1 tablet (75 mg) by mouth once daily., Disp: , Rfl:     desvenlafaxine 50 mg 24 hr tablet, Take 1 tablet (50 mg) by mouth once daily., Disp: , Rfl:     desvenlafaxine succinate (Pristiq) 25 mg 24 hour tablet, Take 1 tablet (25 mg) by mouth once daily., Disp: , Rfl:     dupilumab (DUPIXENT PEN SUBQ), Inject under the skin every 14 (fourteen) days., Disp: , Rfl:     ibuprofen 800 mg tablet, Take 1 tablet (800 mg) by mouth if needed for moderate pain (4 - 6)., Disp: , Rfl:     lisdexamfetamine (Vyvanse) 50 mg capsule, Take 1 capsule (50 mg) by mouth once daily in the morning., Disp: , Rfl:     metoprolol tartrate (Lopressor) 25 mg tablet, TAKE 1 TABLET DAILY, Disp: 90 tablet, Rfl: 3    NovoLOG U-100 Insulin aspart 100 unit/mL injection, , Disp: , Rfl:     ondansetron ODT (Zofran-ODT) 4 mg disintegrating tablet, Take 1 tablet (4 mg) by mouth if needed for nausea or vomiting., Disp: , Rfl:     Praluent Pen 75 mg/mL pen  injector, INJECT SUBCUTANEOUSLY ONCE EVERY 2 WEEKS, Disp: 6 Pen, Rfl: 3    amlodipine-valsartan (Exforge) 5-160 mg tablet, Take 1 tablet by mouth once daily., Disp: 90 tablet, Rfl: 3                     Assessment/Plan   1. Benign essential hypertension  Follow Up In Cardiology    amlodipine-valsartan (Exforge) 5-160 mg tablet    Follow Up In Cardiology      2. Hyperlipidemia, unspecified hyperlipidemia type        3. Statin intolerance        4. PVD (peripheral vascular disease) (CMS-HCC)        5. Diabetes mellitus of other type without complication, unspecified whether long term insulin use (Multi)        6. Former smoker        7. BMI 21.0-21.9, adult                 Scribe Attestation  By signing my name below, IKimberly LPN, Scribe   attest that this documentation has been prepared under the direction and in the presence of Annette Zabala MD.     Provider Attestation - Scribe documentation    All medical record entries made by the Scribe were at my direction and personally dictated by me. I have reviewed the chart and agree that the record accurately reflects my personal performance of the history, physical exam, discussion and plan.

## 2024-11-06 NOTE — LETTER
November 6, 2024     John Henriquez DO  [address]  [facility] [id number]    Patient: Venecia Campos   YOB: 1965   Date of Visit: 11/6/2024       Dear Dr. John Henriquez DO:    Thank you for referring Venecia Campos to me for evaluation. Below are my notes for this consultation.  If you have questions, please do not hesitate to call me. I look forward to following your patient along with you.       Sincerely,     Annette Zabala MD      CC: No Recipients  ______________________________________________________________________________________    Subjective   Venecia Campos is a 59 y.o. female       Chief Complaint    Follow-up          HPI   Patient is in the office for follow-up for severe vascular disease as noted below.  Since her last visit she has remained stable with no indication of any recent cardiovascular events.  Lab data from October 2024 were reviewed and her numbers look on target.  She does not smoke and has been highly motivated and has been compliant with medical therapy.  She question the safety of taking estrogen therapy at her age for postmenopausal symptoms.  I asked advised her to discuss that with her OB/GYN and indicated that from a cardiac vascular standpoint we do not favor these medications.  Short-term use of dual could be tried.    ASSESSMENT AND PLAN:      1.  Essential hypertension, currently under control medical therapy which will be left unchanged, basic metabolic profile is ordered  2. Hyperlipidemia. Intolerant to statin.  Under excellent control on Praluent  3.  Type II diabetes, managed by , seems to be  under control.  4. Severe PAD mostly involving the left lower extremity status post surgical revascularization 2022 followed by vascular surgery in [facility] in [place]. Aggressive risk factor modification was emphasized.  Patient could not afford vascular dose Xarelto due to cost and she is taking  "aspirin Plavix only.  Review of Systems   Cardiovascular:  Positive for leg swelling.   All other systems reviewed and are negative.           Vitals:    11/06/24 1113   BP: 132/86   BP Location: Right arm   Patient Position: Sitting   Pulse: 76   Weight: 56.2 kg (124 lb)   Height: 1.626 m (5' 4\")        Objective   Physical Exam  Constitutional:       Appearance: Normal appearance.   HENT:      Nose: Nose normal.   Neck:      Vascular: No carotid bruit.   Cardiovascular:      Rate and Rhythm: Normal rate.      Pulses: Normal pulses.      Heart sounds: Normal heart sounds.   Pulmonary:      Effort: Pulmonary effort is normal.   Abdominal:      General: Bowel sounds are normal.      Palpations: Abdomen is soft.   Musculoskeletal:         General: Normal range of motion.      Cervical back: Normal range of motion.      Right lower leg: No edema.      Left lower leg: No edema.   Skin:     General: Skin is warm and dry.   Neurological:      General: No focal deficit present.      Mental Status: She is alert.   Psychiatric:         Mood and Affect: Mood normal.         Behavior: Behavior normal.         Thought Content: Thought content normal.         Judgment: Judgment normal.         Allergies  Latex and Statins-hmg-coa reductase inhibitors     Current Medications    Current Outpatient Medications:   •  albuterol 90 mcg/actuation inhaler, Inhale 2 puffs every 4 hours if needed for wheezing., Disp: , Rfl:   •  aspirin 81 mg EC tablet, Take 1 tablet (81 mg) by mouth once daily., Disp: , Rfl:   •  cetirizine (ZyrTEC) 10 mg tablet, Take 1 tablet (10 mg) by mouth if needed., Disp: , Rfl:   •  clopidogrel (Plavix) 75 mg tablet, Take 1 tablet (75 mg) by mouth once daily., Disp: , Rfl:   •  desvenlafaxine 50 mg 24 hr tablet, Take 1 tablet (50 mg) by mouth once daily., Disp: , Rfl:   •  desvenlafaxine succinate (Pristiq) 25 mg 24 hour tablet, Take 1 tablet (25 mg) by mouth once daily., Disp: , Rfl:   •  dupilumab (DUPIXENT PEN " SUBQ), Inject under the skin every 14 (fourteen) days., Disp: , Rfl:   •  ibuprofen 800 mg tablet, Take 1 tablet (800 mg) by mouth if needed for moderate pain (4 - 6)., Disp: , Rfl:   •  lisdexamfetamine (Vyvanse) 50 mg capsule, Take 1 capsule (50 mg) by mouth once daily in the morning., Disp: , Rfl:   •  metoprolol tartrate (Lopressor) 25 mg tablet, TAKE 1 TABLET DAILY, Disp: 90 tablet, Rfl: 3  •  NovoLOG U-100 Insulin aspart 100 unit/mL injection, , Disp: , Rfl:   •  ondansetron ODT (Zofran-ODT) 4 mg disintegrating tablet, Take 1 tablet (4 mg) by mouth if needed for nausea or vomiting., Disp: , Rfl:   •  Praluent Pen 75 mg/mL pen injector, INJECT SUBCUTANEOUSLY ONCE EVERY 2 WEEKS, Disp: 6 Pen, Rfl: 3  •  amlodipine-valsartan (Exforge) 5-160 mg tablet, Take 1 tablet by mouth once daily., Disp: 90 tablet, Rfl: 3                     Assessment/Plan   1. Benign essential hypertension  Follow Up In Cardiology    amlodipine-valsartan (Exforge) 5-160 mg tablet    Follow Up In Cardiology      2. Hyperlipidemia, unspecified hyperlipidemia type        3. Statin intolerance        4. PVD (peripheral vascular disease) (CMS-HCC)        5. Diabetes mellitus of other type without complication, unspecified whether long term insulin use (Multi)        6. Former smoker        7. BMI 21.0-21.9, adult                 Scribe Attestation  By signing my name below, Kimberly BEDOLLA LPN, Scribe   attest that this documentation has been prepared under the direction and in the presence of Annette Zabala MD.     Provider Attestation - Scribe documentation    All medical record entries made by the Scribe were at my direction and personally dictated by me. I have reviewed the chart and agree that the record accurately reflects my personal performance of the history, physical exam, discussion and plan.

## 2024-12-06 ENCOUNTER — ANESTHESIA (OUTPATIENT)
Dept: ENDOSCOPY | Age: 59
End: 2024-12-06
Payer: MEDICARE

## 2024-12-06 ENCOUNTER — HOSPITAL ENCOUNTER (OUTPATIENT)
Age: 59
Setting detail: OUTPATIENT SURGERY
Discharge: HOME | End: 2024-12-06
Attending: INTERNAL MEDICINE | Admitting: INTERNAL MEDICINE
Payer: MEDICARE

## 2024-12-06 ENCOUNTER — ANESTHESIA EVENT (OUTPATIENT)
Dept: ENDOSCOPY | Age: 59
End: 2024-12-06
Payer: MEDICARE

## 2024-12-06 VITALS
TEMPERATURE: 97.8 F | WEIGHT: 120 LBS | HEIGHT: 64 IN | DIASTOLIC BLOOD PRESSURE: 62 MMHG | BODY MASS INDEX: 20.49 KG/M2 | SYSTOLIC BLOOD PRESSURE: 103 MMHG | OXYGEN SATURATION: 98 % | HEART RATE: 68 BPM | RESPIRATION RATE: 18 BRPM

## 2024-12-06 DIAGNOSIS — K21.9 GASTROESOPHAGEAL REFLUX DISEASE, UNSPECIFIED WHETHER ESOPHAGITIS PRESENT: ICD-10-CM

## 2024-12-06 DIAGNOSIS — R13.19 ESOPHAGEAL DYSPHAGIA: ICD-10-CM

## 2024-12-06 LAB
GLUCOSE BLD-MCNC: 134 MG/DL (ref 70–99)
PERFORMED ON: ABNORMAL

## 2024-12-06 PROCEDURE — 3609017100 HC EGD: Performed by: INTERNAL MEDICINE

## 2024-12-06 PROCEDURE — 7100000010 HC PHASE II RECOVERY - FIRST 15 MIN: Performed by: INTERNAL MEDICINE

## 2024-12-06 PROCEDURE — 6360000002 HC RX W HCPCS: Performed by: NURSE ANESTHETIST, CERTIFIED REGISTERED

## 2024-12-06 PROCEDURE — 2580000003 HC RX 258: Performed by: INTERNAL MEDICINE

## 2024-12-06 PROCEDURE — 7100000011 HC PHASE II RECOVERY - ADDTL 15 MIN: Performed by: INTERNAL MEDICINE

## 2024-12-06 PROCEDURE — 3700000000 HC ANESTHESIA ATTENDED CARE: Performed by: INTERNAL MEDICINE

## 2024-12-06 PROCEDURE — 3700000001 HC ADD 15 MINUTES (ANESTHESIA): Performed by: INTERNAL MEDICINE

## 2024-12-06 PROCEDURE — 2709999900 HC NON-CHARGEABLE SUPPLY: Performed by: INTERNAL MEDICINE

## 2024-12-06 RX ORDER — FAMOTIDINE 20 MG/1
20 TABLET, FILM COATED ORAL 2 TIMES DAILY
Qty: 60 TABLET | Refills: 3 | Status: SHIPPED | OUTPATIENT
Start: 2024-12-06

## 2024-12-06 RX ORDER — LISDEXAMFETAMINE DIMESYLATE 50 MG/1
50 CAPSULE ORAL EVERY MORNING
COMMUNITY

## 2024-12-06 RX ORDER — PROPOFOL 10 MG/ML
INJECTION, EMULSION INTRAVENOUS
Status: DISCONTINUED | OUTPATIENT
Start: 2024-12-06 | End: 2024-12-06 | Stop reason: SDUPTHER

## 2024-12-06 RX ORDER — PANTOPRAZOLE SODIUM 40 MG/1
40 TABLET, DELAYED RELEASE ORAL DAILY
Qty: 30 TABLET | Refills: 3 | Status: SHIPPED | OUTPATIENT
Start: 2024-12-06

## 2024-12-06 RX ORDER — LIDOCAINE HYDROCHLORIDE 20 MG/ML
INJECTION, SOLUTION INFILTRATION; PERINEURAL
Status: DISCONTINUED | OUTPATIENT
Start: 2024-12-06 | End: 2024-12-06 | Stop reason: SDUPTHER

## 2024-12-06 RX ORDER — SODIUM CHLORIDE 9 MG/ML
INJECTION, SOLUTION INTRAVENOUS PRN
Status: DISCONTINUED | OUTPATIENT
Start: 2024-12-06 | End: 2024-12-06 | Stop reason: HOSPADM

## 2024-12-06 RX ORDER — SODIUM CHLORIDE 0.9 % (FLUSH) 0.9 %
5-40 SYRINGE (ML) INJECTION EVERY 12 HOURS SCHEDULED
Status: DISCONTINUED | OUTPATIENT
Start: 2024-12-06 | End: 2024-12-06 | Stop reason: HOSPADM

## 2024-12-06 RX ORDER — SODIUM CHLORIDE 0.9 % (FLUSH) 0.9 %
5-40 SYRINGE (ML) INJECTION PRN
Status: DISCONTINUED | OUTPATIENT
Start: 2024-12-06 | End: 2024-12-06 | Stop reason: HOSPADM

## 2024-12-06 RX ORDER — SODIUM CHLORIDE 9 MG/ML
INJECTION, SOLUTION INTRAVENOUS CONTINUOUS
Status: DISCONTINUED | OUTPATIENT
Start: 2024-12-06 | End: 2024-12-06 | Stop reason: HOSPADM

## 2024-12-06 RX ADMIN — LIDOCAINE HYDROCHLORIDE 60 MG: 20 INJECTION, SOLUTION INFILTRATION; PERINEURAL at 12:23

## 2024-12-06 RX ADMIN — PROPOFOL 60 MG: 10 INJECTION, EMULSION INTRAVENOUS at 12:23

## 2024-12-06 RX ADMIN — PROPOFOL 60 MG: 10 INJECTION, EMULSION INTRAVENOUS at 12:28

## 2024-12-06 ASSESSMENT — PAIN - FUNCTIONAL ASSESSMENT
PAIN_FUNCTIONAL_ASSESSMENT: 0-10
PAIN_FUNCTIONAL_ASSESSMENT: 0-10

## 2024-12-06 NOTE — H&P
Patient Name: Brandan Portillo  : 1965  MRN: [id number]  DATE: 24      ENDOSCOPY  History and Physical    Procedure:    [] Diagnostic Colonoscopy       [] Screening Colonoscopy  [x] EGD      [] ERCP      [] EUS       [] Other    [x] Previous office notes/History and Physical reviewed from the patients chart. Please see EMR for further details of HPI. I have examined the patient's status immediately prior to the procedure and:      Indications/HPI:    []Abdominal Pain   []Cancer- GI/Lung  []Fhx of colon CA  []History of Polyps   []Villa’s   []Melena  []Abnormal Imaging   [x]Dysphagia    []Persistent Pneumonia  []Anemia   []Food Impaction  []History of Polyps  []GI Bleed   []Pulmonary nodule/Mass  []Change in bowel habits  [x]Heartburn/Reflux  []Rectal Bleed (BRBPR)  []Chest Pain - Non Cardiac  []Heme (+) Stool  []Ulcers  []Constipation   []Hemoptysis   []Varices  []Diarrhea   []Hypoxemia  []Nausea/Vomiting   []Screening   []Crohns/Colitis  []Other:    Anesthesia:   [x] MAC [] Moderate Sedation   [] General   [] None     ROS: 12 pt Review of Symptoms was negative unless mentioned above    Medications:   Prior to Admission medications    Medication Sig Start Date End Date Taking? Authorizing Provider   lisdexamfetamine (VYVANSE) 50 MG capsule Take 1 capsule by mouth every morning. Max Daily Amount: 50 mg   Yes Provider, MD Sherrell   insulin lispro, 1 Unit Dial, (HUMALOG KWIKPEN) 100 UNIT/ML SOPN Dosing per insulin pump. Max dosing 37 units per day. 24  Yes Saad Marques DO   pantoprazole (PROTONIX) 40 MG tablet Take 1 tablet by mouth daily 10/17/24  Yes Herman Bernal MD   methylPREDNISolone (MEDROL DOSEPACK) 4 MG tablet Take by mouth. 10/16/24  Yes Saad Marques DO   levothyroxine (SYNTHROID) 50 MCG tablet TAKE 1 TABLET BY MOUTH EVERY DAY 10/9/24  Yes Saad Marques DO   desvenlafaxine succinate (PRISTIQ) 25 MG TB24 extended release tablet 3 tablets daily 24  Yes Provider,

## 2024-12-06 NOTE — ANESTHESIA POSTPROCEDURE EVALUATION
Department of Anesthesiology  Postprocedure Note    Patient: Brandan Portillo  MRN: [id number]  YOB: 1965  Date of evaluation: 12/6/2024    Procedure Summary       Date: 12/06/24 Room / Location: [facility] OR 02 / [facility]    Anesthesia Start: 1223 Anesthesia Stop: 1234    Procedure: ESOPHAGOGASTRODUODENOSCOPY Diagnosis:       Esophageal dysphagia      History of reconstructive repair of rectocele      GERD (gastroesophageal reflux disease)      (Esophageal dysphagia [R13.19])      (History of reconstructive repair of rectocele [Z98.890])      (GERD (gastroesophageal reflux disease) [K21.9])    Surgeons: Herman Bernal MD Responsible Provider: Sangeetha Goins APRN - CRNA    Anesthesia Type: MAC ASA Status: 2            Anesthesia Type: No value filed.    Bala Phase I: Bala Score: 10    Bala Phase II:      Anesthesia Post Evaluation    Patient location during evaluation: bedside  Patient participation: complete - patient participated  Level of consciousness: awake and alert  Pain score: 0  Airway patency: patent  Nausea & Vomiting: no nausea and no vomiting  Cardiovascular status: blood pressure returned to baseline and hemodynamically stable  Respiratory status: acceptable, spontaneous ventilation, nonlabored ventilation and nasal cannula  Hydration status: euvolemic  Pain management: adequate        No notable events documented.

## 2024-12-06 NOTE — ANESTHESIA PRE PROCEDURE
Department of Anesthesiology  Preprocedure Note       Name:  Brandan Portillo   Age:  59 y.o.  :  1965                                          MRN:  [id number]         Date:  2024      Surgeon: Surgeon(s):  Herman Bernal MD    Procedure: Procedure(s):  ESOPHAGOGASTRODUODENOSCOPY    Medications prior to admission:   Prior to Admission medications    Medication Sig Start Date End Date Taking? Authorizing Provider   insulin lispro, 1 Unit Dial, (HUMALOG KWIKPEN) 100 UNIT/ML SOPN Dosing per insulin pump. Max dosing 37 units per day. 24   Saad Marques DO   pantoprazole (PROTONIX) 40 MG tablet Take 1 tablet by mouth daily 10/17/24   Herman Bernal MD   methylPREDNISolone (MEDROL DOSEPACK) 4 MG tablet Take by mouth. 10/16/24   Saad Marques DO   levothyroxine (SYNTHROID) 50 MCG tablet TAKE 1 TABLET BY MOUTH EVERY DAY 10/9/24   Saad Marques DO   desvenlafaxine succinate (PRISTIQ) 25 MG TB24 extended release tablet  24   Provider, MD Sherrell   valsartan (DIOVAN) 80 MG tablet Take 1 tablet by mouth daily 24   Saad Marques DO   progesterone (PROMETRIUM) 200 MG CAPS capsule TAKE 1 CAPSULE BY MOUTH EVERY NIGHT 24   Saad Marques DO   ibuprofen (ADVIL;MOTRIN) 800 MG tablet  24   ProviderSherrell MD   DUPIXENT 300 MG/2ML SOPN injection  24   ProviderSherrell MD   doxepin (SINEQUAN) 10 MG capsule TAKE 1 CAPSULE BY MOUTH AT NIGHT 24   ProviderSherrell MD   mometasone-formoterol (DULERA) 200-5 MCG/ACT inhaler Inhale 2 puffs into the lungs in the morning and 2 puffs in the evening. 24   Saad Marques DO   ketoconazole (NIZORAL) 2 % shampoo USE AS SHAMPOO ONCE DAILY AS NEEDED 10/31/23   Saad Marques DO   albuterol sulfate HFA (PROVENTIL HFA) 108 (90 Base) MCG/ACT inhaler Inhale 2 puffs into the lungs every 4 hours as needed for Wheezing 10/11/23   Saad Marques, DO   alirocumab (PRALUENT) 75 MG/ML SOAJ injection pen INJECT

## 2024-12-09 DIAGNOSIS — J44.9 CHRONIC OBSTRUCTIVE PULMONARY DISEASE, UNSPECIFIED COPD TYPE (HCC): ICD-10-CM

## 2024-12-10 RX ORDER — FLUTICASONE FUROATE AND VILANTEROL 200; 25 UG/1; UG/1
1 POWDER RESPIRATORY (INHALATION)
Qty: 28 EACH | Refills: 0 | Status: SHIPPED | OUTPATIENT
Start: 2024-12-10

## 2024-12-10 NOTE — TELEPHONE ENCOUNTER
Pharmacy requesting medication refill. Please approve or deny this request.    Rx requested:  Requested Prescriptions     Pending Prescriptions Disp Refills    fluticasone furoate-vilanterol (BREO ELLIPTA) 200-25 MCG/ACT AEPB inhaler [Pharmacy Med Name: BREO ELLIPTA 200-25 MCG INHALR] 28 each 0     Si puff by Nasal route daily         Last Office Visit:   2024      Next Visit Date:  Future Appointments   Date Time Provider Department Center   2025  2:00 PM Slavik-Bosworth, Kelly J, APRN - CNP [facility]   2025  3:30 PM Saad Marques DO [facility] DEP

## 2024-12-11 ENCOUNTER — TELEPHONE (OUTPATIENT)
Dept: FAMILY MEDICINE CLINIC | Age: 59
End: 2024-12-11

## 2024-12-11 RX ORDER — METHYLPREDNISOLONE 4 MG/1
TABLET ORAL
Qty: 1 KIT | Refills: 0 | Status: SHIPPED | OUTPATIENT
Start: 2024-12-11

## 2024-12-11 NOTE — TELEPHONE ENCOUNTER
----- Message from Johann YING sent at 12/11/2024  4:02 PM EST -----  Regarding: ECC Escalation To Practice  ECC Escalation To Practice      Type of Escalation: Red Flag Symptom  --------------------------------------------------------------------------------------------------------------------------    Information for Provider:  Patient is looking for appointment for: Symptom : cough / blood pressure getting and sugar getting high /  chest pain    Reasons for Message: Unable to reach practice     Additional Information : Patient request to have an appointment as soon as possible.  --------------------------------------------------------------------------------------------------------------------------    Relationship to Patient: Self     Call Back Info: OK to leave message on voicemail  Preferred Call Back Number: Phone [phone] (home)

## 2025-01-10 DIAGNOSIS — J44.9 CHRONIC OBSTRUCTIVE PULMONARY DISEASE, UNSPECIFIED COPD TYPE (HCC): ICD-10-CM

## 2025-01-10 RX ORDER — FLUTICASONE FUROATE AND VILANTEROL TRIFENATATE 200; 25 UG/1; UG/1
1 POWDER RESPIRATORY (INHALATION) DAILY
Qty: 60 EACH | Refills: 0 | Status: SHIPPED | OUTPATIENT
Start: 2025-01-10

## 2025-01-10 NOTE — TELEPHONE ENCOUNTER
Pharmacy requesting medication refill. Please approve or deny this request.    Rx requested:  Requested Prescriptions     Pending Prescriptions Disp Refills    fluticasone furoate-vilanterol (BREO ELLIPTA) 200-25 MCG/ACT AEPB inhaler [Pharmacy Med Name: BREO ELLIPTA 200-25 MCG INHALR] 60 each 0     Sig: INHALE 1 PUFF DAILY         Last Office Visit:   8/20/2024      Next Visit Date:  Future Appointments   Date Time Provider Department Center   2/18/2025  3:30 PM Saad Marques DO [facility] DEP

## 2025-01-14 DIAGNOSIS — E78.5 HYPERLIPIDEMIA, UNSPECIFIED: ICD-10-CM

## 2025-01-15 RX ORDER — ALIROCUMAB 75 MG/ML
INJECTION, SOLUTION SUBCUTANEOUS
Qty: 6 PEN | Refills: 3 | Status: SHIPPED | OUTPATIENT
Start: 2025-01-15 | End: 2026-01-15

## 2025-01-31 DIAGNOSIS — I10 PRIMARY HYPERTENSION: ICD-10-CM

## 2025-01-31 RX ORDER — VALSARTAN 80 MG/1
80 TABLET ORAL DAILY
Qty: 90 TABLET | Refills: 1 | Status: SHIPPED | OUTPATIENT
Start: 2025-01-31

## 2025-03-13 ENCOUNTER — OFFICE VISIT (OUTPATIENT)
Age: 60
End: 2025-03-13

## 2025-03-13 VITALS
TEMPERATURE: 97 F | SYSTOLIC BLOOD PRESSURE: 126 MMHG | WEIGHT: 126.4 LBS | HEART RATE: 82 BPM | HEIGHT: 64 IN | DIASTOLIC BLOOD PRESSURE: 76 MMHG | OXYGEN SATURATION: 99 % | BODY MASS INDEX: 21.58 KG/M2

## 2025-03-13 DIAGNOSIS — E10.22 TYPE 1 DIABETES MELLITUS WITH DIABETIC CHRONIC KIDNEY DISEASE, UNSPECIFIED CKD STAGE (HCC): Primary | ICD-10-CM

## 2025-03-13 DIAGNOSIS — D35.2 PITUITARY ADENOMA (HCC): ICD-10-CM

## 2025-03-13 DIAGNOSIS — E78.2 MIXED HYPERLIPIDEMIA DUE TO TYPE 2 DIABETES MELLITUS (HCC): ICD-10-CM

## 2025-03-13 DIAGNOSIS — N31.9 NEUROGENIC BLADDER: ICD-10-CM

## 2025-03-13 DIAGNOSIS — R30.0 DYSURIA: ICD-10-CM

## 2025-03-13 DIAGNOSIS — I73.9 PERIPHERAL ARTERIAL DISEASE: ICD-10-CM

## 2025-03-13 DIAGNOSIS — E03.8 OTHER SPECIFIED HYPOTHYROIDISM: ICD-10-CM

## 2025-03-13 DIAGNOSIS — F32.1 MODERATE MAJOR DEPRESSION (HCC): ICD-10-CM

## 2025-03-13 DIAGNOSIS — I10 PRIMARY HYPERTENSION: ICD-10-CM

## 2025-03-13 DIAGNOSIS — E34.9 HORMONE IMBALANCE: ICD-10-CM

## 2025-03-13 DIAGNOSIS — E11.69 MIXED HYPERLIPIDEMIA DUE TO TYPE 2 DIABETES MELLITUS (HCC): ICD-10-CM

## 2025-03-13 DIAGNOSIS — R33.9 URINARY RETENTION: ICD-10-CM

## 2025-03-13 DIAGNOSIS — J44.9 CHRONIC OBSTRUCTIVE PULMONARY DISEASE, UNSPECIFIED COPD TYPE (HCC): ICD-10-CM

## 2025-03-13 LAB
BILIRUBIN, POC: NORMAL
BLOOD URINE, POC: NORMAL
CLARITY, POC: NORMAL
COLOR, POC: YELLOW
GLUCOSE URINE, POC: NORMAL MG/DL
HBA1C MFR BLD: 7 %
KETONES, POC: NORMAL MG/DL
LEUKOCYTE EST, POC: NORMAL
NITRITE, POC: NORMAL
PH, POC: 7
PROTEIN, POC: NORMAL MG/DL
SPECIFIC GRAVITY, POC: 1.01
UROBILINOGEN, POC: 0.2 MG/DL

## 2025-03-13 RX ORDER — ACETAMINOPHEN 160 MG
2000 TABLET,DISINTEGRATING ORAL DAILY
Qty: 90 CAPSULE | Refills: 3 | Status: SHIPPED | OUTPATIENT
Start: 2025-03-13

## 2025-03-13 RX ORDER — FLUTICASONE FUROATE AND VILANTEROL 200; 25 UG/1; UG/1
1 POWDER RESPIRATORY (INHALATION) DAILY
Qty: 60 EACH | Refills: 0 | Status: SHIPPED | OUTPATIENT
Start: 2025-03-13

## 2025-03-13 RX ORDER — NYSTATIN 100000 [USP'U]/ML
SUSPENSION ORAL
COMMUNITY
Start: 2024-06-24

## 2025-03-13 RX ORDER — VALACYCLOVIR HYDROCHLORIDE 500 MG/1
500 TABLET, FILM COATED ORAL DAILY
COMMUNITY
Start: 2025-01-20

## 2025-03-13 RX ORDER — LEVOTHYROXINE SODIUM 50 UG/1
50 TABLET ORAL DAILY
Qty: 90 TABLET | Refills: 3 | Status: SHIPPED | OUTPATIENT
Start: 2025-03-13

## 2025-03-13 RX ORDER — OFLOXACIN 3 MG/ML
SOLUTION/ DROPS OPHTHALMIC
COMMUNITY
Start: 2025-01-31

## 2025-03-13 RX ORDER — ALIROCUMAB 75 MG/ML
75 INJECTION, SOLUTION SUBCUTANEOUS
Qty: 2.24 ML | Refills: 5 | Status: SHIPPED | OUTPATIENT
Start: 2025-03-13

## 2025-03-13 RX ORDER — IBUPROFEN 800 MG/1
800 TABLET, FILM COATED ORAL EVERY 8 HOURS PRN
Qty: 120 TABLET | Refills: 1 | Status: SHIPPED | OUTPATIENT
Start: 2025-03-13

## 2025-03-13 RX ORDER — INSULIN LISPRO 100 [IU]/ML
INJECTION, SOLUTION INTRAVENOUS; SUBCUTANEOUS
Qty: 10 ML | Refills: 5 | Status: SHIPPED | OUTPATIENT
Start: 2025-03-13

## 2025-03-13 RX ORDER — METOPROLOL TARTRATE 25 MG/1
12.5 TABLET, FILM COATED ORAL 2 TIMES DAILY
Qty: 180 TABLET | Refills: 1 | Status: SHIPPED | OUTPATIENT
Start: 2025-03-13

## 2025-03-13 RX ORDER — ASPIRIN 81 MG/1
81 TABLET, CHEWABLE ORAL DAILY
Qty: 30 TABLET | Refills: 3 | Status: SHIPPED | OUTPATIENT
Start: 2025-03-13

## 2025-03-13 RX ORDER — PROGESTERONE 200 MG/1
200 CAPSULE ORAL NIGHTLY
Qty: 90 CAPSULE | Refills: 0 | Status: SHIPPED | OUTPATIENT
Start: 2025-03-13

## 2025-03-13 RX ORDER — PREDNISOLONE ACETATE 10 MG/ML
SUSPENSION/ DROPS OPHTHALMIC
COMMUNITY
Start: 2025-01-31

## 2025-03-13 RX ORDER — AMLODIPINE AND VALSARTAN 5; 160 MG/1; MG/1
1 TABLET ORAL DAILY
COMMUNITY
Start: 2024-11-06 | End: 2025-11-06

## 2025-03-13 RX ORDER — TRIAMCINOLONE ACETONIDE 1 MG/G
CREAM TOPICAL
COMMUNITY
Start: 2024-06-24

## 2025-03-13 SDOH — ECONOMIC STABILITY: FOOD INSECURITY: WITHIN THE PAST 12 MONTHS, YOU WORRIED THAT YOUR FOOD WOULD RUN OUT BEFORE YOU GOT MONEY TO BUY MORE.: NEVER TRUE

## 2025-03-13 SDOH — ECONOMIC STABILITY: FOOD INSECURITY: WITHIN THE PAST 12 MONTHS, THE FOOD YOU BOUGHT JUST DIDN'T LAST AND YOU DIDN'T HAVE MONEY TO GET MORE.: NEVER TRUE

## 2025-03-13 ASSESSMENT — PATIENT HEALTH QUESTIONNAIRE - PHQ9
SUM OF ALL RESPONSES TO PHQ QUESTIONS 1-9: 0
SUM OF ALL RESPONSES TO PHQ QUESTIONS 1-9: 0
3. TROUBLE FALLING OR STAYING ASLEEP: NOT AT ALL
4. FEELING TIRED OR HAVING LITTLE ENERGY: NOT AT ALL
1. LITTLE INTEREST OR PLEASURE IN DOING THINGS: NOT AT ALL
SUM OF ALL RESPONSES TO PHQ QUESTIONS 1-9: 0
2. FEELING DOWN, DEPRESSED OR HOPELESS: NOT AT ALL
10. IF YOU CHECKED OFF ANY PROBLEMS, HOW DIFFICULT HAVE THESE PROBLEMS MADE IT FOR YOU TO DO YOUR WORK, TAKE CARE OF THINGS AT HOME, OR GET ALONG WITH OTHER PEOPLE: NOT DIFFICULT AT ALL
9. THOUGHTS THAT YOU WOULD BE BETTER OFF DEAD, OR OF HURTING YOURSELF: NOT AT ALL
8. MOVING OR SPEAKING SO SLOWLY THAT OTHER PEOPLE COULD HAVE NOTICED. OR THE OPPOSITE, BEING SO FIGETY OR RESTLESS THAT YOU HAVE BEEN MOVING AROUND A LOT MORE THAN USUAL: NOT AT ALL
SUM OF ALL RESPONSES TO PHQ QUESTIONS 1-9: 0
5. POOR APPETITE OR OVEREATING: NOT AT ALL
7. TROUBLE CONCENTRATING ON THINGS, SUCH AS READING THE NEWSPAPER OR WATCHING TELEVISION: NOT AT ALL
6. FEELING BAD ABOUT YOURSELF - OR THAT YOU ARE A FAILURE OR HAVE LET YOURSELF OR YOUR FAMILY DOWN: NOT AT ALL

## 2025-03-13 NOTE — PROGRESS NOTES
Cristel Sweet.    She has been experiencing urinary symptoms, which she attributes to a significant prolapse following mesh removal. She describes the need to manually express urine during urination and suspects the presence of a urinary tract infection. She has been using disposable self-catheterization post-surgery due to urinary retention and has sought emergency care on several occasions for the same issue. She requests a prescription for self-catheterization supplies, as she often wakes up four times per night due to urinary retention. A gastroenterologist had previously ordered a barium enema to investigate the prolapse, but she declined the procedure and is currently not considering surgical repair.    She underwent cataract surgery on one eye, with the other eye scheduled for the following week. Postoperatively, she reported visual distortion, describing it as looking through a reversed contact lens. This issue persisted for three months, during which she experienced displaced lights in her field of vision. She was referred to a retina surgeon in [place], who performed a vitrectomy and laser treatment for diabetic retinopathy. Despite these interventions, she continues to experience visual disturbances, although with some improvement. She expresses apprehension about undergoing cataract surgery on her other eye. She currently uses over-the-counter reading glasses for driving, noting that her eyes are not functioning in unison.    MEDICATIONS  Current: Praluent, Plavix, Vyvanse    Past Medical History:   Diagnosis Date    CAD (coronary artery disease)     COPD (chronic obstructive pulmonary disease) (HCC)     Former smoker     Hyperlipidemia     Hypertension     Thyroid disease     Type 1 diabetes mellitus (HCC)     Type 1 diabetes mellitus with diabetic chronic kidney disease (HCC)      Past Surgical History:   Procedure Laterality Date    APPENDECTOMY      CARDIAC CATHETERIZATION      X2   2010 AND 2018?

## 2025-04-09 DIAGNOSIS — R13.19 ESOPHAGEAL DYSPHAGIA: ICD-10-CM

## 2025-04-09 DIAGNOSIS — K21.9 GASTROESOPHAGEAL REFLUX DISEASE, UNSPECIFIED WHETHER ESOPHAGITIS PRESENT: ICD-10-CM

## 2025-04-10 DIAGNOSIS — J44.9 CHRONIC OBSTRUCTIVE PULMONARY DISEASE, UNSPECIFIED COPD TYPE (HCC): ICD-10-CM

## 2025-04-10 RX ORDER — FLUTICASONE FUROATE AND VILANTEROL TRIFENATATE 200; 25 UG/1; UG/1
POWDER RESPIRATORY (INHALATION)
Qty: 60 EACH | Refills: 5 | Status: SHIPPED | OUTPATIENT
Start: 2025-04-10

## 2025-04-10 RX ORDER — FAMOTIDINE 20 MG/1
20 TABLET, FILM COATED ORAL 2 TIMES DAILY
Qty: 180 TABLET | Refills: 1 | Status: SHIPPED | OUTPATIENT
Start: 2025-04-10

## 2025-04-10 RX ORDER — PANTOPRAZOLE SODIUM 40 MG/1
40 TABLET, DELAYED RELEASE ORAL DAILY
Qty: 90 TABLET | Refills: 1 | Status: SHIPPED | OUTPATIENT
Start: 2025-04-10

## 2025-05-02 ENCOUNTER — TELEMEDICINE (OUTPATIENT)
Age: 60
End: 2025-05-02
Payer: MEDICARE

## 2025-05-02 DIAGNOSIS — Z00.00 MEDICARE ANNUAL WELLNESS VISIT, SUBSEQUENT: Primary | ICD-10-CM

## 2025-05-02 PROCEDURE — G0439 PPPS, SUBSEQ VISIT: HCPCS | Performed by: NURSE PRACTITIONER

## 2025-05-02 ASSESSMENT — PATIENT HEALTH QUESTIONNAIRE - PHQ9
9. THOUGHTS THAT YOU WOULD BE BETTER OFF DEAD, OR OF HURTING YOURSELF: NOT AT ALL
SUM OF ALL RESPONSES TO PHQ QUESTIONS 1-9: 0
3. TROUBLE FALLING OR STAYING ASLEEP: NOT AT ALL
5. POOR APPETITE OR OVEREATING: NOT AT ALL
6. FEELING BAD ABOUT YOURSELF - OR THAT YOU ARE A FAILURE OR HAVE LET YOURSELF OR YOUR FAMILY DOWN: NOT AT ALL
SUM OF ALL RESPONSES TO PHQ QUESTIONS 1-9: 0
SUM OF ALL RESPONSES TO PHQ QUESTIONS 1-9: 0
7. TROUBLE CONCENTRATING ON THINGS, SUCH AS READING THE NEWSPAPER OR WATCHING TELEVISION: NOT AT ALL
8. MOVING OR SPEAKING SO SLOWLY THAT OTHER PEOPLE COULD HAVE NOTICED. OR THE OPPOSITE, BEING SO FIGETY OR RESTLESS THAT YOU HAVE BEEN MOVING AROUND A LOT MORE THAN USUAL: NOT AT ALL
4. FEELING TIRED OR HAVING LITTLE ENERGY: NOT AT ALL
SUM OF ALL RESPONSES TO PHQ QUESTIONS 1-9: 0
2. FEELING DOWN, DEPRESSED OR HOPELESS: NOT AT ALL
1. LITTLE INTEREST OR PLEASURE IN DOING THINGS: NOT AT ALL
10. IF YOU CHECKED OFF ANY PROBLEMS, HOW DIFFICULT HAVE THESE PROBLEMS MADE IT FOR YOU TO DO YOUR WORK, TAKE CARE OF THINGS AT HOME, OR GET ALONG WITH OTHER PEOPLE: NOT DIFFICULT AT ALL

## 2025-05-02 ASSESSMENT — LIFESTYLE VARIABLES
HOW OFTEN DO YOU HAVE A DRINK CONTAINING ALCOHOL: 2-4 TIMES A MONTH
HOW MANY STANDARD DRINKS CONTAINING ALCOHOL DO YOU HAVE ON A TYPICAL DAY: 1 OR 2

## 2025-05-02 NOTE — PROGRESS NOTES
Medicare Annual Wellness Visit    Brandan Portillo is here for Medicare AWV    Assessment & Plan   Medicare annual wellness visit, subsequent       Return in 1 year (on 5/2/2026) for Medicare AWV.     Subjective       Patient's complete Health Risk Assessment and screening values have been reviewed and are found in Flowsheets. The following problems were reviewed today and where indicated follow up appointments were made and/or referrals ordered.    Positive Risk Factor Screenings with Interventions:              Inactivity:  On average, how many days per week do you engage in moderate to strenuous exercise (like a brisk walk)?: 0 days (!) Abnormal  On average, how many minutes do you engage in exercise at this level?: 0 min  Interventions:  Patient declined any further interventions or treatment           Advanced Directives:  Do you have a Living Will?: (!) No    Intervention:  has NO advanced directive - not interested in additional information                     Objective    Patient-Reported Vitals  No data recorded               Allergies   Allergen Reactions    Latex Rash    Demerol Hcl [Meperidine] Nausea And Vomiting    Benazepril Other (See Comments)    Statins     Vortioxetine Other (See Comments)     Prior to Visit Medications    Medication Sig Taking? Authorizing Provider   pantoprazole (PROTONIX) 40 MG tablet TAKE 1 TABLET BY MOUTH EVERY DAY Yes Slavik-Bosworth, Kelly J, APRN - CNP   fluticasone furoate-vilanterol (BREO ELLIPTA) 200-25 MCG/ACT AEPB inhaler INHALE 1 PUFF BY MOUTH ONCE A DAY Yes Saad Marques,    famotidine (PEPCID) 20 MG tablet TAKE 1 TABLET BY MOUTH TWICE A DAY Yes Slavik-Bosworth, Kelly J, APRN - CNP   amLODIPine-valsartan (EXFORGE) 5-160 MG per tablet Take 1 tablet by mouth daily Yes Sherrell Caldwell MD   nystatin (MYCOSTATIN) 965118 UNIT/ML suspension Nystatin Active 5 ML PO Four times daily 200 10 June 24th, 2024 12:00am swish and swallow Yes Sherrell Caldwell MD

## 2025-05-29 RX ORDER — IBUPROFEN 800 MG/1
800 TABLET, FILM COATED ORAL EVERY 8 HOURS PRN
Qty: 120 TABLET | Refills: 1 | Status: SHIPPED | OUTPATIENT
Start: 2025-05-29

## 2025-06-04 ENCOUNTER — TELEPHONE (OUTPATIENT)
Dept: PHARMACY | Facility: CLINIC | Age: 60
End: 2025-06-04

## 2025-06-04 NOTE — TELEPHONE ENCOUNTER
SHARA MICHAUD DO - Statin Therapy Care Gap    Patient has a visit with you on 6/4/25.  Chart reviewed due to insurer-identified care gap.  Brandan Portillo is receiving treatment for diabetes and is not currently prescribed statin therapy.      Per chart review:  Statin intolerance is documented on the EPIC allergy list (noted 8/30/23) - reaction is not documented.  Per 4/11/24 cardiology visit note:  Hyperlipidemia. Intolerant to statin. Under excellent control on Praluent.       Please consider adding the following CMS allowable diagnosis as a visit diagnosis within your 6/4/25 encounter for statin exclusion in 2025 (if appropriate):  statin myopathy (ICD-10 G72.0, T46.6X5A)      Please also note that patient is overdue to refill valsartan - last refilled on 1/31/25 for 90 days - is patient to continue to take valsartan?      Thank you,  Mona Nicole, PharmD, Formerly named [facility] Pharmacist  [facility] Clinical Pharmacy  Department, toll free: [phone], option 1   ===============================================================================    [facility] CLINICAL PHARMACY: STATIN THERAPY REVIEW  Identified statin use in persons with diabetes care gap per Yasmine. Records dated: 5/26/25.    Last Office Visit: 3/13/25 with SHARA MICHAUD DO    Patient also appears to be prescribed the following medications in an adherence measure: valsartan    Patient is prescribed the following for lipid management: Praluent    Allergies   Allergen Reactions    Latex Rash    Demerol Hcl [Meperidine] Nausea And Vomiting    Benazepril Other (See Comments)    Statins     Vortioxetine Other (See Comments)       ASSESSMENT    DIABETES ADHERENCE  Insurance Records claims through 5/26/25  YTD PDC = EXCLUDED (on insulin):     Lab Results   Component Value Date    LABA1C 7.0 03/13/2025    LABA1C 6.4 08/20/2024    LABA1C 7.6 02/20/2024     NOTE: A1c <9%        ACE/ARB ADHERENCE  Insurance Records claims through

## 2025-06-05 PROBLEM — G72.0 STATIN MYOPATHY: Status: ACTIVE | Noted: 2025-06-05

## 2025-06-05 PROBLEM — T46.6X5A STATIN MYOPATHY: Status: ACTIVE | Noted: 2025-06-05

## 2025-06-05 RX ORDER — INSULIN LISPRO 100 [IU]/ML
INJECTION, SOLUTION INTRAVENOUS; SUBCUTANEOUS
Qty: 10 ML | Refills: 5 | Status: SHIPPED | OUTPATIENT
Start: 2025-06-05

## 2025-06-09 DIAGNOSIS — E34.9 HORMONE IMBALANCE: ICD-10-CM

## 2025-06-09 RX ORDER — PROGESTERONE 200 MG/1
CAPSULE ORAL
Qty: 90 CAPSULE | Refills: 0 | Status: SHIPPED | OUTPATIENT
Start: 2025-06-09

## 2025-06-09 NOTE — TELEPHONE ENCOUNTER
Patient canceled the visit scheduled for 6/4/25.     Will continue to follow.    Mona Nicole, PharmD, [facility]  Population Health Pharmacist  [facility] Clinical Pharmacy  Department, toll free: [phone], option 1      For Pharmacy Admin Tracking Only  Program: [employer]  CPA in place:  No  Gap Closed?: No   Time Spent (min): 30

## 2025-06-18 ENCOUNTER — HOSPITAL ENCOUNTER (EMERGENCY)
Age: 60
Discharge: HOME | End: 2025-06-18
Payer: MEDICARE

## 2025-06-18 VITALS — HEART RATE: 92 BPM | OXYGEN SATURATION: 99 %

## 2025-06-18 VITALS — HEART RATE: 93 BPM | OXYGEN SATURATION: 100 %

## 2025-06-18 VITALS — SYSTOLIC BLOOD PRESSURE: 154 MMHG | OXYGEN SATURATION: 100 % | DIASTOLIC BLOOD PRESSURE: 90 MMHG | HEART RATE: 87 BPM

## 2025-06-18 VITALS — OXYGEN SATURATION: 99 % | HEART RATE: 86 BPM

## 2025-06-18 VITALS — HEART RATE: 87 BPM | OXYGEN SATURATION: 99 %

## 2025-06-18 VITALS — BODY MASS INDEX: 21.1 KG/M2

## 2025-06-18 VITALS — SYSTOLIC BLOOD PRESSURE: 169 MMHG | OXYGEN SATURATION: 100 % | HEART RATE: 87 BPM | DIASTOLIC BLOOD PRESSURE: 102 MMHG

## 2025-06-18 VITALS — DIASTOLIC BLOOD PRESSURE: 105 MMHG | TEMPERATURE: 98 F | SYSTOLIC BLOOD PRESSURE: 180 MMHG | HEART RATE: 94 BPM

## 2025-06-18 VITALS — OXYGEN SATURATION: 100 %

## 2025-06-18 DIAGNOSIS — R11.2: ICD-10-CM

## 2025-06-18 DIAGNOSIS — K52.9: Primary | ICD-10-CM

## 2025-06-18 DIAGNOSIS — E10.9: ICD-10-CM

## 2025-06-18 DIAGNOSIS — Z96.41: ICD-10-CM

## 2025-06-18 LAB
ACETONE: NEGATIVE
ADD MANUAL DIFF: NO
ALANINE AMINOTRANSFERASE: 24 U/L (ref 14–59)
ALBUMIN GLOBULIN RATIO: 1.3
ALBUMIN LEVEL: 3.9 G/DL (ref 3.4–5)
ALKALINE PHOSPHATASE: 83 U/L (ref 46–116)
ANION GAP: 15.1
ASPARTATE AMINO TRANSFERASE: 18 U/L (ref 15–37)
BASOPHILS # BLD AUTO: 0 10^3/UL (ref 0–0.1)
BASOPHILS NFR BLD AUTO: 0.2 % (ref 0.2–2)
BILIRUBIN TOTAL: 0.6 MG/DL (ref 0.2–1)
BUN SERPL-MCNC: 13 MG/DL (ref 7–18)
BUN/CREAT SERPL: 16.9
CALCIUM: 8.9 MG/DL (ref 8.5–10.1)
CHLORIDE: 99 MMOL/L (ref 98–107)
CO2 BLD-SCNC: 28.8 MMOL/L (ref 21–32)
CREAT SERPL-SCNC: 0.77 MG/DL (ref 0.55–1.02)
EOSINOPHIL # BLD AUTO: 0 10^3/UL (ref 0–0.7)
EOSINOPHIL NFR BLD AUTO: 0.1 % (ref 0.9–7)
ERYTHROCYTE [DISTWIDTH] IN BLOOD BY AUTOMATED COUNT: 13.7 % (ref 11–15)
ESTIMATED GFR (AFRICAN AMERICA: >60
ESTIMATED GFR (NON-AFRICAN AME: >60
GLOBULIN: 3 G/DL
GLUCOSE BLDC GLUCOMTR-MCNC: 168 MG/DL (ref 74–106)
GLUCOSE SERPLBLD-MCNC: 169 MG/DL (ref 74–106)
HCT VFR BLD CALC: 43.6 % (ref 36–48)
HEMOGLOBIN: 15.5 G/DL (ref 12–16)
IMMATURE GRANULOCYTES ABS AUTO: 0.02 10^3/UL (ref 0–0.03)
IMMATURE GRANULOCYTES PCT AUTO: 0.2 % (ref 0–0.5)
LACTATE/LACTIC ACID: 1.2 MMOL/L (ref 0.4–2)
LIPASE: 19 U/L (ref 16–77)
LYMPHOCYTES  ABSOLUTE AUTO: 1.2 10^3/UL (ref 1.2–3.8)
LYMPHOCYTES PERCENT AUTO: 13.3 % (ref 20.5–60)
MCH RBC QN AUTO: 33.1 PG (ref 26.7–34)
MCV RBC: 93.2 FL (ref 81–99)
MEAN CORPUSCULAR HGB CONC: 35.6 G/DL (ref 29.9–35.2)
MEAN PLATELET VOLUME: 9.5 FL (ref 9.5–13.5)
MONOCYTES # BLD AUTO: 0.3 10^3/UL (ref 0.3–0.8)
MONOCYTES NFR BLD AUTO: 3.8 % (ref 1.7–12)
NEUTROPHILS # BLD AUTO: 7.2 10^3/UL (ref 1.4–6.5)
NEUTROPHILS NFR BLD AUTO: 82.4 % (ref 43–75)
PLATELET # BLD: 305 10^3/UL (ref 150–450)
POTASSIUM SERPLBLD-SCNC: 3.9 MMOL/L (ref 3.5–5.1)
RBC # BLD AUTO: 4.68 10^6/UL (ref 4.2–5.4)
SODIUM BLD-SCNC: 139 MMOL/L (ref 136–145)
TOTAL PROTEIN: 6.9 G/DL (ref 6.4–8.2)
TROPONIN I HIGH SENSITIVITY: 7.2 PG/ML (ref 4–51.3)
WBC # BLD: 8.8 10^3/UL (ref 4–11)

## 2025-06-18 PROCEDURE — 85025 COMPLETE CBC W/AUTO DIFF WBC: CPT

## 2025-06-18 PROCEDURE — 83605 ASSAY OF LACTIC ACID: CPT

## 2025-06-18 PROCEDURE — 84484 ASSAY OF TROPONIN QUANT: CPT

## 2025-06-18 PROCEDURE — 96374 THER/PROPH/DIAG INJ IV PUSH: CPT

## 2025-06-18 PROCEDURE — 99285 EMERGENCY DEPT VISIT HI MDM: CPT

## 2025-06-18 PROCEDURE — 80053 COMPREHEN METABOLIC PANEL: CPT

## 2025-06-18 PROCEDURE — 96361 HYDRATE IV INFUSION ADD-ON: CPT

## 2025-06-18 PROCEDURE — 96375 TX/PRO/DX INJ NEW DRUG ADDON: CPT

## 2025-06-18 PROCEDURE — 36415 COLL VENOUS BLD VENIPUNCTURE: CPT

## 2025-06-18 PROCEDURE — 82009 KETONE BODYS QUAL: CPT

## 2025-06-18 PROCEDURE — 74177 CT ABD & PELVIS W/CONTRAST: CPT

## 2025-06-18 PROCEDURE — 83690 ASSAY OF LIPASE: CPT

## 2025-06-18 PROCEDURE — 81001 URINALYSIS AUTO W/SCOPE: CPT

## 2025-06-20 ENCOUNTER — APPOINTMENT (OUTPATIENT)
Dept: CARDIOLOGY | Facility: CLINIC | Age: 60
End: 2025-06-20
Payer: MEDICARE

## 2025-06-20 VITALS
HEART RATE: 76 BPM | WEIGHT: 128 LBS | BODY MASS INDEX: 21.85 KG/M2 | SYSTOLIC BLOOD PRESSURE: 132 MMHG | HEIGHT: 64 IN | DIASTOLIC BLOOD PRESSURE: 88 MMHG

## 2025-06-20 DIAGNOSIS — E78.2 MIXED HYPERLIPIDEMIA: ICD-10-CM

## 2025-06-20 DIAGNOSIS — Z79.4 TYPE 2 DIABETES MELLITUS WITH OTHER CIRCULATORY COMPLICATION, WITH LONG-TERM CURRENT USE OF INSULIN: ICD-10-CM

## 2025-06-20 DIAGNOSIS — Z78.9 STATIN INTOLERANCE: ICD-10-CM

## 2025-06-20 DIAGNOSIS — E11.59 TYPE 2 DIABETES MELLITUS WITH OTHER CIRCULATORY COMPLICATION, WITH LONG-TERM CURRENT USE OF INSULIN: ICD-10-CM

## 2025-06-20 DIAGNOSIS — I73.9 PVD (PERIPHERAL VASCULAR DISEASE): ICD-10-CM

## 2025-06-20 DIAGNOSIS — I10 ESSENTIAL (PRIMARY) HYPERTENSION: ICD-10-CM

## 2025-06-20 DIAGNOSIS — Z87.891 FORMER SMOKER: ICD-10-CM

## 2025-06-20 PROCEDURE — 3008F BODY MASS INDEX DOCD: CPT | Performed by: INTERNAL MEDICINE

## 2025-06-20 PROCEDURE — 3079F DIAST BP 80-89 MM HG: CPT | Performed by: INTERNAL MEDICINE

## 2025-06-20 PROCEDURE — 99214 OFFICE O/P EST MOD 30 MIN: CPT | Performed by: INTERNAL MEDICINE

## 2025-06-20 PROCEDURE — 1036F TOBACCO NON-USER: CPT | Performed by: INTERNAL MEDICINE

## 2025-06-20 PROCEDURE — 3075F SYST BP GE 130 - 139MM HG: CPT | Performed by: INTERNAL MEDICINE

## 2025-06-20 RX ORDER — METOPROLOL TARTRATE 25 MG/1
25 TABLET, FILM COATED ORAL DAILY
Qty: 90 TABLET | Refills: 3 | Status: SHIPPED | OUTPATIENT
Start: 2025-06-20

## 2025-06-20 RX ORDER — AMLODIPINE AND VALSARTAN 5; 160 MG/1; MG/1
1 TABLET ORAL DAILY
Qty: 90 TABLET | Refills: 3 | Status: SHIPPED | OUTPATIENT
Start: 2025-06-20 | End: 2026-06-20

## 2025-06-20 RX ORDER — ALIROCUMAB 75 MG/ML
75 INJECTION, SOLUTION SUBCUTANEOUS
Qty: 6 PEN | Refills: 3 | Status: SHIPPED | OUTPATIENT
Start: 2025-06-20 | End: 2026-06-20

## 2025-06-20 RX ORDER — ALPRAZOLAM 0.25 MG/1
0.25 TABLET ORAL NIGHTLY PRN
COMMUNITY

## 2025-06-20 RX ORDER — INSULIN LISPRO 100 [IU]/ML
INJECTION, SOLUTION INTRAVENOUS; SUBCUTANEOUS
COMMUNITY
Start: 2025-06-05

## 2025-06-20 NOTE — LETTER
June 20, 2025     John Henriquez DO  [address]  [facility] [address]    Patient: Venecia Campos   YOB: 1965   Date of Visit: 6/20/2025       Dear Dr. John Henriquez DO:    Thank you for referring Venecia Campos to me for evaluation. Below are my notes for this consultation.  If you have questions, please do not hesitate to call me. I look forward to following your patient along with you.       Sincerely,     Annette Zabala MD      CC: No Recipients  ______________________________________________________________________________________    HPI  Patient is in the office for follow-up for essential hypertension, dyslipidemia who has vascular disease followed by vascular surgery in [place].  She follows with Dr. Henriquez for her diabetes.  The patient has had no cardiac events since her last visit.  She noticed recently some numbness and coldness in the left toes for which she is seeing vascular surgery in [place] and investigations are underway.  Her medical therapy was reviewed and currently she is on dual antiplatelet therapy.  Her lab data that were done recently including creatinine were noted to be normal and this was shared with her.  Her weight is ideal, her blood pressure is normal.  Her physical examination essentially normal.  The patient is highly motivated.  She does not smoke.    ASSESSMENT AND PLAN:      1.  Essential hypertension, currently under control medical therapy which will be left unchanged, renal function is normal.  Patient follows low-salt diet and has achieved ideal body weight and remains physically active.    2. Hyperlipidemia. Intolerant to statin.  Under excellent control on Praluent, she follows low-fat diet and maintain ideal body weight and exercise regularly.  3.  Type II diabetes, managed by , seems to be  under control.  Patient is follows low carbohydrate diet and try to achieve healthy lifestyle.  4. Severe  "PAD mostly involving the left lower extremity status post surgical revascularization 2022 followed by vascular surgery in [facility] in [place]. Aggressive risk factor modification was emphasized.  Discussed with the patient again considering vascular dose Xarelto replacing Plavix as a better way to treat her PAD.  She will discuss that with vascular surgery in [place]  5.  Intolerance to statin  ROS   Numbness in the left foot, other review of system was normal      Vitals:    06/20/25 1344   BP: 132/88   BP Location: Left arm   Patient Position: Sitting   Pulse: 76   Weight: 58.1 kg (128 lb)   Height: 1.626 m (5' 4\")        Objective   Physical Exam  Constitutional:       Appearance: Normal appearance.   HENT:      Nose: Nose normal.   Neck:      Vascular: No carotid bruit.   Cardiovascular:      Rate and Rhythm: Normal rate.      Pulses: Normal pulses.      Heart sounds: Normal heart sounds.   Pulmonary:      Effort: Pulmonary effort is normal.   Abdominal:      General: Bowel sounds are normal.      Palpations: Abdomen is soft.   Musculoskeletal:         General: Normal range of motion.      Cervical back: Normal range of motion.      Right lower leg: No edema.      Left lower leg: No edema.   Skin:     General: Skin is warm and dry.   Neurological:      General: No focal deficit present.      Mental Status: She is alert.   Psychiatric:         Mood and Affect: Mood normal.         Behavior: Behavior normal.         Thought Content: Thought content normal.         Judgment: Judgment normal.         Allergies  Latex and Statins-hmg-coa reductase inhibitors     Current Medications  Current Outpatient Medications   Medication Instructions   • albuterol 90 mcg/actuation inhaler 2 puffs, Every 4 hours PRN   • ALPRAZolam (XANAX) 0.25 mg, Nightly PRN   • amlodipine-valsartan (Exforge) 5-160 mg tablet 1 tablet, oral, Daily   • aspirin 81 mg, Daily   • cetirizine (ZYRTEC) 10 mg, As needed   • clopidogrel (PLAVIX) 75 mg, " Daily   • desvenlafaxine (PRISTIQ) 50 mg, Daily   • desvenlafaxine succinate (PRISTIQ) 25 mg, Daily   • dupilumab (DUPIXENT PEN SUBQ) Every 14 days   • HumaLOG U-100 Insulin 100 unit/mL injection DOSING PER INSULIN PUMP. MAX DOSING 37 UNITS PER DAY.   • ibuprofen 800 mg, As needed   • lisdexamfetamine (VYVANSE) 50 mg, Every morning   • metoprolol tartrate (LOPRESSOR) 25 mg, oral, Daily   • ondansetron ODT (ZOFRAN-ODT) 4 mg, As needed   • Praluent Pen 75 mg, injection, Every 14 days                        Assessment/Plan   1. Essential (primary) hypertension  Follow Up In Cardiology    Follow Up In Cardiology    amlodipine-valsartan (Exforge) 5-160 mg tablet    metoprolol tartrate (Lopressor) 25 mg tablet      2. Mixed hyperlipidemia  alirocumab (Praluent Pen) 75 mg/mL pen injector      3. PVD (peripheral vascular disease)  alirocumab (Praluent Pen) 75 mg/mL pen injector      4. Type 2 diabetes mellitus with other circulatory complication, with long-term current use of insulin  alirocumab (Praluent Pen) 75 mg/mL pen injector      5. Statin intolerance  alirocumab (Praluent Pen) 75 mg/mL pen injector      6. BMI 21.0-21.9, adult        7. Former smoker                 Scribe Attestation  By signing my name below, IJodi LPN, Scribe   attest that this documentation has been prepared under the direction and in the presence of Annette Zabala MD.     Provider Attestation - Scribe documentation    All medical record entries made by the Scribe were at my direction and personally dictated by me. I have reviewed the chart and agree that the record accurately reflects my personal performance of the history, physical exam, discussion and plan.

## 2025-06-20 NOTE — PROGRESS NOTES
HPI  Patient is in the office for follow-up for essential hypertension, dyslipidemia who has vascular disease followed by vascular surgery in [place].  She follows with Dr. Henriquez for her diabetes.  The patient has had no cardiac events since her last visit.  She noticed recently some numbness and coldness in the left toes for which she is seeing vascular surgery in [place] and investigations are underway.  Her medical therapy was reviewed and currently she is on dual antiplatelet therapy.  Her lab data that were done recently including creatinine were noted to be normal and this was shared with her.  Her weight is ideal, her blood pressure is normal.  Her physical examination essentially normal.  The patient is highly motivated.  She does not smoke.    ASSESSMENT AND PLAN:      1.  Essential hypertension, currently under control medical therapy which will be left unchanged, renal function is normal.  Patient follows low-salt diet and has achieved ideal body weight and remains physically active.    2. Hyperlipidemia. Intolerant to statin.  Under excellent control on Praluent, she follows low-fat diet and maintain ideal body weight and exercise regularly.  3.  Type II diabetes, managed by , seems to be  under control.  Patient is follows low carbohydrate diet and try to achieve healthy lifestyle.  4. Severe PAD mostly involving the left lower extremity status post surgical revascularization 2022 followed by vascular surgery in [facility] in [place]. Aggressive risk factor modification was emphasized.  Discussed with the patient again considering vascular dose Xarelto replacing Plavix as a better way to treat her PAD.  She will discuss that with vascular surgery in [place]  5.  Intolerance to statin  ROS   Numbness in the left foot, other review of system was normal      Vitals:    06/20/25 1344   BP: 132/88   BP Location: Left arm   Patient Position: Sitting   Pulse: 76   Weight: 58.1 kg (128 lb)   Height: 1.626 m  "(5' 4\")        Objective   Physical Exam  Constitutional:       Appearance: Normal appearance.   HENT:      Nose: Nose normal.   Neck:      Vascular: No carotid bruit.   Cardiovascular:      Rate and Rhythm: Normal rate.      Pulses: Normal pulses.      Heart sounds: Normal heart sounds.   Pulmonary:      Effort: Pulmonary effort is normal.   Abdominal:      General: Bowel sounds are normal.      Palpations: Abdomen is soft.   Musculoskeletal:         General: Normal range of motion.      Cervical back: Normal range of motion.      Right lower leg: No edema.      Left lower leg: No edema.   Skin:     General: Skin is warm and dry.   Neurological:      General: No focal deficit present.      Mental Status: She is alert.   Psychiatric:         Mood and Affect: Mood normal.         Behavior: Behavior normal.         Thought Content: Thought content normal.         Judgment: Judgment normal.         Allergies  Latex and Statins-hmg-coa reductase inhibitors     Current Medications  Current Outpatient Medications   Medication Instructions    albuterol 90 mcg/actuation inhaler 2 puffs, Every 4 hours PRN    ALPRAZolam (XANAX) 0.25 mg, Nightly PRN    amlodipine-valsartan (Exforge) 5-160 mg tablet 1 tablet, oral, Daily    aspirin 81 mg, Daily    cetirizine (ZYRTEC) 10 mg, As needed    clopidogrel (PLAVIX) 75 mg, Daily    desvenlafaxine (PRISTIQ) 50 mg, Daily    desvenlafaxine succinate (PRISTIQ) 25 mg, Daily    dupilumab (DUPIXENT PEN SUBQ) Every 14 days    HumaLOG U-100 Insulin 100 unit/mL injection DOSING PER INSULIN PUMP. MAX DOSING 37 UNITS PER DAY.    ibuprofen 800 mg, As needed    lisdexamfetamine (VYVANSE) 50 mg, Every morning    metoprolol tartrate (LOPRESSOR) 25 mg, oral, Daily    ondansetron ODT (ZOFRAN-ODT) 4 mg, As needed    Praluent Pen 75 mg, injection, Every 14 days                        Assessment/Plan   1. Essential (primary) hypertension  Follow Up In Cardiology    Follow Up In Cardiology    " amlodipine-valsartan (Exforge) 5-160 mg tablet    metoprolol tartrate (Lopressor) 25 mg tablet      2. Mixed hyperlipidemia  alirocumab (Praluent Pen) 75 mg/mL pen injector      3. PVD (peripheral vascular disease)  alirocumab (Praluent Pen) 75 mg/mL pen injector      4. Type 2 diabetes mellitus with other circulatory complication, with long-term current use of insulin  alirocumab (Praluent Pen) 75 mg/mL pen injector      5. Statin intolerance  alirocumab (Praluent Pen) 75 mg/mL pen injector      6. BMI 21.0-21.9, adult        7. Former smoker                 Scribe Attestation  By signing my name below, I, Celestina Roberts LPN   attest that this documentation has been prepared under the direction and in the presence of Annette Zabala MD.     Provider Attestation - Scribe documentation    All medical record entries made by the Scribe were at my direction and personally dictated by me. I have reviewed the chart and agree that the record accurately reflects my personal performance of the history, physical exam, discussion and plan.

## 2025-07-09 ENCOUNTER — TELEPHONE (OUTPATIENT)
Age: 60
End: 2025-07-09

## 2025-07-09 NOTE — TELEPHONE ENCOUNTER
Patient called wanting an order for a test for ESBL. Patient states it is an infection that is transmitted from urine. 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 Patient also requests a tests to rule out a kidney/bladder/UTI. Patient has appointment on 7/16/25 for the UTI issue. Patient requests a call when orders are in place.

## 2025-07-10 DIAGNOSIS — R30.0 DYSURIA: Primary | ICD-10-CM

## 2025-07-10 NOTE — TELEPHONE ENCOUNTER
Patient called again about being tested for ESBL and a test to check her kidneys. Now she is having trouble urinating and it hurts at times.    She would like to come in today if possible. Please call patient once the orders are placed.

## 2025-07-31 ENCOUNTER — TELEPHONE (OUTPATIENT)
Age: 60
End: 2025-07-31

## 2025-08-18 RX ORDER — IBUPROFEN 800 MG/1
800 TABLET, FILM COATED ORAL EVERY 8 HOURS PRN
Qty: 120 TABLET | Refills: 1 | Status: SHIPPED | OUTPATIENT
Start: 2025-08-18

## 2026-01-30 ENCOUNTER — APPOINTMENT (OUTPATIENT)
Dept: CARDIOLOGY | Facility: CLINIC | Age: 61
End: 2026-01-30
Payer: MEDICARE

## (undated) DEVICE — SUTURE PROL SZ 7-0 L30IN NONABSORBABLE BLU L9.3MM BV-1 1/2 8703H

## (undated) DEVICE — SMALL (GREEN) FOR GRAFTS UP TO 8 MM, 20 1/2" / 52 CM (1/PKG): Brand: SCANLAN® VASCULAR TUNNELER SHEATHS AND BULLET TIPS

## (undated) DEVICE — SVMMC VASC MAJ PK

## (undated) DEVICE — SUTURE PROL SZ 5-0 L36IN NONABSORBABLE BLU L13MM C-1 3/8 8720H

## (undated) DEVICE — TUBE SET 96 MM 64 MM H2O PERISTALTIC STD AUX CHANNEL

## (undated) DEVICE — CONMED SCOPE SAVER BITE BLOCK, 20X27 MM: Brand: SCOPE SAVER

## (undated) DEVICE — TUBING, SUCTION, 1/4" X 10', STRAIGHT: Brand: MEDLINE

## (undated) DEVICE — BRUSH ENDO CLN L90.5IN SHTH DIA1.7MM BRIST DIA5-7MM 2-6MM

## (undated) DEVICE — ENDO CARRY-ON PROCEDURE KIT: Brand: ENDO CARRY-ON PROCEDURE KIT

## (undated) DEVICE — GOWN,SIRUS,NONRNF,SETINSLV,XL,20/CS: Brand: MEDLINE

## (undated) DEVICE — SUTURE PROL SZ 6-0 L18IN NONABSORBABLE BLU L9.3MM BV-1 3/8 8806H

## (undated) DEVICE — SINGLE PORT MANIFOLD: Brand: NEPTUNE 2

## (undated) DEVICE — TUBING IRRIGATION 140/160/180/190 SER GI ENDOSCP SMARTCAP

## (undated) DEVICE — GLOVE ORANGE PI 8   MSG9080